# Patient Record
Sex: MALE | Race: WHITE | NOT HISPANIC OR LATINO | Employment: OTHER | URBAN - NONMETROPOLITAN AREA
[De-identification: names, ages, dates, MRNs, and addresses within clinical notes are randomized per-mention and may not be internally consistent; named-entity substitution may affect disease eponyms.]

---

## 2017-04-10 ENCOUNTER — TRANSCRIBE ORDERS (OUTPATIENT)
Dept: ADMINISTRATIVE | Facility: HOSPITAL | Age: 72
End: 2017-04-10

## 2017-04-10 DIAGNOSIS — I25.10 UNSPECIFIED CARDIOVASCULAR DISEASE: Primary | ICD-10-CM

## 2017-04-21 ENCOUNTER — HOSPITAL ENCOUNTER (OUTPATIENT)
Dept: CARDIOLOGY | Facility: HOSPITAL | Age: 72
Discharge: HOME | End: 2017-04-21
Attending: INTERNAL MEDICINE

## 2017-04-21 ENCOUNTER — APPOINTMENT (OUTPATIENT)
Dept: CARDIOLOGY | Facility: HOSPITAL | Age: 72
End: 2017-04-21
Attending: INTERNAL MEDICINE

## 2017-04-26 ENCOUNTER — TELEPHONE (OUTPATIENT)
Dept: CARDIOLOGY | Age: 72
End: 2017-04-26

## 2017-04-26 ENCOUNTER — OFFICE VISIT (OUTPATIENT)
Dept: CARDIOLOGY | Age: 72
End: 2017-04-26
Payer: MEDICARE

## 2017-04-26 VITALS
HEART RATE: 72 BPM | HEIGHT: 70 IN | BODY MASS INDEX: 37.94 KG/M2 | WEIGHT: 265 LBS | SYSTOLIC BLOOD PRESSURE: 116 MMHG | DIASTOLIC BLOOD PRESSURE: 78 MMHG

## 2017-04-26 DIAGNOSIS — I25.10 CORONARY ARTERY DISEASE INVOLVING NATIVE CORONARY ARTERY OF NATIVE HEART WITHOUT ANGINA PECTORIS: Primary | ICD-10-CM

## 2017-04-26 DIAGNOSIS — R06.09 DOE (DYSPNEA ON EXERTION): ICD-10-CM

## 2017-04-26 DIAGNOSIS — I10 ESSENTIAL HYPERTENSION: ICD-10-CM

## 2017-04-26 PROCEDURE — 1123F ACP DISCUSS/DSCN MKR DOCD: CPT | Performed by: CLINICAL NURSE SPECIALIST

## 2017-04-26 PROCEDURE — 3017F COLORECTAL CA SCREEN DOC REV: CPT | Performed by: CLINICAL NURSE SPECIALIST

## 2017-04-26 PROCEDURE — 1036F TOBACCO NON-USER: CPT | Performed by: CLINICAL NURSE SPECIALIST

## 2017-04-26 PROCEDURE — G8417 CALC BMI ABV UP PARAM F/U: HCPCS | Performed by: CLINICAL NURSE SPECIALIST

## 2017-04-26 PROCEDURE — G8427 DOCREV CUR MEDS BY ELIG CLIN: HCPCS | Performed by: CLINICAL NURSE SPECIALIST

## 2017-04-26 PROCEDURE — G8598 ASA/ANTIPLAT THER USED: HCPCS | Performed by: CLINICAL NURSE SPECIALIST

## 2017-04-26 PROCEDURE — 99214 OFFICE O/P EST MOD 30 MIN: CPT | Performed by: CLINICAL NURSE SPECIALIST

## 2017-04-26 PROCEDURE — 4040F PNEUMOC VAC/ADMIN/RCVD: CPT | Performed by: CLINICAL NURSE SPECIALIST

## 2017-04-26 RX ORDER — RANOLAZINE 500 MG/1
1000 TABLET, EXTENDED RELEASE ORAL 2 TIMES DAILY
COMMUNITY
End: 2022-04-28

## 2017-05-04 ENCOUNTER — HOSPITAL ENCOUNTER (OUTPATIENT)
Dept: NUCLEAR MEDICINE | Age: 72
Discharge: HOME | End: 2017-05-04
Payer: MEDICARE

## 2017-05-04 ENCOUNTER — HOSPITAL ENCOUNTER (OUTPATIENT)
Dept: NON INVASIVE DIAGNOSTICS | Age: 72
Discharge: HOME | End: 2017-05-04
Payer: MEDICARE

## 2017-05-04 ENCOUNTER — HOSPITAL ENCOUNTER (OUTPATIENT)
Dept: NUCLEAR MEDICINE | Age: 72
End: 2017-05-04
Payer: MEDICARE

## 2017-05-04 DIAGNOSIS — I10 ESSENTIAL HYPERTENSION: ICD-10-CM

## 2017-05-04 DIAGNOSIS — I25.10 CORONARY ARTERY DISEASE INVOLVING NATIVE CORONARY ARTERY OF NATIVE HEART WITHOUT ANGINA PECTORIS: ICD-10-CM

## 2017-05-04 DIAGNOSIS — R06.09 DOE (DYSPNEA ON EXERTION): ICD-10-CM

## 2017-05-04 PROCEDURE — 78452 HT MUSCLE IMAGE SPECT MULT: CPT

## 2017-05-04 PROCEDURE — 6360000002 HC RX W HCPCS: Performed by: INTERNAL MEDICINE

## 2017-05-04 PROCEDURE — A9500 TC99M SESTAMIBI: HCPCS | Performed by: INTERNAL MEDICINE

## 2017-05-04 PROCEDURE — 93017 CV STRESS TEST TRACING ONLY: CPT

## 2017-05-04 PROCEDURE — 3430000000 HC RX DIAGNOSTIC RADIOPHARMACEUTICAL: Performed by: INTERNAL MEDICINE

## 2017-05-04 RX ADMIN — TETRAKIS(2-METHOXYISOBUTYLISOCYANIDE)COPPER(I) TETRAFLUOROBORATE 30 MILLICURIE: 1 INJECTION, POWDER, LYOPHILIZED, FOR SOLUTION INTRAVENOUS at 10:48

## 2017-05-04 RX ADMIN — TETRAKIS(2-METHOXYISOBUTYLISOCYANIDE)COPPER(I) TETRAFLUOROBORATE 10 MILLICURIE: 1 INJECTION, POWDER, LYOPHILIZED, FOR SOLUTION INTRAVENOUS at 10:48

## 2017-05-04 RX ADMIN — REGADENOSON 0.4 MG: 0.08 INJECTION, SOLUTION INTRAVENOUS at 10:48

## 2017-09-05 ENCOUNTER — HOSPITAL ENCOUNTER (OUTPATIENT)
Dept: LAB | Age: 72
Discharge: HOME | End: 2017-09-05
Payer: MEDICARE

## 2017-09-05 ENCOUNTER — HOSPITAL ENCOUNTER (OUTPATIENT)
Dept: NON INVASIVE DIAGNOSTICS | Age: 72
Discharge: HOME | End: 2017-09-05
Payer: MEDICARE

## 2017-09-05 ENCOUNTER — HOSPITAL ENCOUNTER (OUTPATIENT)
Dept: PREADMISSION TESTING | Age: 72
Setting detail: OUTPATIENT SURGERY
Discharge: HOME | End: 2017-09-05

## 2017-09-05 LAB
ANION GAP SERPL CALCULATED.3IONS-SCNC: 19 MMOL/L (ref 7–19)
BUN BLDV-MCNC: 23 MG/DL (ref 8–23)
CALCIUM SERPL-MCNC: 9.2 MG/DL (ref 8.8–10.2)
CHLORIDE BLD-SCNC: 103 MMOL/L (ref 98–111)
CO2: 22 MMOL/L (ref 22–29)
CREAT SERPL-MCNC: 1.5 MG/DL (ref 0.5–1.2)
GFR NON-AFRICAN AMERICAN: 46
GLUCOSE BLD-MCNC: 97 MG/DL (ref 74–109)
POTASSIUM SERPL-SCNC: 4.7 MMOL/L (ref 3.5–5)
SODIUM BLD-SCNC: 144 MMOL/L (ref 136–145)

## 2017-09-05 PROCEDURE — 93005 ELECTROCARDIOGRAM TRACING: CPT

## 2017-09-06 ENCOUNTER — ANESTHESIA EVENT (OUTPATIENT)
Dept: OPERATING ROOM | Age: 72
End: 2017-09-06

## 2017-09-14 ENCOUNTER — ANESTHESIA (OUTPATIENT)
Dept: OPERATING ROOM | Age: 72
End: 2017-09-14
Payer: MEDICARE

## 2017-09-14 ENCOUNTER — HOSPITAL ENCOUNTER (OUTPATIENT)
Age: 72
Setting detail: OUTPATIENT SURGERY
Discharge: HOME | End: 2017-09-14
Attending: ORTHOPAEDIC SURGERY | Admitting: ORTHOPAEDIC SURGERY

## 2017-09-14 ENCOUNTER — HOSPITAL ENCOUNTER (OUTPATIENT)
Dept: GENERAL RADIOLOGY | Age: 72
Discharge: HOME | End: 2017-09-14
Payer: MEDICARE

## 2017-09-14 VITALS
DIASTOLIC BLOOD PRESSURE: 64 MMHG | OXYGEN SATURATION: 92 % | RESPIRATION RATE: 18 BRPM | TEMPERATURE: 97 F | BODY MASS INDEX: 35.65 KG/M2 | HEART RATE: 76 BPM | SYSTOLIC BLOOD PRESSURE: 99 MMHG | WEIGHT: 249 LBS | HEIGHT: 70 IN

## 2017-09-14 VITALS
DIASTOLIC BLOOD PRESSURE: 54 MMHG | OXYGEN SATURATION: 95 % | RESPIRATION RATE: 14 BRPM | SYSTOLIC BLOOD PRESSURE: 78 MMHG

## 2017-09-14 DIAGNOSIS — M79.671 PAIN IN RIGHT FOOT: ICD-10-CM

## 2017-09-14 PROBLEM — S92.353B: Status: ACTIVE | Noted: 2017-09-14

## 2017-09-14 PROCEDURE — C1713 ANCHOR/SCREW BN/BN,TIS/BN: HCPCS | Performed by: ORTHOPAEDIC SURGERY

## 2017-09-14 PROCEDURE — G8916 PT W IV AB GIVEN ON TIME: HCPCS

## 2017-09-14 PROCEDURE — G8907 PT DOC NO EVENTS ON DISCHARG: HCPCS

## 2017-09-14 PROCEDURE — 20680 REMOVAL OF IMPLANT DEEP: CPT

## 2017-09-14 PROCEDURE — 01480 ANES OPEN PX LOWER L/A/F NOS: CPT | Performed by: NURSE ANESTHETIST, CERTIFIED REGISTERED

## 2017-09-14 PROCEDURE — 3209999900 FLUORO FOR SURGICAL PROCEDURES

## 2017-09-14 PROCEDURE — 28322 REPAIR OF METATARSALS: CPT

## 2017-09-14 DEVICE — SCREW BNE L10MM DIA2.4MM CORT S STL ST T8 STARDRV RECESS: Type: IMPLANTABLE DEVICE | Site: FOOT | Status: FUNCTIONAL

## 2017-09-14 DEVICE — SCREW BNE L18MM DIA2.4MM DST RAD VOLAR S STL ST VAR ANG LOK: Type: IMPLANTABLE DEVICE | Site: FOOT | Status: FUNCTIONAL

## 2017-09-14 DEVICE — SCREW BNE L12MM DIA2.4MM DST RAD VOLAR S STL ST VAR ANG LOK: Type: IMPLANTABLE DEVICE | Site: FOOT | Status: FUNCTIONAL

## 2017-09-14 DEVICE — SCREW BNE L14MM DIA2.4MM DST RAD VOLAR S STL ST VAR ANG LOK: Type: IMPLANTABLE DEVICE | Site: FOOT | Status: FUNCTIONAL

## 2017-09-14 DEVICE — IMPLANTABLE DEVICE: Type: IMPLANTABLE DEVICE | Site: FOOT | Status: FUNCTIONAL

## 2017-09-14 RX ORDER — LABETALOL HYDROCHLORIDE 5 MG/ML
5 INJECTION, SOLUTION INTRAVENOUS EVERY 10 MIN PRN
Status: DISCONTINUED | OUTPATIENT
Start: 2017-09-14 | End: 2017-09-14 | Stop reason: HOSPADM

## 2017-09-14 RX ORDER — ONDANSETRON 2 MG/ML
4 INJECTION INTRAMUSCULAR; INTRAVENOUS
Status: DISCONTINUED | OUTPATIENT
Start: 2017-09-14 | End: 2017-09-14 | Stop reason: HOSPADM

## 2017-09-14 RX ORDER — EPHEDRINE SULFATE 50 MG/ML
INJECTION, SOLUTION INTRAVENOUS PRN
Status: DISCONTINUED | OUTPATIENT
Start: 2017-09-14 | End: 2017-09-14 | Stop reason: SDUPTHER

## 2017-09-14 RX ORDER — HYDRALAZINE HYDROCHLORIDE 20 MG/ML
5 INJECTION INTRAMUSCULAR; INTRAVENOUS EVERY 10 MIN PRN
Status: DISCONTINUED | OUTPATIENT
Start: 2017-09-14 | End: 2017-09-14 | Stop reason: HOSPADM

## 2017-09-14 RX ORDER — HYDROMORPHONE HCL 110MG/55ML
0.5 PATIENT CONTROLLED ANALGESIA SYRINGE INTRAVENOUS EVERY 5 MIN PRN
Status: DISCONTINUED | OUTPATIENT
Start: 2017-09-14 | End: 2017-09-14 | Stop reason: HOSPADM

## 2017-09-14 RX ORDER — HYDROMORPHONE HCL 110MG/55ML
0.25 PATIENT CONTROLLED ANALGESIA SYRINGE INTRAVENOUS EVERY 5 MIN PRN
Status: DISCONTINUED | OUTPATIENT
Start: 2017-09-14 | End: 2017-09-14 | Stop reason: HOSPADM

## 2017-09-14 RX ORDER — FENTANYL CITRATE 50 UG/ML
50 INJECTION, SOLUTION INTRAMUSCULAR; INTRAVENOUS EVERY 5 MIN PRN
Status: DISCONTINUED | OUTPATIENT
Start: 2017-09-14 | End: 2017-09-14 | Stop reason: HOSPADM

## 2017-09-14 RX ORDER — CEFAZOLIN SODIUM 1 G/3ML
INJECTION, POWDER, FOR SOLUTION INTRAMUSCULAR; INTRAVENOUS PRN
Status: DISCONTINUED | OUTPATIENT
Start: 2017-09-14 | End: 2017-09-14 | Stop reason: SDUPTHER

## 2017-09-14 RX ORDER — OXYCODONE HYDROCHLORIDE AND ACETAMINOPHEN 5; 325 MG/1; MG/1
2 TABLET ORAL PRN
Status: DISCONTINUED | OUTPATIENT
Start: 2017-09-14 | End: 2017-09-14 | Stop reason: HOSPADM

## 2017-09-14 RX ORDER — HYDROCODONE BITARTRATE AND ACETAMINOPHEN 5; 325 MG/1; MG/1
1 TABLET ORAL PRN
Status: DISCONTINUED | OUTPATIENT
Start: 2017-09-14 | End: 2017-09-14 | Stop reason: HOSPADM

## 2017-09-14 RX ORDER — MEPERIDINE HYDROCHLORIDE 25 MG/ML
12.5 INJECTION INTRAMUSCULAR; INTRAVENOUS; SUBCUTANEOUS EVERY 5 MIN PRN
Status: DISCONTINUED | OUTPATIENT
Start: 2017-09-14 | End: 2017-09-14 | Stop reason: HOSPADM

## 2017-09-14 RX ORDER — DIPHENHYDRAMINE HYDROCHLORIDE 50 MG/ML
12.5 INJECTION INTRAMUSCULAR; INTRAVENOUS
Status: DISCONTINUED | OUTPATIENT
Start: 2017-09-14 | End: 2017-09-14 | Stop reason: HOSPADM

## 2017-09-14 RX ORDER — PROMETHAZINE HYDROCHLORIDE 25 MG/ML
12.5 INJECTION, SOLUTION INTRAMUSCULAR; INTRAVENOUS
Status: DISCONTINUED | OUTPATIENT
Start: 2017-09-14 | End: 2017-09-14 | Stop reason: HOSPADM

## 2017-09-14 RX ORDER — MORPHINE SULFATE 15 MG/1
15 TABLET, FILM COATED, EXTENDED RELEASE ORAL 2 TIMES DAILY
Qty: 60 TABLET | Refills: 0 | Status: SHIPPED | OUTPATIENT
Start: 2017-09-14 | End: 2017-10-14

## 2017-09-14 RX ORDER — HYDROCODONE BITARTRATE AND ACETAMINOPHEN 5; 325 MG/1; MG/1
2 TABLET ORAL PRN
Status: DISCONTINUED | OUTPATIENT
Start: 2017-09-14 | End: 2017-09-14 | Stop reason: HOSPADM

## 2017-09-14 RX ORDER — ONDANSETRON 4 MG/1
8 TABLET, ORALLY DISINTEGRATING ORAL ONCE
Status: DISCONTINUED | OUTPATIENT
Start: 2017-09-14 | End: 2017-09-14 | Stop reason: HOSPADM

## 2017-09-14 RX ORDER — OXYCODONE HYDROCHLORIDE AND ACETAMINOPHEN 5; 325 MG/1; MG/1
1 TABLET ORAL PRN
Status: DISCONTINUED | OUTPATIENT
Start: 2017-09-14 | End: 2017-09-14 | Stop reason: HOSPADM

## 2017-09-14 RX ORDER — HYDROCODONE BITARTRATE AND ACETAMINOPHEN 10; 325 MG/1; MG/1
1 TABLET ORAL EVERY 4 HOURS PRN
Qty: 90 TABLET | Refills: 0 | Status: SHIPPED | OUTPATIENT
Start: 2017-09-14 | End: 2017-09-21

## 2017-09-14 RX ORDER — SODIUM CHLORIDE, SODIUM LACTATE, POTASSIUM CHLORIDE, CALCIUM CHLORIDE 600; 310; 30; 20 MG/100ML; MG/100ML; MG/100ML; MG/100ML
INJECTION, SOLUTION INTRAVENOUS CONTINUOUS
Status: DISCONTINUED | OUTPATIENT
Start: 2017-09-14 | End: 2017-09-14 | Stop reason: HOSPADM

## 2017-09-14 RX ORDER — ONDANSETRON 2 MG/ML
INJECTION INTRAMUSCULAR; INTRAVENOUS PRN
Status: DISCONTINUED | OUTPATIENT
Start: 2017-09-14 | End: 2017-09-14 | Stop reason: SDUPTHER

## 2017-09-14 RX ORDER — FENTANYL CITRATE 50 UG/ML
INJECTION, SOLUTION INTRAMUSCULAR; INTRAVENOUS PRN
Status: DISCONTINUED | OUTPATIENT
Start: 2017-09-14 | End: 2017-09-14 | Stop reason: SDUPTHER

## 2017-09-14 RX ORDER — PROPOFOL 10 MG/ML
INJECTION, EMULSION INTRAVENOUS PRN
Status: DISCONTINUED | OUTPATIENT
Start: 2017-09-14 | End: 2017-09-14 | Stop reason: SDUPTHER

## 2017-09-14 RX ORDER — MIDAZOLAM HYDROCHLORIDE 1 MG/ML
INJECTION INTRAMUSCULAR; INTRAVENOUS PRN
Status: DISCONTINUED | OUTPATIENT
Start: 2017-09-14 | End: 2017-09-14 | Stop reason: SDUPTHER

## 2017-09-14 RX ORDER — BUPIVACAINE HYDROCHLORIDE 2.5 MG/ML
INJECTION, SOLUTION EPIDURAL; INFILTRATION; INTRACAUDAL PRN
Status: DISCONTINUED | OUTPATIENT
Start: 2017-09-14 | End: 2017-09-14 | Stop reason: HOSPADM

## 2017-09-14 RX ADMIN — SODIUM CHLORIDE, SODIUM LACTATE, POTASSIUM CHLORIDE, CALCIUM CHLORIDE: 600; 310; 30; 20 INJECTION, SOLUTION INTRAVENOUS at 09:48

## 2017-09-14 RX ADMIN — EPHEDRINE SULFATE 10 MG: 50 INJECTION, SOLUTION INTRAVENOUS at 12:47

## 2017-09-14 RX ADMIN — CEFAZOLIN SODIUM 1000 MG: 1 INJECTION, POWDER, FOR SOLUTION INTRAMUSCULAR; INTRAVENOUS at 11:54

## 2017-09-14 RX ADMIN — MIDAZOLAM HYDROCHLORIDE 2 MG: 1 INJECTION INTRAMUSCULAR; INTRAVENOUS at 11:56

## 2017-09-14 RX ADMIN — ONDANSETRON 2 MG: 2 INJECTION INTRAMUSCULAR; INTRAVENOUS at 12:21

## 2017-09-14 RX ADMIN — PROPOFOL 80 MG: 10 INJECTION, EMULSION INTRAVENOUS at 11:58

## 2017-09-14 RX ADMIN — FENTANYL CITRATE 50 MCG: 50 INJECTION, SOLUTION INTRAMUSCULAR; INTRAVENOUS at 11:57

## 2017-09-14 RX ADMIN — PROPOFOL 100 MG: 10 INJECTION, EMULSION INTRAVENOUS at 11:59

## 2017-10-17 ENCOUNTER — HOSPITAL ENCOUNTER (OUTPATIENT)
Dept: WOUND CARE | Age: 72
Discharge: HOME | End: 2017-10-17
Payer: MEDICARE

## 2017-10-17 VITALS
HEIGHT: 70 IN | TEMPERATURE: 97.9 F | HEART RATE: 73 BPM | WEIGHT: 255 LBS | DIASTOLIC BLOOD PRESSURE: 84 MMHG | BODY MASS INDEX: 36.51 KG/M2 | SYSTOLIC BLOOD PRESSURE: 145 MMHG | RESPIRATION RATE: 16 BRPM

## 2017-10-17 DIAGNOSIS — T81.31XA DEHISCENCE OF OPERATIVE WOUND, INITIAL ENCOUNTER: Chronic | ICD-10-CM

## 2017-10-17 PROBLEM — T81.49XA WOUND INFECTION AFTER SURGERY: Chronic | Status: ACTIVE | Noted: 2017-10-17

## 2017-10-17 PROBLEM — E66.9 CLASS 2 OBESITY IN ADULT: Chronic | Status: ACTIVE | Noted: 2017-10-17

## 2017-10-17 PROBLEM — E66.812 CLASS 2 OBESITY IN ADULT: Chronic | Status: ACTIVE | Noted: 2017-10-17

## 2017-10-17 PROCEDURE — 87205 SMEAR GRAM STAIN: CPT

## 2017-10-17 PROCEDURE — 11042 DBRDMT SUBQ TIS 1ST 20SQCM/<: CPT | Performed by: SURGERY

## 2017-10-17 PROCEDURE — 99213 OFFICE O/P EST LOW 20 MIN: CPT

## 2017-10-17 PROCEDURE — 11042 DBRDMT SUBQ TIS 1ST 20SQCM/<: CPT

## 2017-10-17 PROCEDURE — 99203 OFFICE O/P NEW LOW 30 MIN: CPT | Performed by: SURGERY

## 2017-10-17 PROCEDURE — 87185 SC STD ENZYME DETCJ PER NZM: CPT

## 2017-10-17 PROCEDURE — 86403 PARTICLE AGGLUT ANTBDY SCRN: CPT

## 2017-10-17 PROCEDURE — 87070 CULTURE OTHR SPECIMN AEROBIC: CPT

## 2017-10-17 RX ORDER — CEPHALEXIN 500 MG/1
500 CAPSULE ORAL 4 TIMES DAILY
COMMUNITY
End: 2017-10-31 | Stop reason: ALTCHOICE

## 2017-10-17 ASSESSMENT — PAIN DESCRIPTION - ORIENTATION: ORIENTATION: RIGHT

## 2017-10-17 ASSESSMENT — PAIN DESCRIPTION - FREQUENCY: FREQUENCY: INTERMITTENT

## 2017-10-17 ASSESSMENT — PAIN SCALES - GENERAL: PAINLEVEL_OUTOF10: 3

## 2017-10-17 ASSESSMENT — PAIN DESCRIPTION - DESCRIPTORS: DESCRIPTORS: SHARP;SHOOTING

## 2017-10-17 ASSESSMENT — PAIN DESCRIPTION - PAIN TYPE: TYPE: ACUTE PAIN

## 2017-10-17 ASSESSMENT — PAIN DESCRIPTION - LOCATION: LOCATION: FOOT

## 2017-10-17 NOTE — PROGRESS NOTES
ointment Apply topically daily. 1 Tube 1    ranolazine (RANEXA) 500 MG extended release tablet Take 1,000 mg by mouth 2 times daily      amLODIPine (NORVASC) 5 MG tablet Take 2.5 mg by mouth daily       omeprazole (PRILOSEC) 20 MG capsule Take 20 mg by mouth daily.  metoprolol (LOPRESSOR) 50 MG tablet Take 25 mg by mouth 2 times daily One half tablet twice daily.  lisinopril (PRINIVIL;ZESTRIL) 10 MG tablet Take 10 mg by mouth daily.  dipyridamole (PERSANTINE) 75 MG tablet Take 75 mg by mouth 3 times daily.  Omega-3 Fatty Acids (FISH OIL) 1000 MG CAPS Take 1,000 mg by mouth 2 times daily.  aspirin 325 MG tablet Take 325 mg by mouth daily.  Coenzyme Q10 (CO Q 10) 10 MG CAPS Take  by mouth.  atorvastatin (LIPITOR) 40 MG tablet Take 20 mg by mouth daily        Current Facility-Administered Medications   Medication Dose Route Frequency Provider Last Rate Last Dose    lidocaine (XYLOCAINE) 2 % jelly   Topical Once Nadir Orantes MD         Allergies: Crestor [rosuvastatin]  Past Medical History:   Diagnosis Date    CAD (coronary artery disease) 2003    CABG    Cervical spinal stenosis     Claustrophobia     Depression     Diverticulitis     HTN (hypertension)     Hyperlipidemia     Dr. Ben Hernandez manages    MI (myocardial infarction)     MI, old 2003    Sleep apnea     wear CPAP       Past Surgical History:   Procedure Laterality Date    CARDIAC CATHETERIZATION  01/2011    Chronic occusions med rx for now   [address]   2003    In  [address]     [address] / ANKLE Right 9/14/2017    HARDWARE REMOVAL AND REVISION ORIF 5TH METATARSAL performed by Luisa Staley DO at [address] [facility] [address]       History reviewed. No pertinent family history.   Social History   Substance Use Topics    Smoking status: Former Smoker     Quit date: 12/1/2002    Smokeless initial encounter    2. Class 2 obesity due to excess calories with serious comorbidity and body mass index (BMI) of 36.0 to 36.9 in adult    3. Postoperative wound infection, initial encounter          Plan    I discussed withthe patient that with the appearance of his wound, I am concerned about infection of the wound and likely the hardware. If the hardware is infected, I do not feel this wound will heal. The patient is taking oral antibiotics now, but likely needs IV abx. I took a culture during the clinic today. We will follow this up and see what if any other antibiotics he needs. I would recommend that he go along with any surgery suggested by Dr. Nadeen Barbosa, especially if he is recommending hardware removal. The patient expressed understanding of this. An isi was performed in the office, which showed good results. I do not feel he needs an TAYLOR at this time. Recommend no smoking  Offloading instructions given                     [facility]   Progress Note and Procedure Note      Danvilledonell Tierney  AGE: 74 y.o. GENDER: male  : 1945  TODAY'S DATE:  10/17/2017    Subjective:        HISTORY of PRESENT ILLNESS HPI   Owasso Right is a 74 y.o. male who presents today for wound evaluation. Wound Type:non-healing surgical  Wound Location:right foot  Modifying factors:chronic pressure, shear force and obesity    Patient Active Problem List   Diagnosis Code    CAD (coronary artery disease) I25.10    Sleep apnea G47.30    Hyperlipidemia E78.5    HTN (hypertension) I10    Open displaced fracture of fifth metatarsal bone S92.353B    Surgical wound dehiscence T81.31XA    Class 2 obesity in adult E66.9    Wound infection after surgery T81. 4XXA       ALLERGIES    Allergies   Allergen Reactions    Crestor [Rosuvastatin]      Muscle spasms           Objective:      BP (!) 145/84   Pulse 73   Temp 97.9 °F (36.6 °C) (Oral)   Resp 16   Ht 5' 10\" (1.778 m)   Wt 255 lb (115.7 kg)   BMI 36.59 kg/m² Post Debridement Measurements and Assessment:  Wound 10/17/17 Other (Comment) Foot Right; Outer #1 right lateral foot (Active)   Wound Type Wound 10/17/2017 11:01 AM   Wound Other 10/17/2017 11:01 AM   Dressing Status Old drainage 10/17/2017 11:01 AM   Dressing/Treatment Wound gel;ABD 10/17/2017 12:19 PM   Wound Cleansed Rinsed/Irrigated with saline 10/17/2017 11:01 AM   Wound Length (cm) 10 cm 10/17/2017 11:41 AM   Wound Width (cm) 1.5 cm 10/17/2017 11:41 AM   Wound Depth (cm)  0.2 10/17/2017 11:41 AM   Calculated Wound Size (cm^2) (l*w) 15 cm^2 10/17/2017 11:41 AM   Change in Wound Size % (l*w) 0 10/17/2017 11:41 AM   Distance Tunneling (cm) 0 cm 10/17/2017 11:01 AM   Tunneling Position ___ O'Clock 0 10/17/2017 11:01 AM   Undermining Starts ___ O'Clock 0 10/17/2017 11:01 AM   Undermining Ends___ O'Clock 0 10/17/2017 11:01 AM   Undermining Maxium Distance (cm) 0 10/17/2017 11:01 AM   Wound Assessment Granulation tissue;Pink;Slough; Yellow 10/17/2017 11:01 AM   Margins Defined edges 10/17/2017 11:01 AM   Beatriz-wound Assessment Pink;Red 10/17/2017 11:01 AM   Non-staged Wound Description Full thickness 10/17/2017 11:01 AM   [place]%Wound Bed 10 10/17/2017 11:01 AM   Yellow%Wound Bed 90 10/17/2017 11:01 AM   Drainage Amount Moderate 10/17/2017 11:01 AM   Drainage Description Serosanguinous 10/17/2017 11:01 AM   Odor None 10/17/2017 11:01 AM   Culture Taken Yes 10/17/2017 11:41 AM   Debridement per physician Partial thickness 10/17/2017 11:41 AM   Time out Yes 10/17/2017 11:41 AM   Procedural Pain 0 10/17/2017 11:41 AM   Post procedural Pain 0 10/17/2017 11:41 AM   Number of days: 0      The patients pain isPain Level: 3 Pain Type: Acute pain. Please refer to nursing measurements and assessment regarding wound pre and post debridement.     Procedure Note:    Wound #: 1     Debridement: Non-excisional Debridement    Anesthetic: Anesthetic: 2% Xylocaine Gel  Using curette, scissors and forceps the wound was sharply debrided    down through and including the removal of epidermis, dermis and subcutaneous tissue. Devitalized Tissue Debrided:  fibrin, biofilm, slough, necrotic/eschar and exudate    Percent of Wound Debrided: 100%      Bleeding: Minimal    Hemostasis:   not needed      Response to treatment:  Well tolerated by patient., With complaints of pain. Assessment:      Patient Active Problem List   Diagnosis    CAD (coronary artery disease)    Sleep apnea    Hyperlipidemia    HTN (hypertension)    Open displaced fracture of fifth metatarsal bone    Surgical wound dehiscence    Class 2 obesity in adult    Wound infection after surgery          Plan:          Plan for wound - Dress per physician order  Treatment:     Compression : No   Offloading : Yes-- discussed elevating his foot above the level of his heart. Dressing : see AVS   Additional Therapy : none     1. Discussed appropriate home care of this wound. Wound redressed. 2. Patient instructions were given. 3. Follow up: 2 week(s). Discharge Instructions       Visit Discharge/Physician Orders    Discharge condition: Stable    Discharge to: Home    Left via:Private automobile    Accompanied by:  spouse    ECF/HHA: none    Dressing Orders: Right Foot:   Wash with soap and water. Apply santyl (nickel-thickness) to wound bed. Cover with normal saline moistened gauze. Cover with dry gauze or ABD. Secure with rolled gauze and medipore tape. Change twice daily . Treatment Orders: Protein rich diet (unless restricted by your physician); Multivitamin daily; Elevate legs when sitting, avoid standing for long periods of time. Elevate legs above the level of your heart. Minimal walking. [address] [place] [address],[address] followup visit _____2 weeks________________________  (Please note your next appointment above and if you are unable to keep, kindly give a 24 hour notice.  Thank you.)          If you experience any of the following, please call the Wound Care

## 2017-10-20 LAB
GRAM STAIN RESULT: ABNORMAL
ORGANISM: ABNORMAL
WOUND/ABSCESS: ABNORMAL

## 2017-10-31 ENCOUNTER — HOSPITAL ENCOUNTER (OUTPATIENT)
Dept: WOUND CARE | Age: 72
Discharge: HOME | End: 2017-10-31
Payer: MEDICARE

## 2017-10-31 ENCOUNTER — PREP FOR PROCEDURE (OUTPATIENT)
Dept: ORTHOPEDIC SURGERY | Age: 72
End: 2017-10-31

## 2017-10-31 ENCOUNTER — HOSPITAL ENCOUNTER (OUTPATIENT)
Dept: PREADMISSION TESTING | Age: 72
Discharge: HOME | End: 2017-10-31
Payer: MEDICARE

## 2017-10-31 VITALS
DIASTOLIC BLOOD PRESSURE: 84 MMHG | RESPIRATION RATE: 18 BRPM | HEART RATE: 79 BPM | TEMPERATURE: 97.9 F | WEIGHT: 256 LBS | BODY MASS INDEX: 36.65 KG/M2 | HEIGHT: 70 IN | SYSTOLIC BLOOD PRESSURE: 128 MMHG

## 2017-10-31 VITALS — HEIGHT: 70 IN | BODY MASS INDEX: 36.65 KG/M2 | WEIGHT: 256 LBS

## 2017-10-31 DIAGNOSIS — Z01.818 PRE-OP EVALUATION: Primary | ICD-10-CM

## 2017-10-31 LAB
BASOPHILS ABSOLUTE: 0 K/UL (ref 0–0.2)
BASOPHILS RELATIVE PERCENT: 0.3 % (ref 0–1)
EKG P AXIS: 26 DEGREES
EKG P-R INTERVAL: 188 MS
EKG Q-T INTERVAL: 386 MS
EKG QRS DURATION: 106 MS
EKG QTC CALCULATION (BAZETT): 403 MS
EKG T AXIS: -124 DEGREES
EOSINOPHILS ABSOLUTE: 0.1 K/UL (ref 0–0.6)
EOSINOPHILS RELATIVE PERCENT: 2.3 % (ref 0–5)
HCT VFR BLD CALC: 47.6 % (ref 42–52)
HEMOGLOBIN: 15.5 G/DL (ref 14–18)
LYMPHOCYTES ABSOLUTE: 1.5 K/UL (ref 1.1–4.5)
LYMPHOCYTES RELATIVE PERCENT: 24.3 % (ref 20–40)
MCH RBC QN AUTO: 29.9 PG (ref 27–31)
MCHC RBC AUTO-ENTMCNC: 32.6 G/DL (ref 33–37)
MCV RBC AUTO: 91.9 FL (ref 80–94)
MONOCYTES ABSOLUTE: 0.4 K/UL (ref 0–0.9)
MONOCYTES RELATIVE PERCENT: 6.7 % (ref 0–10)
NEUTROPHILS ABSOLUTE: 3.9 K/UL (ref 1.5–7.5)
NEUTROPHILS RELATIVE PERCENT: 66.1 % (ref 50–65)
PDW BLD-RTO: 13.9 % (ref 11.5–14.5)
PLATELET # BLD: 131 K/UL (ref 130–400)
PMV BLD AUTO: 9.6 FL (ref 9.4–12.4)
RBC # BLD: 5.18 M/UL (ref 4.7–6.1)
WBC # BLD: 6 K/UL (ref 4.8–10.8)

## 2017-10-31 PROCEDURE — 97597 DBRDMT OPN WND 1ST 20 CM/<: CPT

## 2017-10-31 PROCEDURE — 93005 ELECTROCARDIOGRAM TRACING: CPT

## 2017-10-31 PROCEDURE — 85025 COMPLETE CBC W/AUTO DIFF WBC: CPT

## 2017-10-31 PROCEDURE — 97597 DBRDMT OPN WND 1ST 20 CM/<: CPT | Performed by: SURGERY

## 2017-10-31 RX ORDER — SODIUM CHLORIDE 0.9 % (FLUSH) 0.9 %
10 SYRINGE (ML) INJECTION PRN
Status: CANCELLED | OUTPATIENT
Start: 2017-10-31

## 2017-10-31 RX ORDER — LEVOFLOXACIN 500 MG/1
500 TABLET, FILM COATED ORAL DAILY
Qty: 10 TABLET | Refills: 0 | Status: ON HOLD | OUTPATIENT
Start: 2017-10-31 | End: 2017-11-03

## 2017-10-31 RX ORDER — CLINDAMYCIN HYDROCHLORIDE 150 MG/1
150 CAPSULE ORAL 4 TIMES DAILY
Qty: 40 CAPSULE | Refills: 0 | Status: ON HOLD | OUTPATIENT
Start: 2017-10-31 | End: 2017-11-03

## 2017-10-31 RX ORDER — SODIUM CHLORIDE, SODIUM LACTATE, POTASSIUM CHLORIDE, CALCIUM CHLORIDE 600; 310; 30; 20 MG/100ML; MG/100ML; MG/100ML; MG/100ML
INJECTION, SOLUTION INTRAVENOUS CONTINUOUS
Status: CANCELLED | OUTPATIENT
Start: 2017-10-31

## 2017-10-31 RX ORDER — SODIUM CHLORIDE 0.9 % (FLUSH) 0.9 %
10 SYRINGE (ML) INJECTION EVERY 12 HOURS SCHEDULED
Status: CANCELLED | OUTPATIENT
Start: 2017-10-31

## 2017-10-31 ASSESSMENT — PAIN DESCRIPTION - ORIENTATION: ORIENTATION: RIGHT

## 2017-10-31 ASSESSMENT — PAIN DESCRIPTION - PAIN TYPE: TYPE: ACUTE PAIN

## 2017-10-31 ASSESSMENT — PAIN DESCRIPTION - FREQUENCY: FREQUENCY: INTERMITTENT

## 2017-10-31 ASSESSMENT — PAIN SCALES - GENERAL: PAINLEVEL_OUTOF10: 4

## 2017-10-31 ASSESSMENT — PAIN DESCRIPTION - DESCRIPTORS: DESCRIPTORS: SHARP

## 2017-10-31 ASSESSMENT — PAIN DESCRIPTION - LOCATION: LOCATION: FOOT

## 2017-11-03 ENCOUNTER — HOSPITAL ENCOUNTER (OUTPATIENT)
Age: 72
Setting detail: OUTPATIENT SURGERY
Discharge: HOME | End: 2017-11-03
Attending: ORTHOPAEDIC SURGERY | Admitting: ORTHOPAEDIC SURGERY
Payer: MEDICARE

## 2017-11-03 ENCOUNTER — ANESTHESIA EVENT (OUTPATIENT)
Dept: OPERATING ROOM | Age: 72
End: 2017-11-03
Payer: MEDICARE

## 2017-11-03 ENCOUNTER — ANESTHESIA (OUTPATIENT)
Dept: OPERATING ROOM | Age: 72
End: 2017-11-03
Payer: MEDICARE

## 2017-11-03 VITALS
DIASTOLIC BLOOD PRESSURE: 71 MMHG | OXYGEN SATURATION: 99 % | RESPIRATION RATE: 7 BRPM | SYSTOLIC BLOOD PRESSURE: 131 MMHG

## 2017-11-03 VITALS
HEART RATE: 95 BPM | TEMPERATURE: 97.3 F | DIASTOLIC BLOOD PRESSURE: 92 MMHG | OXYGEN SATURATION: 96 % | RESPIRATION RATE: 16 BRPM | BODY MASS INDEX: 36.65 KG/M2 | SYSTOLIC BLOOD PRESSURE: 167 MMHG | WEIGHT: 256 LBS | HEIGHT: 70 IN

## 2017-11-03 PROCEDURE — 87070 CULTURE OTHR SPECIMN AEROBIC: CPT

## 2017-11-03 PROCEDURE — 6360000002 HC RX W HCPCS: Performed by: NURSE ANESTHETIST, CERTIFIED REGISTERED

## 2017-11-03 PROCEDURE — 2580000003 HC RX 258: Performed by: ORTHOPAEDIC SURGERY

## 2017-11-03 PROCEDURE — 6370000000 HC RX 637 (ALT 250 FOR IP): Performed by: ORTHOPAEDIC SURGERY

## 2017-11-03 PROCEDURE — 7100000011 HC PHASE II RECOVERY - ADDTL 15 MIN: Performed by: ORTHOPAEDIC SURGERY

## 2017-11-03 PROCEDURE — 3700000001 HC ADD 15 MINUTES (ANESTHESIA): Performed by: ORTHOPAEDIC SURGERY

## 2017-11-03 PROCEDURE — 3600000014 HC SURGERY LEVEL 4 ADDTL 15MIN: Performed by: ORTHOPAEDIC SURGERY

## 2017-11-03 PROCEDURE — 6360000002 HC RX W HCPCS: Performed by: ORTHOPAEDIC SURGERY

## 2017-11-03 PROCEDURE — 2720000001 HC MISC SURG SUPPLY STERILE $51-500: Performed by: ORTHOPAEDIC SURGERY

## 2017-11-03 PROCEDURE — 7100000010 HC PHASE II RECOVERY - FIRST 15 MIN: Performed by: ORTHOPAEDIC SURGERY

## 2017-11-03 PROCEDURE — 87186 SC STD MICRODIL/AGAR DIL: CPT

## 2017-11-03 PROCEDURE — 86403 PARTICLE AGGLUT ANTBDY SCRN: CPT

## 2017-11-03 PROCEDURE — 3700000000 HC ANESTHESIA ATTENDED CARE: Performed by: ORTHOPAEDIC SURGERY

## 2017-11-03 PROCEDURE — 87102 FUNGUS ISOLATION CULTURE: CPT

## 2017-11-03 PROCEDURE — 7100000000 HC PACU RECOVERY - FIRST 15 MIN: Performed by: ORTHOPAEDIC SURGERY

## 2017-11-03 PROCEDURE — 87205 SMEAR GRAM STAIN: CPT

## 2017-11-03 PROCEDURE — 2500000003 HC RX 250 WO HCPCS: Performed by: NURSE ANESTHETIST, CERTIFIED REGISTERED

## 2017-11-03 PROCEDURE — A6445 CONFORM BAND S W <3"/YD: HCPCS | Performed by: ORTHOPAEDIC SURGERY

## 2017-11-03 PROCEDURE — 3600000004 HC SURGERY LEVEL 4 BASE: Performed by: ORTHOPAEDIC SURGERY

## 2017-11-03 PROCEDURE — 7100000001 HC PACU RECOVERY - ADDTL 15 MIN: Performed by: ORTHOPAEDIC SURGERY

## 2017-11-03 RX ORDER — DIPHENHYDRAMINE HYDROCHLORIDE 50 MG/ML
12.5 INJECTION INTRAMUSCULAR; INTRAVENOUS
Status: DISCONTINUED | OUTPATIENT
Start: 2017-11-03 | End: 2017-11-03 | Stop reason: HOSPADM

## 2017-11-03 RX ORDER — FENTANYL CITRATE 50 UG/ML
25 INJECTION, SOLUTION INTRAMUSCULAR; INTRAVENOUS
Status: DISCONTINUED | OUTPATIENT
Start: 2017-11-03 | End: 2017-11-03 | Stop reason: HOSPADM

## 2017-11-03 RX ORDER — LIDOCAINE HYDROCHLORIDE 10 MG/ML
INJECTION, SOLUTION EPIDURAL; INFILTRATION; INTRACAUDAL; PERINEURAL PRN
Status: DISCONTINUED | OUTPATIENT
Start: 2017-11-03 | End: 2017-11-03 | Stop reason: SDUPTHER

## 2017-11-03 RX ORDER — DEXAMETHASONE SODIUM PHOSPHATE 10 MG/ML
INJECTION INTRAMUSCULAR; INTRAVENOUS PRN
Status: DISCONTINUED | OUTPATIENT
Start: 2017-11-03 | End: 2017-11-03 | Stop reason: SDUPTHER

## 2017-11-03 RX ORDER — SODIUM CHLORIDE 0.9 % (FLUSH) 0.9 %
10 SYRINGE (ML) INJECTION EVERY 12 HOURS SCHEDULED
Status: DISCONTINUED | OUTPATIENT
Start: 2017-11-03 | End: 2017-11-03 | Stop reason: HOSPADM

## 2017-11-03 RX ORDER — ENALAPRILAT 2.5 MG/2ML
1.25 INJECTION INTRAVENOUS
Status: DISCONTINUED | OUTPATIENT
Start: 2017-11-03 | End: 2017-11-03 | Stop reason: HOSPADM

## 2017-11-03 RX ORDER — SODIUM CHLORIDE, SODIUM LACTATE, POTASSIUM CHLORIDE, CALCIUM CHLORIDE 600; 310; 30; 20 MG/100ML; MG/100ML; MG/100ML; MG/100ML
INJECTION, SOLUTION INTRAVENOUS CONTINUOUS
Status: DISCONTINUED | OUTPATIENT
Start: 2017-11-03 | End: 2017-11-03 | Stop reason: HOSPADM

## 2017-11-03 RX ORDER — ONDANSETRON 2 MG/ML
INJECTION INTRAMUSCULAR; INTRAVENOUS PRN
Status: DISCONTINUED | OUTPATIENT
Start: 2017-11-03 | End: 2017-11-03 | Stop reason: SDUPTHER

## 2017-11-03 RX ORDER — LABETALOL HYDROCHLORIDE 5 MG/ML
5 INJECTION, SOLUTION INTRAVENOUS EVERY 10 MIN PRN
Status: DISCONTINUED | OUTPATIENT
Start: 2017-11-03 | End: 2017-11-03 | Stop reason: HOSPADM

## 2017-11-03 RX ORDER — SODIUM CHLORIDE 9 MG/ML
INJECTION, SOLUTION INTRAVENOUS CONTINUOUS
Status: DISCONTINUED | OUTPATIENT
Start: 2017-11-03 | End: 2017-11-03 | Stop reason: HOSPADM

## 2017-11-03 RX ORDER — MORPHINE SULFATE 10 MG/ML
2 INJECTION, SOLUTION INTRAMUSCULAR; INTRAVENOUS EVERY 5 MIN PRN
Status: DISCONTINUED | OUTPATIENT
Start: 2017-11-03 | End: 2017-11-03 | Stop reason: HOSPADM

## 2017-11-03 RX ORDER — LIDOCAINE HYDROCHLORIDE 10 MG/ML
1 INJECTION, SOLUTION EPIDURAL; INFILTRATION; INTRACAUDAL; PERINEURAL
Status: DISCONTINUED | OUTPATIENT
Start: 2017-11-03 | End: 2017-11-03 | Stop reason: HOSPADM

## 2017-11-03 RX ORDER — PROMETHAZINE HYDROCHLORIDE 25 MG/ML
6.25 INJECTION, SOLUTION INTRAMUSCULAR; INTRAVENOUS
Status: DISCONTINUED | OUTPATIENT
Start: 2017-11-03 | End: 2017-11-03 | Stop reason: HOSPADM

## 2017-11-03 RX ORDER — FENTANYL CITRATE 50 UG/ML
INJECTION, SOLUTION INTRAMUSCULAR; INTRAVENOUS PRN
Status: DISCONTINUED | OUTPATIENT
Start: 2017-11-03 | End: 2017-11-03 | Stop reason: SDUPTHER

## 2017-11-03 RX ORDER — MIDAZOLAM HYDROCHLORIDE 1 MG/ML
INJECTION INTRAMUSCULAR; INTRAVENOUS PRN
Status: DISCONTINUED | OUTPATIENT
Start: 2017-11-03 | End: 2017-11-03 | Stop reason: SDUPTHER

## 2017-11-03 RX ORDER — HYDROCODONE BITARTRATE AND ACETAMINOPHEN 10; 325 MG/1; MG/1
1 TABLET ORAL EVERY 4 HOURS PRN
Qty: 90 TABLET | Refills: 0 | Status: SHIPPED | OUTPATIENT
Start: 2017-11-03 | End: 2017-11-10

## 2017-11-03 RX ORDER — PROPOFOL 10 MG/ML
INJECTION, EMULSION INTRAVENOUS PRN
Status: DISCONTINUED | OUTPATIENT
Start: 2017-11-03 | End: 2017-11-03 | Stop reason: SDUPTHER

## 2017-11-03 RX ORDER — EPHEDRINE SULFATE 50 MG/ML
INJECTION, SOLUTION INTRAVENOUS PRN
Status: DISCONTINUED | OUTPATIENT
Start: 2017-11-03 | End: 2017-11-03 | Stop reason: SDUPTHER

## 2017-11-03 RX ORDER — SODIUM CHLORIDE 0.9 % (FLUSH) 0.9 %
10 SYRINGE (ML) INJECTION PRN
Status: DISCONTINUED | OUTPATIENT
Start: 2017-11-03 | End: 2017-11-03 | Stop reason: HOSPADM

## 2017-11-03 RX ORDER — HYDROCODONE BITARTRATE AND ACETAMINOPHEN 5; 325 MG/1; MG/1
2 TABLET ORAL PRN
Status: COMPLETED | OUTPATIENT
Start: 2017-11-03 | End: 2017-11-03

## 2017-11-03 RX ORDER — MORPHINE SULFATE 10 MG/ML
4 INJECTION, SOLUTION INTRAMUSCULAR; INTRAVENOUS EVERY 5 MIN PRN
Status: DISCONTINUED | OUTPATIENT
Start: 2017-11-03 | End: 2017-11-03 | Stop reason: HOSPADM

## 2017-11-03 RX ORDER — MEPERIDINE HYDROCHLORIDE 50 MG/ML
12.5 INJECTION INTRAMUSCULAR; INTRAVENOUS; SUBCUTANEOUS EVERY 5 MIN PRN
Status: DISCONTINUED | OUTPATIENT
Start: 2017-11-03 | End: 2017-11-03 | Stop reason: HOSPADM

## 2017-11-03 RX ORDER — MIDAZOLAM HYDROCHLORIDE 1 MG/ML
2 INJECTION INTRAMUSCULAR; INTRAVENOUS
Status: DISCONTINUED | OUTPATIENT
Start: 2017-11-03 | End: 2017-11-03 | Stop reason: HOSPADM

## 2017-11-03 RX ORDER — FENTANYL CITRATE 50 UG/ML
50 INJECTION, SOLUTION INTRAMUSCULAR; INTRAVENOUS
Status: DISCONTINUED | OUTPATIENT
Start: 2017-11-03 | End: 2017-11-03 | Stop reason: HOSPADM

## 2017-11-03 RX ORDER — CEPHALEXIN 500 MG/1
500 CAPSULE ORAL 4 TIMES DAILY
Qty: 40 CAPSULE | Refills: 0 | Status: SHIPPED | OUTPATIENT
Start: 2017-11-03 | End: 2017-11-21 | Stop reason: ALTCHOICE

## 2017-11-03 RX ORDER — HYDROCODONE BITARTRATE AND ACETAMINOPHEN 5; 325 MG/1; MG/1
1 TABLET ORAL PRN
Status: COMPLETED | OUTPATIENT
Start: 2017-11-03 | End: 2017-11-03

## 2017-11-03 RX ORDER — METOCLOPRAMIDE HYDROCHLORIDE 5 MG/ML
10 INJECTION INTRAMUSCULAR; INTRAVENOUS
Status: DISCONTINUED | OUTPATIENT
Start: 2017-11-03 | End: 2017-11-03 | Stop reason: HOSPADM

## 2017-11-03 RX ORDER — HYDRALAZINE HYDROCHLORIDE 20 MG/ML
5 INJECTION INTRAMUSCULAR; INTRAVENOUS EVERY 10 MIN PRN
Status: DISCONTINUED | OUTPATIENT
Start: 2017-11-03 | End: 2017-11-03 | Stop reason: HOSPADM

## 2017-11-03 RX ADMIN — MIDAZOLAM HYDROCHLORIDE 2 MG: 1 INJECTION, SOLUTION INTRAMUSCULAR; INTRAVENOUS at 10:20

## 2017-11-03 RX ADMIN — MORPHINE SULFATE 2 MG: 10 INJECTION INTRAVENOUS at 12:32

## 2017-11-03 RX ADMIN — SODIUM CHLORIDE, SODIUM LACTATE, POTASSIUM CHLORIDE, AND CALCIUM CHLORIDE: 600; 310; 30; 20 INJECTION, SOLUTION INTRAVENOUS at 11:00

## 2017-11-03 RX ADMIN — MORPHINE SULFATE 4 MG: 10 INJECTION INTRAVENOUS at 12:29

## 2017-11-03 RX ADMIN — SODIUM CHLORIDE, SODIUM LACTATE, POTASSIUM CHLORIDE, AND CALCIUM CHLORIDE: 600; 310; 30; 20 INJECTION, SOLUTION INTRAVENOUS at 08:52

## 2017-11-03 RX ADMIN — FENTANYL CITRATE 50 MCG: 50 INJECTION, SOLUTION INTRAMUSCULAR; INTRAVENOUS at 10:31

## 2017-11-03 RX ADMIN — FENTANYL CITRATE 100 MCG: 50 INJECTION, SOLUTION INTRAMUSCULAR; INTRAVENOUS at 10:25

## 2017-11-03 RX ADMIN — CEFAZOLIN SODIUM 2 G: 2 SOLUTION INTRAVENOUS at 10:32

## 2017-11-03 RX ADMIN — MORPHINE SULFATE 4 MG: 10 INJECTION INTRAVENOUS at 12:14

## 2017-11-03 RX ADMIN — FENTANYL CITRATE 50 MCG: 50 INJECTION, SOLUTION INTRAMUSCULAR; INTRAVENOUS at 11:19

## 2017-11-03 RX ADMIN — ONDANSETRON HYDROCHLORIDE 4 MG: 2 SOLUTION INTRAMUSCULAR; INTRAVENOUS at 11:15

## 2017-11-03 RX ADMIN — DEXAMETHASONE SODIUM PHOSPHATE 10 MG: 10 INJECTION INTRAMUSCULAR; INTRAVENOUS at 10:40

## 2017-11-03 RX ADMIN — PROPOFOL 200 MG: 10 INJECTION, EMULSION INTRAVENOUS at 10:28

## 2017-11-03 RX ADMIN — EPHEDRINE SULFATE 15 MG: 50 INJECTION, SOLUTION INTRAMUSCULAR; INTRAVENOUS; SUBCUTANEOUS at 10:48

## 2017-11-03 RX ADMIN — FENTANYL CITRATE 50 MCG: 50 INJECTION, SOLUTION INTRAMUSCULAR; INTRAVENOUS at 11:27

## 2017-11-03 RX ADMIN — FENTANYL CITRATE 100 MCG: 50 INJECTION, SOLUTION INTRAMUSCULAR; INTRAVENOUS at 10:57

## 2017-11-03 RX ADMIN — HYDROCODONE BITARTRATE AND ACETAMINOPHEN 2 TABLET: 5; 325 TABLET ORAL at 13:12

## 2017-11-03 RX ADMIN — LIDOCAINE HYDROCHLORIDE 50 MG: 10 INJECTION, SOLUTION EPIDURAL; INFILTRATION; INTRACAUDAL; PERINEURAL at 10:28

## 2017-11-03 ASSESSMENT — PAIN SCALES - GENERAL
PAINLEVEL_OUTOF10: 8
PAINLEVEL_OUTOF10: 9
PAINLEVEL_OUTOF10: 6
PAINLEVEL_OUTOF10: 8
PAINLEVEL_OUTOF10: 9
PAINLEVEL_OUTOF10: 9

## 2017-11-03 ASSESSMENT — PAIN DESCRIPTION - PAIN TYPE
TYPE: SURGICAL PAIN

## 2017-11-03 ASSESSMENT — PAIN DESCRIPTION - DESCRIPTORS
DESCRIPTORS: SHARP
DESCRIPTORS: ACHING
DESCRIPTORS: ACHING

## 2017-11-03 ASSESSMENT — PAIN DESCRIPTION - LOCATION
LOCATION: FOOT;ANKLE
LOCATION: ANKLE
LOCATION: FOOT

## 2017-11-03 ASSESSMENT — ENCOUNTER SYMPTOMS: SHORTNESS OF BREATH: 0

## 2017-11-03 ASSESSMENT — PAIN DESCRIPTION - ORIENTATION: ORIENTATION: RIGHT

## 2017-11-03 NOTE — OP NOTE
OPERATIVE NOTE  [facility] [address] [place], [facility]      NAME OF SURGEON: Vick Brothers D.O.    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 Patient consents to the procedure above understanding the risks of bleeding, infection, anesthesia, nerve injury, stiffness, and blood clots. Procedure in Detail: After informed consent was obtained patient was taken to the operating suite and placed on the operating room table in the supine position. After successful induction of general endotracheal anesthetic tourniquet was placed around the proximal right thigh. Right lower extremity was draped prepped in normal sterile fashion. A 6 inch Esmarch bandage used to exsanguinate right lower extremity tourniquet was inflated to 300 mmHg. Incision over the lateral aspect of her right forefoot was sharply debrided with a 15 blade scalpel. Significant fibrotic tissue is identified over the lateral aspect of the right forefoot. completely covering the 2.7 mm/2.4 mm cloverleaf fusion plate and to forward 2.7 mm locking screws. Debridement was carried down to the level of the hardware. Deep aerobic and anaerobic culture swabs were obtained at the level of the hardware. Multiple 2.4 mm locking screws were removed from the 2.4 mm/2.7 mm cloverleaf fusion plate.  After all the 2.4 mm screws had been

## 2017-11-03 NOTE — H&P
[address] , [place]  History and Physical Update    Pt Name: James Daly  MRN: [id number]  YOB: 1945  Date of evaluation: 11/3/2017    H&P Dictation #     [x] I have examined the patient and reviewed the H&P/Consult and there are no changes to the patient or plans.     [] I have examined the patient and reviewed the H&P/Consult and have noted the following changes:       Jareth Salamanca  Electronically signed 11/3/2017 at 9:43 AM

## 2017-11-03 NOTE — ANESTHESIA PRE PROCEDURE
Department of Anesthesiology  Preprocedure Note       Name:  Neeta Layton   Age:  74 y.o.  :  1945                                          MRN:  [id number]         Date:  11/3/2017      Surgeon: Werner Padgett):  Santino Gavin DO    Procedure: Procedure(s):  INCISION AND DRAINAGE REMOVAL INFECTED HARDWARE RIGHT FOOT    Medications prior to admission:   Prior to Admission medications    Medication Sig Start Date End Date Taking? Authorizing Provider   clindamycin (CLEOCIN) 150 MG capsule Take 1 capsule by mouth 4 times daily for 10 days 10/31/17 11/10/17  Brittney Mckinley MD   levofloxacin [facility]) 500 MG tablet Take 1 tablet by mouth daily for 10 days 10/31/17 11/10/17  Brittney Mckinley MD   ranolazine (RANEXA) 500 MG extended release tablet Take 1,000 mg by mouth 2 times daily    Historical Provider, MD   amLODIPine (NORVASC) 5 MG tablet Take 2.5 mg by mouth daily     Historical Provider, MD   omeprazole (PRILOSEC) 20 MG capsule Take 20 mg by mouth daily. Historical Provider, MD   metoprolol (LOPRESSOR) 50 MG tablet Take 25 mg by mouth 2 times daily One half tablet twice daily. Historical Provider, MD   lisinopril (PRINIVIL;ZESTRIL) 10 MG tablet Take 10 mg by mouth daily. Historical Provider, MD   dipyridamole (PERSANTINE) 75 MG tablet Take 75 mg by mouth 3 times daily. Historical Provider, MD   Omega-3 Fatty Acids (FISH OIL) 1000 MG CAPS Take 1,000 mg by mouth 2 times daily. Historical Provider, MD   aspirin 325 MG tablet Take 325 mg by mouth daily. Historical Provider, MD   Coenzyme Q10 (CO Q 10) 10 MG CAPS Take  by mouth.     Historical Provider, MD   atorvastatin (LIPITOR) 40 MG tablet Take 20 mg by mouth daily     Historical Provider, MD       Current medications:    Current Facility-Administered Medications   Medication Dose Route Frequency Provider Last Rate Last Dose    ceFAZolin (ANCEF) 2 g in dextrose 3 % 50 mL IVPB (duplex)  2 g Intravenous On Call to Fredy 35, DO  lactated ringers infusion   Intravenous Continuous Marilia Mohamud DO        sodium chloride flush 0.9 % injection 10 mL  10 mL Intravenous 2 times per day Marilia Mohamud DO        sodium chloride flush 0.9 % injection 10 mL  10 mL Intravenous PRN Marilia Mohamud DO           Allergies: Allergies   Allergen Reactions    Crestor [Rosuvastatin]      Muscle spasms       Problem List:    Patient Active Problem List   Diagnosis Code    CAD (coronary artery disease) I25.10    Sleep apnea G47.30    Hyperlipidemia E78.5    HTN (hypertension) I10    Open displaced fracture of fifth metatarsal bone S92.353B    Surgical wound dehiscence T81.31XA    Class 2 obesity in adult E66.9    Wound infection after surgery T81. 4XXA       Past Medical History:        Diagnosis Date    CAD (coronary artery disease) 2003    CABG    Cervical spinal stenosis     Claustrophobia     Depression     Diverticulitis     HTN (hypertension)     Hyperlipidemia     Dr. Deni Mendes manages    MI (myocardial infarction)     MI, old 2003    Sleep apnea     wear CPAP       Past Surgical History:        Procedure Laterality Date    CARDIAC CATHETERIZATION  01/2011    Chronic occusions med rx for now   [address]   2003    [place]  [address]     [address] / ANKLE Right 9/14/2017    HARDWARE REMOVAL AND REVISION ORIF 5TH METATARSAL performed by Marilia Mohamud DO at [address] [facility] [address]         Social History:    Social History   Substance Use Topics    Smoking status: Former Smoker     Quit date: 12/1/2002    Smokeless tobacco: Never Used    Alcohol use No                                Counseling given: Not Answered      Vital Signs (Current):   Vitals:    11/03/17 0827   Pulse: 95   Resp: 18   Temp: 98.1 °F (36.7 °C)   TempSrc: Temporal   SpO2: 98%   Weight: 256 lb (116.1 kg)   Height: 5' 10\" (1.778 m) BP Readings from Last 3 Encounters:   10/31/17 128/84   10/17/17 (!) 145/84   09/14/17 (!) 78/54       NPO Status:                                                                                 BMI:   Wt Readings from Last 3 Encounters:   11/03/17 256 lb (116.1 kg)   10/31/17 256 lb (116.1 kg)   10/31/17 256 lb (116.1 kg)     Body mass index is 36.73 kg/m². CBC:   Lab Results   Component Value Date    WBC 6.0 10/31/2017    RBC 5.18 10/31/2017    HGB 15.5 10/31/2017    HCT 47.6 10/31/2017    MCV 91.9 10/31/2017    RDW 13.9 10/31/2017     10/31/2017       CMP:   Lab Results   Component Value Date     09/05/2017    K 4.7 09/05/2017     09/05/2017    CO2 22 09/05/2017    BUN 23 09/05/2017    CREATININE 1.5 09/05/2017    LABGLOM 46 09/05/2017    GLUCOSE 97 09/05/2017    CALCIUM 9.2 09/05/2017       POC Tests: No results for input(s): POCGLU, POCNA, POCK, POCCL, POCBUN, POCHEMO, POCHCT in the last 72 hours.     Coags: No results found for: PROTIME, INR, APTT    HCG (If Applicable): No results found for: PREGTESTUR, PREGSERUM, HCG, HCGQUANT     ABGs: No results found for: PHART, PO2ART, TVT3KMJ, EOR4CMP, BEART, I1SJTHJG     Type & Screen (If Applicable):  No results found for: [facility]    Anesthesia Evaluation  Patient summary reviewed and Nursing notes reviewed no history of anesthetic complications:   Airway: Mallampati: I  TM distance: >3 FB   Neck ROM: full  Mouth opening: > = 3 FB Dental:    (+) upper dentures and lower dentures      Pulmonary: breath sounds clear to auscultation  (+) sleep apnea: on noncompliant,      (-) shortness of breath                           Cardiovascular:  Exercise tolerance: good (>4 METS),   (+) hypertension:, past MI: no interval change, CAD: no interval change, CABG/stent (CABG x 1 in 2003): no interval change,     (-) dysrhythmias and  ALMAGUER    ECG reviewed  Rhythm: regular    Echocardiogram reviewed  Stress test

## 2017-11-07 ENCOUNTER — HOSPITAL ENCOUNTER (OUTPATIENT)
Dept: WOUND CARE | Age: 72
Discharge: HOME | End: 2017-11-07
Payer: MEDICARE

## 2017-11-07 VITALS
RESPIRATION RATE: 16 BRPM | SYSTOLIC BLOOD PRESSURE: 142 MMHG | TEMPERATURE: 98 F | HEIGHT: 70 IN | DIASTOLIC BLOOD PRESSURE: 85 MMHG | BODY MASS INDEX: 36.65 KG/M2 | HEART RATE: 82 BPM | WEIGHT: 256 LBS

## 2017-11-07 LAB
ANAEROBIC CULTURE: ABNORMAL
CULTURE SURGICAL: ABNORMAL
GRAM STAIN RESULT: ABNORMAL
ORGANISM: ABNORMAL
ORGANISM: ABNORMAL

## 2017-11-07 PROCEDURE — 99212 OFFICE O/P EST SF 10 MIN: CPT | Performed by: SURGERY

## 2017-11-07 PROCEDURE — 99213 OFFICE O/P EST LOW 20 MIN: CPT

## 2017-11-07 ASSESSMENT — PAIN DESCRIPTION - ORIENTATION: ORIENTATION: RIGHT

## 2017-11-07 ASSESSMENT — PAIN DESCRIPTION - LOCATION: LOCATION: FOOT

## 2017-11-07 ASSESSMENT — PAIN DESCRIPTION - PROGRESSION: CLINICAL_PROGRESSION: GRADUALLY IMPROVING

## 2017-11-07 ASSESSMENT — PAIN DESCRIPTION - DESCRIPTORS: DESCRIPTORS: SHOOTING

## 2017-11-07 ASSESSMENT — PAIN DESCRIPTION - FREQUENCY: FREQUENCY: INTERMITTENT

## 2017-11-07 ASSESSMENT — PAIN DESCRIPTION - ONSET: ONSET: GRADUAL

## 2017-11-07 ASSESSMENT — PAIN SCALES - GENERAL: PAINLEVEL_OUTOF10: 6

## 2017-11-07 ASSESSMENT — PAIN DESCRIPTION - PAIN TYPE: TYPE: SURGICAL PAIN

## 2017-11-07 NOTE — PROGRESS NOTES
Wound Care Center   Progress Note and Procedure Note      Gerri Gaming  AGE: 74 y.o. GENDER: male  : 1945  TODAY'S DATE:  2017    Subjective:        HISTORY of PRESENT ILLNESS HPI   Gerri Gaming is a 74 y.o. male who presents today for wound evaluation. Wound Type:non-healing surgical  Wound Location:right lateral foot  Modifying factors:edema, chronic pressure and obesity    Patient Active Problem List   Diagnosis Code    CAD (coronary artery disease) I25.10    Sleep apnea G47.30    Hyperlipidemia E78.5    HTN (hypertension) I10    Open displaced fracture of fifth metatarsal bone S92.353B    Surgical wound dehiscence T81.31XA    Class 2 obesity in adult E66.9    Wound infection after surgery T81. 4XXA       ALLERGIES    Allergies   Allergen Reactions    Crestor [Rosuvastatin]      Muscle spasms           Objective:      BP (!) 142/85   Pulse 82   Temp 98 °F (36.7 °C) (Temporal)   Resp 16   Ht 5' 10\" (1.778 m)   Wt 256 lb (116.1 kg)   BMI 36.73 kg/m²     Post Debridement Measurements and Assessment:  Wound 10/17/17 Other (Comment) Foot Right; Outer #1 right lateral foot (Dr Gerry Riggs removed hardware on 11/3/17) (Active)   Wound Type Wound 2017  1:42 PM   Wound Other 2017  1:42 PM   Dressing Status Old drainage; Intact 2017  1:42 PM   Dressing Changed Changed/New 10/31/2017 12:12 PM   Dressing/Treatment Wound gel;ABD; Medipore 10/31/2017 12:12 PM   Wound Cleansed Rinsed/Irrigated with saline 2017  1:42 PM   Wound Length (cm) 7.5 cm 2017  2:37 PM   Wound Width (cm) 2.4 cm 2017  2:37 PM   Wound Depth (cm)  0.2 2017  2:37 PM   Calculated Wound Size (cm^2) (l*w) 18 cm^2 2017  2:37 PM   Change in Wound Size % (l*w) -20 2017  2:37 PM   Distance Tunneling (cm) 0 cm 10/31/2017 10:45 AM   Tunneling Position ___ O'Clock 0 10/31/2017 10:45 AM   Undermining Starts ___ O'Clock 0 10/31/2017 10:45 AM   Undermining Ends___ O'Clock 0 10/31/2017 10:45 AM Undermining Maxium Distance (cm) 0 10/31/2017 10:45 AM   Wound Assessment Pink 11/7/2017  1:42 PM   Margins Undefined edges 11/7/2017  1:42 PM   Beatriz-wound Assessment Pink;Red 11/7/2017  1:42 PM   Non-staged Wound Description Full thickness 11/7/2017  1:42 PM   [place]%Wound Bed 90 11/7/2017  1:42 PM   Red%Wound Bed 0 10/31/2017 10:45 AM   Yellow%Wound Bed 10 11/7/2017  1:42 PM   Black%Wound Bed 0 10/31/2017 10:45 AM   Purple%Wound Bed 0 10/31/2017 10:45 AM   Other%Wound Bed 0 10/31/2017 10:45 AM   Drainage Amount Moderate 11/7/2017  1:42 PM   Drainage Description Serosanguinous 11/7/2017  1:42 PM   Odor None 11/7/2017  1:42 PM   Culture Taken Yes 10/17/2017 11:41 AM   Debridement per physician Partial thickness 11/7/2017  2:37 PM   Time out Yes 11/7/2017  2:37 PM   Procedural Pain 0 11/7/2017  2:37 PM   Post procedural Pain 0 11/7/2017  2:37 PM   Number of days: 21       Incision 11/03/17 Foot Right (Active)   Drainage Amount None 11/3/2017  1:38 PM   Dressing/Treatment Ace Wrap 11/3/2017  1:38 PM   Dressing Status Dry; Intact 11/3/2017  1:38 PM   Number of days: 4      The patients pain isPain Level: 6 Pain Type: Surgical pain. Wound is has slightly improved. Assessment:      Patient Active Problem List   Diagnosis    CAD (coronary artery disease)    Sleep apnea    Hyperlipidemia    HTN (hypertension)    Open displaced fracture of fifth metatarsal bone    Surgical wound dehiscence    Class 2 obesity in adult    Wound infection after surgery          Plan:          Plan for wound - Dress per physician order  Treatment:     Compression : No   Offloading : Yes   Dressing : see AVS   Additional Therapy : none     1. Discussed appropriate home care of this wound. Wound redressed. 2. Patient instructions were given. 3. Follow up: 2 week(s). Mr. Lasalle Phalen is following up today after removal of his hardware on this past Friday. He reports that he has been doing well.  He has had his surgical dressing on until

## 2017-11-21 ENCOUNTER — HOSPITAL ENCOUNTER (OUTPATIENT)
Dept: WOUND CARE | Age: 72
Discharge: HOME | End: 2017-11-21
Payer: MEDICARE

## 2017-11-21 VITALS
HEART RATE: 76 BPM | RESPIRATION RATE: 18 BRPM | DIASTOLIC BLOOD PRESSURE: 71 MMHG | WEIGHT: 256 LBS | SYSTOLIC BLOOD PRESSURE: 118 MMHG | BODY MASS INDEX: 36.65 KG/M2 | TEMPERATURE: 97.8 F | HEIGHT: 70 IN

## 2017-11-21 PROCEDURE — 97597 DBRDMT OPN WND 1ST 20 CM/<: CPT

## 2017-11-21 PROCEDURE — 97597 DBRDMT OPN WND 1ST 20 CM/<: CPT | Performed by: SURGERY

## 2017-11-21 RX ORDER — CLINDAMYCIN HYDROCHLORIDE 300 MG/1
300 CAPSULE ORAL 3 TIMES DAILY
COMMUNITY
End: 2017-12-19 | Stop reason: ALTCHOICE

## 2017-11-21 RX ORDER — LEVOFLOXACIN 750 MG/1
750 TABLET ORAL DAILY
COMMUNITY
End: 2017-12-05 | Stop reason: ALTCHOICE

## 2017-11-21 ASSESSMENT — PAIN DESCRIPTION - DESCRIPTORS: DESCRIPTORS: OTHER (COMMENT)

## 2017-11-21 ASSESSMENT — PAIN SCALES - GENERAL: PAINLEVEL_OUTOF10: 3

## 2017-11-21 ASSESSMENT — PAIN DESCRIPTION - ONSET: ONSET: ON-GOING

## 2017-11-21 ASSESSMENT — PAIN DESCRIPTION - PAIN TYPE: TYPE: ACUTE PAIN

## 2017-11-21 ASSESSMENT — PAIN DESCRIPTION - FREQUENCY: FREQUENCY: INTERMITTENT

## 2017-11-21 ASSESSMENT — PAIN DESCRIPTION - ORIENTATION: ORIENTATION: RIGHT

## 2017-11-21 ASSESSMENT — PAIN DESCRIPTION - LOCATION: LOCATION: FOOT

## 2017-11-21 ASSESSMENT — PAIN DESCRIPTION - PROGRESSION: CLINICAL_PROGRESSION: NOT CHANGED

## 2017-11-21 NOTE — PROGRESS NOTES
Wound Care Center   Progress Note and Procedure Note      Elizabeth Harris  AGE: 74 y.o. GENDER: male  : 1945  TODAY'S DATE:  2017    Subjective:        HISTORY of PRESENT ILLNESS HPI   Elizabeth Harris is a 74 y.o. male who presents today for wound evaluation. Wound Type:non-healing surgical  Wound Location:right lateral foot  Modifying factors:edema and obesity    Patient Active Problem List   Diagnosis Code    CAD (coronary artery disease) I25.10    Sleep apnea G47.30    Hyperlipidemia E78.5    HTN (hypertension) I10    Open displaced fracture of fifth metatarsal bone S92.353B    Surgical wound dehiscence T81.31XA    Class 2 obesity in adult E66.9    Wound infection after surgery T81. 4XXA       ALLERGIES    Allergies   Allergen Reactions    Crestor [Rosuvastatin]      Muscle spasms           Objective:      /71   Pulse 76   Temp 97.8 °F (36.6 °C) (Temporal)   Resp 18   Ht 5' 10\" (1.778 m)   Wt 256 lb (116.1 kg)   BMI 36.73 kg/m²     Post Debridement Measurements and Assessment:  Wound 10/17/17 Other (Comment) Foot Right; Outer #1 right lateral foot (Dr Patricia Frederick removed hardware on 11/3/17) (Active)   Wound Image    2017  2:41 PM   Wound Type Wound 2017  2:41 PM   Wound Other 2017  2:41 PM   Dressing Status Old drainage 2017  2:41 PM   Dressing Changed Changed/New 2017  3:16 PM   Dressing/Treatment Wound gel;ABD; Medipore 10/31/2017 12:12 PM   Wound Cleansed Other (Comment) 2017  2:41 PM   Wound Length (cm) 7 cm 2017  2:59 PM   Wound Width (cm) 0.6 cm 2017  2:59 PM   Wound Depth (cm)  0.4 2017  2:59 PM   Calculated Wound Size (cm^2) (l*w) 4.2 cm^2 2017  2:59 PM   Change in Wound Size % (l*w) 72 2017  2:59 PM   Distance Tunneling (cm) 0 cm 2017  2:41 PM   Tunneling Position ___ O'Clock 0 2017  2:41 PM   Undermining Starts ___ O'Clock 0 2017  2:41 PM   Undermining Ends___ O'Clock 0 2017  2:41 PM Open displaced fracture of fifth metatarsal bone    Surgical wound dehiscence    Class 2 obesity in adult    Wound infection after surgery          Plan:          Plan for wound - Dress per physician order  Treatment:     Compression : No   Offloading : Yes   Dressing : see AVS   Additional Therapy : none     1. Discussed appropriate home care of this wound. Wound redressed. 2. Patient instructions were given. 3. Follow up: 2 week(s). Mr. Sarah Keys had his stitches removed yesterday. He has about three more days of antibiotics. He denies any new issues or concerns. No fevers. His wound is looking better, and is really just a slightly open incision. Continue with current dressings and offloading as much as possible. Discharge Instructions       Visit Discharge/Physician Orders    Discharge condition: Stable     Discharge to: Home     Left via:Private automobile     Accompanied by:  spouse     ECF/HHA: [facility]     Dressing Orders: Right Foot:   Wash with soap and water. Apply Xeroform to wound bed. Cover with ABD or dry gauze. Secure with rolled gauze and medipore tape. Change twice daily .        Treatment Orders: Protein rich diet (unless restricted by your physician); Multivitamin daily; Elevate legs when sitting above the level of the heart. No weight bearing.      Dr Pressley Rater appt 12/4/17     [facility] followup visit _______2 weeks______________________  (Please note your next appointment above and if you are unable to keep, kindly give a 24 hour notice. Thank you.)        If you experience any of the following, please call the [facility] during business hours:    * Increase in Pain  * Temperature over 101  * Increase in drainage from your wound  * Drainage with a foul odor  * Bleeding  * Increase in swelling  * Need for compression bandage changes due to slippage, breakthrough drainage.     If you need medical attention outside of the business hours of the [facility] please contact your PCP or go to

## 2017-12-05 ENCOUNTER — HOSPITAL ENCOUNTER (OUTPATIENT)
Dept: WOUND CARE | Age: 72
Discharge: HOME | End: 2017-12-05
Payer: MEDICARE

## 2017-12-05 VITALS
RESPIRATION RATE: 16 BRPM | DIASTOLIC BLOOD PRESSURE: 84 MMHG | HEIGHT: 70 IN | WEIGHT: 256 LBS | BODY MASS INDEX: 36.65 KG/M2 | TEMPERATURE: 97.4 F | HEART RATE: 71 BPM | SYSTOLIC BLOOD PRESSURE: 133 MMHG

## 2017-12-05 DIAGNOSIS — S91.301D OPEN WOUND OF RIGHT FOOT, SUBSEQUENT ENCOUNTER: Chronic | ICD-10-CM

## 2017-12-05 PROBLEM — S91.301A OPEN WOUND OF RIGHT FOOT: Chronic | Status: ACTIVE | Noted: 2017-12-05

## 2017-12-05 LAB
FUNGUS (MYCOLOGY) CULTURE: NORMAL
KOH PREP: NORMAL

## 2017-12-05 PROCEDURE — 97597 DBRDMT OPN WND 1ST 20 CM/<: CPT | Performed by: SURGERY

## 2017-12-05 PROCEDURE — 97597 DBRDMT OPN WND 1ST 20 CM/<: CPT

## 2017-12-05 ASSESSMENT — PAIN SCALES - GENERAL: PAINLEVEL_OUTOF10: 0

## 2017-12-05 NOTE — PROGRESS NOTES
[facility]   Progress Note and Procedure Note      Virginie Encinas  AGE: 74 y.o. GENDER: male  : 1945  TODAY'S DATE:  2017    Subjective:        HISTORY of PRESENT ILLNESS HPI   Virginie Encinas is a 74 y.o. male who presents today for wound evaluation. Wound Type:non-healing surgical  Wound Location:right lateral foot  Modifying factors:edema and obesity    Patient Active Problem List   Diagnosis Code    CAD (coronary artery disease) I25.10    Sleep apnea G47.30    Hyperlipidemia E78.5    HTN (hypertension) I10    Open displaced fracture of fifth metatarsal bone S92.353B    Surgical wound dehiscence T81.31XA    Class 2 obesity in adult E66.9    Wound infection after surgery T81. 4XXA       ALLERGIES    Allergies   Allergen Reactions    Crestor [Rosuvastatin]      Muscle spasms           Objective:      /84   Pulse 71   Temp 97.4 °F (36.3 °C) (Temporal)   Resp 16   Ht 5' 10\" (1.778 m)   Wt 256 lb (116.1 kg)   BMI 36.73 kg/m²     Post Debridement Measurements and Assessment:  Wound 10/17/17 Other (Comment) Foot Right; Outer #1 right lateral foot (Dr Nadeen Barbosa removed hardware on 11/3/17) (Active)   Wound Image   2017  8:19 AM   Wound Type Wound 2017  8:19 AM   Wound Other 2017  8:19 AM   Dressing Status Old drainage 2017  8:19 AM   Dressing Changed Changed/New 2017  2:59 PM   Dressing/Treatment Xeroform;ABD; Medipore 2017  2:59 PM   Wound Cleansed Rinsed/Irrigated with saline 2017  8:19 AM   Wound Length (cm) 3 cm 2017  8:44 AM   Wound Width (cm) 0.5 cm 2017  8:44 AM   Wound Depth (cm)  0.2 2017  8:44 AM   Calculated Wound Size (cm^2) (l*w) 1.5 cm^2 2017  8:44 AM   Change in Wound Size % (l*w) 90 2017  8:44 AM   Distance Tunneling (cm) 0 cm 2017  2:41 PM   Tunneling Position ___ O'Clock 0 2017  2:41 PM   Undermining Starts ___ O'Clock 0 2017  2:41 PM   Undermining Ends___ O'Clock 0 2017  2:41 PM

## 2017-12-19 ENCOUNTER — HOSPITAL ENCOUNTER (OUTPATIENT)
Dept: WOUND CARE | Age: 72
Discharge: HOME | End: 2017-12-19
Payer: MEDICARE

## 2017-12-19 VITALS
HEART RATE: 69 BPM | TEMPERATURE: 97.2 F | SYSTOLIC BLOOD PRESSURE: 128 MMHG | DIASTOLIC BLOOD PRESSURE: 82 MMHG | WEIGHT: 256 LBS | RESPIRATION RATE: 24 BRPM | BODY MASS INDEX: 36.65 KG/M2 | HEIGHT: 70 IN

## 2017-12-19 PROCEDURE — 97597 DBRDMT OPN WND 1ST 20 CM/<: CPT | Performed by: SURGERY

## 2017-12-19 PROCEDURE — 97597 DBRDMT OPN WND 1ST 20 CM/<: CPT

## 2017-12-19 ASSESSMENT — PAIN SCALES - GENERAL: PAINLEVEL_OUTOF10: 0

## 2017-12-19 NOTE — PROGRESS NOTES
O'Clock 0 12/19/2017  8:19 AM   Undermining Maxium Distance (cm) 0 12/19/2017  8:19 AM   Wound Assessment Pink;Slough;Granulation tissue;Drainage 12/19/2017  8:19 AM   Drainage Amount Moderate 12/5/2017  8:19 AM   Drainage Description Serosanguinous 12/19/2017  8:19 AM   Odor None 12/19/2017  8:19 AM   Margins Attached edges 12/19/2017  8:19 AM   Beatriz-wound Assessment Swelling;Pink 12/19/2017  8:19 AM   Non-staged Wound Description Full thickness 12/19/2017  8:19 AM   [place]%Wound Bed 50 12/19/2017  8:19 AM   Red%Wound Bed 0 12/19/2017  8:19 AM   Yellow%Wound Bed 50 12/19/2017  8:19 AM   Black%Wound Bed 0 12/19/2017  8:19 AM   Purple%Wound Bed 0 12/19/2017  8:19 AM   Other%Wound Bed 0 12/19/2017  8:19 AM   Culture Taken Yes 10/17/2017 11:41 AM   Debridement per physician Partial thickness 12/19/2017  9:02 AM   Time out Yes 12/19/2017  9:02 AM   Procedural Pain 0 12/19/2017  9:02 AM   Post procedural Pain 0 12/19/2017  9:02 AM   Number of days: 62      The patients pain isPain Level: 0  . Wound is has moderately improved. Please refer to nursing measurements and assessment regarding wound pre and post debridement. Procedure Note:    Wound #: 1     Debridement: Non-excisional Debridement    Anesthetic: Anesthetic: 2% Lidocaine Gel Topical  Using curette, scissors and forceps the wound was sharply debrided    down through and including the removal of epidermis and dermis. Total Surface Area Debrided:  0.27 sq cm   Devitalized Tissue Debrided:  fibrin, biofilm and slough    Percent of Wound Debrided: 100%      Bleeding: Minimal    Hemostasis:   not needed      Response to treatment:  Well tolerated by patient.       Assessment:      Patient Active Problem List   Diagnosis    CAD (coronary artery disease)    Sleep apnea    Hyperlipidemia    HTN (hypertension)    Open displaced fracture of fifth metatarsal bone    Surgical wound dehiscence    Class 2 obesity in adult    Wound infection after surgery    Open wound of right foot          Plan:          Plan for wound - Dress per physician order  Treatment:     Compression : Yes   Offloading : Yes   Dressing : see AVS   Additional Therapy : none     1. Discussed appropriate home care of this wound. Wound redressed. 2. Patient instructions were given. 3. Follow up: 2 week(s). Mr. Sherly Crawford wound continues to improve. Will dc promogran, and continue xeroform. Continue with compression sock and walking boot. Discharge Instructions       Visit Discharge/Physician Orders    Discharge condition: Stable     Discharge to: Home     Left via:Private automobile     Accompanied by:  spouse     ECF/HHA: [facility]-      Dressing Orders: Right Foot:   Wash with soap and water. Apply Xeroform and dry gauze. Secure with rolled gauze and medipore tape. Change twice daily .        Treatment Orders: Protein rich diet (unless restricted by your physician); Multivitamin daily; Elevate legs when sitting above the level of the heart. No weight bearing.      Dr Alex Homes appt 1/2/17     [facility] followup visit _______2 weeks______________________  (Please note your next appointment above and if you are unable to keep, kindly give a 24 hour notice. Thank you.)        If you experience any of the following, please call the [address] during business hours:    * Increase in Pain  * Temperature over 101  * Increase in drainage from your wound  * Drainage with a foul odor  * Bleeding  * Increase in swelling  * Need for compression bandage changes due to slippage, breakthrough drainage. If you need medical attention outside of the business hours of the [address] please contact your PCP or go to the nearest emergency room.              Electronically signed by Adam Kaiser MD on 12/19/2017 at 9:11 AM

## 2018-01-02 ENCOUNTER — HOSPITAL ENCOUNTER (OUTPATIENT)
Dept: WOUND CARE | Age: 73
Discharge: HOME | End: 2018-01-02
Payer: MEDICARE

## 2018-01-02 VITALS
TEMPERATURE: 97.5 F | WEIGHT: 248 LBS | DIASTOLIC BLOOD PRESSURE: 93 MMHG | HEART RATE: 77 BPM | RESPIRATION RATE: 22 BRPM | BODY MASS INDEX: 35.5 KG/M2 | SYSTOLIC BLOOD PRESSURE: 136 MMHG | HEIGHT: 70 IN

## 2018-01-02 PROCEDURE — 97597 DBRDMT OPN WND 1ST 20 CM/<: CPT

## 2018-01-02 PROCEDURE — 97597 DBRDMT OPN WND 1ST 20 CM/<: CPT | Performed by: SURGERY

## 2018-01-02 ASSESSMENT — PAIN DESCRIPTION - ONSET: ONSET: ON-GOING

## 2018-01-02 ASSESSMENT — PAIN DESCRIPTION - DESCRIPTORS: DESCRIPTORS: SHOOTING

## 2018-01-02 ASSESSMENT — PAIN DESCRIPTION - FREQUENCY: FREQUENCY: INTERMITTENT

## 2018-01-02 ASSESSMENT — PAIN DESCRIPTION - PAIN TYPE: TYPE: ACUTE PAIN

## 2018-01-02 ASSESSMENT — PAIN DESCRIPTION - ORIENTATION: ORIENTATION: RIGHT

## 2018-01-02 ASSESSMENT — PAIN DESCRIPTION - LOCATION: LOCATION: FOOT

## 2018-01-02 ASSESSMENT — PAIN DESCRIPTION - PROGRESSION: CLINICAL_PROGRESSION: NOT CHANGED

## 2018-01-02 ASSESSMENT — PAIN SCALES - GENERAL: PAINLEVEL_OUTOF10: 8

## 2018-01-02 NOTE — PROGRESS NOTES
Wound Care Center   Progress Note and Procedure Note      Alexandra Meigs  AGE: 74 y.o. GENDER: male  : 1945  TODAY'S DATE:  2018    Subjective:        HISTORY of PRESENT ILLNESS HPI   Alexandra Meigs is a 74 y.o. male who presents today for wound evaluation. Wound Type:non-healing surgical  Wound Location:right lateral foot  Modifying factors:edema, venous stasis, chronic pressure, shear force and obesity    Patient Active Problem List   Diagnosis Code    CAD (coronary artery disease) I25.10    Sleep apnea G47.30    Hyperlipidemia E78.5    HTN (hypertension) I10    Open displaced fracture of fifth metatarsal bone S92.353B    Surgical wound dehiscence T81.31XA    Class 2 obesity in adult E66.9    Wound infection after surgery T81. 4XXA    Open wound of right foot S91.301A       ALLERGIES    Allergies   Allergen Reactions    Crestor [Rosuvastatin]      Muscle spasms           Objective:      BP (!) 136/93   Pulse 77   Temp 97.5 °F (36.4 °C)   Resp 22   Ht 5' 10\" (1.778 m)   Wt 248 lb (112.5 kg)   BMI 35.58 kg/m²     Post Debridement Measurements and Assessment:  Wound 10/17/17 Other (Comment) Foot Right; Outer #1 right lateral foot (Dr Gail Quinones removed hardware on 11/3/17) (Active)   Wound Image    2018 10:38 AM   Wound Type Wound 2018 10:38 AM   Wound Other 2017  8:19 AM   Dressing Status Dry; Intact 2018 10:38 AM   Dressing Changed Changed/New 2017  9:25 AM   Dressing/Treatment Xeroform;4x4;Medipore 2017  9:25 AM   Wound Cleansed Other (Comment) 2018 10:38 AM   Wound Length (cm) 0.1 cm 2018 11:07 AM   Wound Width (cm) 0.1 cm 2018 11:07 AM   Wound Depth (cm)  0.1 2018 11:07 AM   Calculated Wound Size (cm^2) (l*w) 0.01 cm^2 2018 11:07 AM   Change in Wound Size % (l*w) 99.93 2018 11:07 AM   Distance Tunneling (cm) 0 cm 2018 10:38 AM   Tunneling Position ___ O'Clock 0 2018 10:38 AM   Undermining Starts ___ O'Clock 0 2018 10:38 for wound - Dress per physician order  Treatment:     Compression : Yes   Offloading : Yes   Dressing : see AVS   Additional Therapy : none     1. Discussed appropriate home care of this wound. Wound redressed. 2. Patient instructions were given. 3. Follow up: 2 week(s). Mr. Radha Cristina wound is much better. He had a stitch that was poking out. This was a prolene stitch, which was removed easily in clinic. Discharge Instructions       Visit Discharge/Physician Orders    Discharge condition: Stable     Discharge to: Home     Left via:Private automobile     Accompanied by:  spouse     ECF/HHA: none     Dressing Orders: Right Foot:   Wash with soap and water. Apply vaseline gauze to wound. Secure gauze and medipore tape. Change twice daily .        Treatment Orders: Protein rich diet (unless restricted by your physician); Multivitamin daily; Elevate legs when sitting above the level of the heart. No weight bearing.         [facility] followup visit _______2 weeks______________________  (Please note your next appointment above and if you are unable to keep, kindly give a 24 hour notice. Thank you.)      If you experience any of the following, please call the [facility] during business hours:    * Increase in Pain  * Temperature over 101  * Increase in drainage from your wound  * Drainage with a foul odor  * Bleeding  * Increase in swelling  * Need for compression bandage changes due to slippage, breakthrough drainage. If you need medical attention outside of the business hours of the [address] please contact your PCP or go to the nearest emergency room.              Electronically signed by Nancy Persaud MD on 1/2/2018 at 11:15 AM

## 2018-01-30 ENCOUNTER — HOSPITAL ENCOUNTER (OUTPATIENT)
Dept: WOUND CARE | Age: 73
Discharge: HOME | End: 2018-01-30
Payer: MEDICARE

## 2018-01-30 VITALS
RESPIRATION RATE: 18 BRPM | BODY MASS INDEX: 35.5 KG/M2 | HEIGHT: 70 IN | DIASTOLIC BLOOD PRESSURE: 96 MMHG | HEART RATE: 73 BPM | WEIGHT: 248 LBS | SYSTOLIC BLOOD PRESSURE: 140 MMHG | TEMPERATURE: 97.2 F

## 2018-01-30 PROCEDURE — 99211 OFF/OP EST MAY X REQ PHY/QHP: CPT | Performed by: SURGERY

## 2018-01-30 PROCEDURE — 99212 OFFICE O/P EST SF 10 MIN: CPT

## 2018-01-30 ASSESSMENT — PAIN SCALES - GENERAL: PAINLEVEL_OUTOF10: 0

## 2018-01-30 NOTE — PROGRESS NOTES
AM   Undermining Maxium Distance (cm) 0 1/2/2018 10:38 AM   Wound Assessment Epithelialization 1/30/2018  8:30 AM   Drainage Amount Scant 1/2/2018 10:38 AM   Drainage Description Serosanguinous 12/19/2017  8:19 AM   Odor None 1/2/2018 10:38 AM   Margins Attached edges 1/2/2018 10:38 AM   Beatriz-wound Assessment Pink 1/2/2018 10:38 AM   Non-staged Wound Description Full thickness 1/2/2018 10:38 AM   [place]%Wound Bed 95 1/2/2018 10:38 AM   Red%Wound Bed 0 1/2/2018 10:38 AM   Yellow%Wound Bed 5 1/2/2018 10:38 AM   Black%Wound Bed 0 1/2/2018 10:38 AM   Purple%Wound Bed 0 1/2/2018 10:38 AM   Other%Wound Bed 0 12/19/2017  8:19 AM   Culture Taken Yes 10/17/2017 11:41 AM   Debridement per physician Partial thickness 1/2/2018 11:07 AM   Time out Yes 1/2/2018 11:07 AM   Procedural Pain 0 1/2/2018 11:07 AM   Post procedural Pain 0 1/2/2018 11:07 AM   Number of days: 104      The patients pain isPain Level: 0  . Wound is healed. Assessment:      Patient Active Problem List   Diagnosis    CAD (coronary artery disease)    Sleep apnea    Hyperlipidemia    HTN (hypertension)    Open displaced fracture of fifth metatarsal bone    Surgical wound dehiscence    Class 2 obesity in adult    Wound infection after surgery    Open wound of right foot          Plan:          Plan for wound - Dress per physician order  Treatment:     Compression : Yes   Offloading : Yes   Dressing : see AVS   Additional Therapy : none     1. Discussed appropriate home care of this wound. Wound redressed. 2. Patient instructions were given. 3. Follow up: as needed    Mr. Gan wound is healed! I discussed with him that he needs to keep being mindful of the area, wearing his compression socks and checking his feet at night. It is okay for him to have activity as tolerated including PT. We will have him follow up as needed, and call for any questions or concerns.     Electronically signed by Waleska Grimm MD on 1/30/2018 at 9:17 AM

## 2018-06-08 ENCOUNTER — HOSPITAL ENCOUNTER (EMERGENCY)
Age: 73
Discharge: HOME | End: 2018-06-08
Attending: EMERGENCY MEDICINE
Payer: MEDICARE

## 2018-06-08 VITALS
BODY MASS INDEX: 35.7 KG/M2 | WEIGHT: 255 LBS | SYSTOLIC BLOOD PRESSURE: 126 MMHG | RESPIRATION RATE: 20 BRPM | HEIGHT: 71 IN | OXYGEN SATURATION: 95 % | TEMPERATURE: 98.7 F | DIASTOLIC BLOOD PRESSURE: 78 MMHG | HEART RATE: 78 BPM

## 2018-06-08 DIAGNOSIS — T78.40XA ALLERGIC REACTION, INITIAL ENCOUNTER: Primary | ICD-10-CM

## 2018-06-08 PROCEDURE — 96372 THER/PROPH/DIAG INJ SC/IM: CPT

## 2018-06-08 PROCEDURE — 6370000000 HC RX 637 (ALT 250 FOR IP): Performed by: EMERGENCY MEDICINE

## 2018-06-08 PROCEDURE — 6360000002 HC RX W HCPCS: Performed by: EMERGENCY MEDICINE

## 2018-06-08 PROCEDURE — 99282 EMERGENCY DEPT VISIT SF MDM: CPT

## 2018-06-08 PROCEDURE — 99283 EMERGENCY DEPT VISIT LOW MDM: CPT | Performed by: EMERGENCY MEDICINE

## 2018-06-08 RX ORDER — FAMOTIDINE 20 MG/1
20 TABLET, FILM COATED ORAL ONCE
Status: COMPLETED | OUTPATIENT
Start: 2018-06-08 | End: 2018-06-08

## 2018-06-08 RX ORDER — DIPHENHYDRAMINE HCL 25 MG
25 CAPSULE ORAL EVERY 6 HOURS
Qty: 20 CAPSULE | Refills: 0 | Status: SHIPPED | OUTPATIENT
Start: 2018-06-08 | End: 2018-06-13

## 2018-06-08 RX ORDER — METHYLPREDNISOLONE SODIUM SUCCINATE 125 MG/2ML
125 INJECTION, POWDER, LYOPHILIZED, FOR SOLUTION INTRAMUSCULAR; INTRAVENOUS ONCE
Status: COMPLETED | OUTPATIENT
Start: 2018-06-08 | End: 2018-06-08

## 2018-06-08 RX ORDER — PREDNISONE 20 MG/1
60 TABLET ORAL DAILY
Qty: 15 TABLET | Refills: 0 | Status: SHIPPED | OUTPATIENT
Start: 2018-06-08 | End: 2018-06-13

## 2018-06-08 RX ORDER — DIPHENHYDRAMINE HYDROCHLORIDE 50 MG/ML
25 INJECTION INTRAMUSCULAR; INTRAVENOUS ONCE
Status: COMPLETED | OUTPATIENT
Start: 2018-06-08 | End: 2018-06-08

## 2018-06-08 RX ORDER — FAMOTIDINE 20 MG/1
20 TABLET, FILM COATED ORAL 2 TIMES DAILY
Qty: 10 TABLET | Refills: 0 | Status: SHIPPED | OUTPATIENT
Start: 2018-06-08 | End: 2020-07-15

## 2018-06-08 RX ADMIN — FAMOTIDINE 20 MG: 20 TABLET ORAL at 07:57

## 2018-06-08 RX ADMIN — DIPHENHYDRAMINE HYDROCHLORIDE 25 MG: 50 INJECTION, SOLUTION INTRAMUSCULAR; INTRAVENOUS at 07:46

## 2018-06-08 RX ADMIN — METHYLPREDNISOLONE SODIUM SUCCINATE 125 MG: 125 INJECTION, POWDER, FOR SOLUTION INTRAMUSCULAR; INTRAVENOUS at 07:46

## 2018-06-08 ASSESSMENT — ENCOUNTER SYMPTOMS
DIARRHEA: 0
ABDOMINAL PAIN: 0
SORE THROAT: 0
NAUSEA: 0
SHORTNESS OF BREATH: 0
RHINORRHEA: 0
VOMITING: 0
BACK PAIN: 0

## 2018-06-09 LAB
EKG P AXIS: 54 DEGREES
EKG P-R INTERVAL: 194 MS
EKG Q-T INTERVAL: 366 MS
EKG QRS DURATION: 126 MS
EKG QTC CALCULATION (BAZETT): 381 MS
EKG T AXIS: 146 DEGREES

## 2018-07-10 ENCOUNTER — OFFICE VISIT (OUTPATIENT)
Dept: CARDIOLOGY | Age: 73
End: 2018-07-10
Payer: MEDICARE

## 2018-07-10 VITALS
BODY MASS INDEX: 36.12 KG/M2 | SYSTOLIC BLOOD PRESSURE: 136 MMHG | HEIGHT: 71 IN | HEART RATE: 67 BPM | DIASTOLIC BLOOD PRESSURE: 78 MMHG | WEIGHT: 258 LBS

## 2018-07-10 DIAGNOSIS — E78.2 MIXED HYPERLIPIDEMIA: ICD-10-CM

## 2018-07-10 DIAGNOSIS — Z95.1 HX OF CABG: ICD-10-CM

## 2018-07-10 DIAGNOSIS — I10 ESSENTIAL HYPERTENSION: ICD-10-CM

## 2018-07-10 DIAGNOSIS — R07.9 CHEST PAIN, UNSPECIFIED TYPE: Primary | ICD-10-CM

## 2018-07-10 DIAGNOSIS — I25.10 CORONARY ARTERY DISEASE INVOLVING NATIVE CORONARY ARTERY OF NATIVE HEART, ANGINA PRESENCE UNSPECIFIED: ICD-10-CM

## 2018-07-10 PROCEDURE — 4040F PNEUMOC VAC/ADMIN/RCVD: CPT | Performed by: NURSE PRACTITIONER

## 2018-07-10 PROCEDURE — 99212 OFFICE O/P EST SF 10 MIN: CPT | Performed by: NURSE PRACTITIONER

## 2018-07-10 PROCEDURE — G8417 CALC BMI ABV UP PARAM F/U: HCPCS | Performed by: NURSE PRACTITIONER

## 2018-07-10 PROCEDURE — 3017F COLORECTAL CA SCREEN DOC REV: CPT | Performed by: NURSE PRACTITIONER

## 2018-07-10 PROCEDURE — 93000 ELECTROCARDIOGRAM COMPLETE: CPT | Performed by: NURSE PRACTITIONER

## 2018-07-10 PROCEDURE — G8427 DOCREV CUR MEDS BY ELIG CLIN: HCPCS | Performed by: NURSE PRACTITIONER

## 2018-07-10 PROCEDURE — 1123F ACP DISCUSS/DSCN MKR DOCD: CPT | Performed by: NURSE PRACTITIONER

## 2018-07-10 PROCEDURE — 1036F TOBACCO NON-USER: CPT | Performed by: NURSE PRACTITIONER

## 2018-07-10 PROCEDURE — G8598 ASA/ANTIPLAT THER USED: HCPCS | Performed by: NURSE PRACTITIONER

## 2018-07-10 NOTE — PATIENT INSTRUCTIONS
Continue current medications as prescribed. Continue to follow up with primary care provider for non cardiac medical problems. Call the office with any problems, questions or concerns at [phone]. Follow up as scheduled with your cardiologist - Dr. Juan C Ventura as scheduled. The following educational material has been included in this after visit summary for your review: heart health.     Additional instructions:  Coronary artery disease risk factors you can control: Smoking, high blood pressure, high cholesterol, diabetes, being overweight, lack of exercise and stress. Continue heart healthy diet. Take medications as directed. Exercise as tolerated. Strive for 15 minutes of exercise most days of the week. If asked to keep a blood pressure log, do so for 2 weeks. Call the office to report readings at [phone]. Blood pressure goal is 140/90 or less. If you are a diabetic, the goal is 130/80 or less. If you are taking cholesterol lowering medications, it is recommended that lab work be checked annually. Always keep a current medication list. Bring your medications to every office visit.      How to take:  NITROGLYCERIN (Nitrostat) 0.4 mg tablets, sublingual.  Nitroglycerin is in a group of drugs called nitrates. Nitroglycerin dilates (widens) blood vessels, making it easier for blood to flow through them and easier for the heart to pump. Dosing Guidelines for Nitroglycerin Tablets  · At the start of an angina (chest pain) attack, place one tablet under the tongue or between the cheek and gum. Do not swallow or chew the tablet; let it dissolve on its own. If necessary, a second and third tablet may be used, with five minutes between using each tablet. If you use a third tablet and your chest pain continues, it is time to seek immediate medical attention. Call 911 immediately and have someone drive you to the emergency room. You may be having a heart attack or other serious heart problem.   · To risk for heart disease. Eat at least 2 servings of fish a week. Aristes, mackerel, herring, sardines, and chunk light tuna are very good choices. These fish contain omega-3 fatty acids.     · Eat a variety of fruit and vegetables every day. Dark green, deep orange, red, or yellow fruits and vegetables are especially good for you. Examples include spinach, carrots, peaches, and berries.     · Foods high in fiber can reduce your cholesterol and provide important vitamins and minerals. High-fiber foods include whole-grain cereals and breads, oatmeal, beans, brown rice, citrus fruits, and apples.     · Limit drinks and foods with added sugar. These include candy, desserts, and soda pop.    Lifestyle changes    · If your doctor recommends it, get more exercise. Walking is a good choice. Bit by bit, increase the amount you walk every day. Try for at least 30 minutes on most days of the week. You also may want to swim, bike, or do other activities.     · Do not smoke. If you need help quitting, talk to your doctor about stop-smoking programs and medicines. These can increase your chances of quitting for good. Quitting smoking may be the most important step you can take to protect your heart. It is never too late to quit. You will get health benefits right away.     · Limit alcohol to 2 drinks a day for men and 1 drink a day for women. Too much alcohol can cause health problems. Medicines    · Take your medicines exactly as prescribed. Call your doctor if you think you are having a problem with your medicine.     · If your doctor recommends aspirin, take the amount directed each day. Make sure you take aspirin and not another kind of pain reliever, such as acetaminophen (Tylenol). If you take ibuprofen (such as Advil or Motrin) for other problems, take aspirin at least 2 hours before taking ibuprofen. When should you call for help? Call 911 if you have symptoms of a heart attack.  These may include:    · Chest pain or

## 2018-07-10 NOTE — PROGRESS NOTES
stenosis     Claustrophobia     Depression     Diverticulitis     HTN (hypertension)     Hyperlipidemia     Dr. Sejal Pompa manages    MI (myocardial infarction)     MI, old 2003    Sleep apnea     wear CPAP     Past Surgical History:   Procedure Laterality Date    CARDIAC CATHETERIZATION  01/2011    Chronic occusions med rx for now   [address] GRAFT  2003    In  [address] [address] Right     [address] / ANKLE Right 9/14/2017    HARDWARE REMOVAL AND REVISION ORIF 5TH METATARSAL performed by Jud Parsons DO at [facility] HARDWARE FROM TOE Right 11/3/2017    INCISION AND DRAINAGE REMOVAL INFECTED HARDWARE RIGHT FOOT performed by Jud Parsons DO at [address] [place] [address]     History reviewed. No pertinent family history. Social History   Substance Use Topics    Smoking status: Former Smoker     Quit date: 12/1/2002    Smokeless tobacco: Never Used    Alcohol use No      Current Outpatient Prescriptions   Medication Sig Dispense Refill    famotidine (PEPCID) 20 MG tablet Take 1 tablet by mouth 2 times daily for 5 days 10 tablet 0    ranolazine (RANEXA) 500 MG extended release tablet Take 1,000 mg by mouth 2 times daily      amLODIPine (NORVASC) 5 MG tablet Take 2.5 mg by mouth daily       omeprazole (PRILOSEC) 20 MG capsule Take 20 mg by mouth daily.  metoprolol (LOPRESSOR) 50 MG tablet Take 25 mg by mouth 2 times daily One half tablet twice daily.  lisinopril (PRINIVIL;ZESTRIL) 10 MG tablet Take 10 mg by mouth daily.  dipyridamole (PERSANTINE) 75 MG tablet Take 75 mg by mouth 3 times daily.  Omega-3 Fatty Acids (FISH OIL) 1000 MG CAPS Take 1,000 mg by mouth 2 times daily.  aspirin 325 MG tablet Take 325 mg by mouth daily.       Coenzyme Q10 (CO Q 10) 10 MG CAPS Take 1 capsule by mouth daily       atorvastatin (LIPITOR) 40 MG tablet Take 20 mg by mouth every No discharge, edema or subconjunctival hemorrhage. Neck - Symmetrical without apparent mass or lymphadenopathy. Respiratory - Normal respiratory effort without use of accessory muscles. Ausculatation reveals vesicular breath sounds without crackles, wheezes, rub or rhonchi. Cardiovascular  No jugular venous distention. Auscultation reveals regular rate and rhythm. No audible clicks, gallop or rub. No murmur. No lower extremity varicosities. No carotid bruits. Abdominal -  No visible distention, mass or pulsations. Extremities - No clubbing or cyanosis. No statis dermatitis or ulcers. No edema. Musculoskeletal -   No Osler's nodes. No kyphosis or scoliosis. Gait is even and regular without limp or shuffle. Ambulates without assistance. Skin -  Warm and dry; no rash or pallor. No new surgical wound. Neurological - No focal neurological deficits. Thought processes coherent. No apparent tremor. Oriented to person, place and time. Psychiatric -  Appropriate affect and mood. Assessment:     Diagnosis Orders   1. Chest pain, unspecified type  NM MYOCARDIAL SPECT REST EXERCISE OR RX   2. Essential hypertension  EKG 12 lead   3. Coronary artery disease involving native coronary artery of native heart, angina presence unspecified  NM MYOCARDIAL SPECT REST EXERCISE OR RX   4. Mixed hyperlipidemia     5. Hx of CABG       EKG reviewed:  NSR 67 BPM; T wave abnormality; no acute ischemic changes; no ectopy. Chest pain atypical for myocardial ischemia. Schedule Lexiscan to further evaluate symptoms. Tolerating statin - [facility]. CAD medical management includes BB, ACE, statin and ASA. BP well controlled on current regimen. Stable CV status without symptoms of overt heart failure or arrhythmia. Patient is compliant with medication regimen.     BP Readings from Last 3 Encounters:   07/10/18 136/78   06/08/18 126/78   01/30/18 (!) 140/96    Pulse Readings from Last 3 Encounters:   07/10/18 67   06/08/18 78   01/30/18 73        Wt Readings from Last 3 Encounters:   07/10/18 258 lb (117 kg)   06/08/18 255 lb (115.7 kg)   01/30/18 248 lb (112.5 kg)       Recent Results (from the past 1008 hour(s))   EKG 12 Lead    Collection Time: 06/08/18  7:43 AM   Result Value Ref Range    P-R Interval 194 ms    QRS Duration 126 ms    Q-T Interval 366 ms    QTc Calculation (Bazett) 381 ms    P Axis 54 degrees    T Axis 146 degrees     Plan  Previous cardiac history and records reviewed. Continue current medications as prescribed. Lexiscan to be scheduled. Continue to follow up with primary care provider for non cardiac medical problems. Call the office with any problems, questions or concerns at [phone]. Follow up as scheduled with your cardiologist - Dr. Roshan Frye as scheduled. The following educational material has been included in this after visit summary for your review: heart health.     Additional instructions:  Coronary artery disease risk factors you can control: Smoking, high blood pressure, high cholesterol, diabetes, being overweight, lack of exercise and stress. Continue heart healthy diet. Take medications as directed. Exercise as tolerated. Strive for 15 minutes of exercise most days of the week. If asked to keep a blood pressure log, do so for 2 weeks. Call the office to report readings at [phone]. Blood pressure goal is 140/90 or less. If you are a diabetic, the goal is 130/80 or less. If you are taking cholesterol lowering medications, it is recommended that lab work be checked annually. Always keep a current medication list. Bring your medications to every office visit.      How to take:  NITROGLYCERIN (Nitrostat) 0.4 mg tablets, sublingual.  Nitroglycerin is in a group of drugs called nitrates. Nitroglycerin dilates (widens) blood vessels, making it easier for blood to flow through them and easier for the heart to pump.   Dosing Guidelines for Nitroglycerin

## 2018-07-31 ENCOUNTER — HOSPITAL ENCOUNTER (OUTPATIENT)
Dept: NON INVASIVE DIAGNOSTICS | Age: 73
Discharge: HOME | End: 2018-07-31
Payer: MEDICARE

## 2018-07-31 ENCOUNTER — HOSPITAL ENCOUNTER (OUTPATIENT)
Dept: NUCLEAR MEDICINE | Age: 73
Discharge: HOME | End: 2018-08-02
Payer: MEDICARE

## 2018-07-31 ENCOUNTER — HOSPITAL ENCOUNTER (OUTPATIENT)
Dept: NUCLEAR MEDICINE | Age: 73
End: 2018-07-31
Payer: MEDICARE

## 2018-07-31 DIAGNOSIS — R07.9 CHEST PAIN, UNSPECIFIED TYPE: ICD-10-CM

## 2018-07-31 DIAGNOSIS — I25.10 CORONARY ARTERY DISEASE INVOLVING NATIVE CORONARY ARTERY OF NATIVE HEART, ANGINA PRESENCE UNSPECIFIED: ICD-10-CM

## 2018-07-31 PROCEDURE — A9500 TC99M SESTAMIBI: HCPCS | Performed by: NURSE PRACTITIONER

## 2018-07-31 PROCEDURE — 6360000002 HC RX W HCPCS: Performed by: NURSE PRACTITIONER

## 2018-07-31 PROCEDURE — 3430000000 HC RX DIAGNOSTIC RADIOPHARMACEUTICAL: Performed by: NURSE PRACTITIONER

## 2018-07-31 PROCEDURE — 93017 CV STRESS TEST TRACING ONLY: CPT

## 2018-07-31 PROCEDURE — 78452 HT MUSCLE IMAGE SPECT MULT: CPT

## 2018-07-31 RX ADMIN — REGADENOSON 0.4 MG: 0.08 INJECTION, SOLUTION INTRAVENOUS at 10:13

## 2018-07-31 RX ADMIN — TETRAKIS(2-METHOXYISOBUTYLISOCYANIDE)COPPER(I) TETRAFLUOROBORATE 10 MILLICURIE: 1 INJECTION, POWDER, LYOPHILIZED, FOR SOLUTION INTRAVENOUS at 10:13

## 2018-07-31 RX ADMIN — TETRAKIS(2-METHOXYISOBUTYLISOCYANIDE)COPPER(I) TETRAFLUOROBORATE 30 MILLICURIE: 1 INJECTION, POWDER, LYOPHILIZED, FOR SOLUTION INTRAVENOUS at 10:13

## 2018-08-01 ENCOUNTER — TELEPHONE (OUTPATIENT)
Dept: CARDIOLOGY | Age: 73
End: 2018-08-01

## 2018-08-01 LAB
LV EF: 46 %
LVEF MODALITY: NORMAL

## 2018-08-01 NOTE — TELEPHONE ENCOUNTER
Called and spoke with patient, per Dr. Bess Pino recommendation to schedule heart cath. Have patient scheduled for Monday 8/6/18 @ 930. Gave instructions. Patient verbally understood.

## 2018-08-03 NOTE — TELEPHONE ENCOUNTER
Dr. Kacey Olguin states pt DOESN'T NEED HEART CATH. This is old blockage that Dr. Merry Lynn is seeing and pt is aware of this. Pt notified and heart cath cancelled.

## 2018-09-05 ENCOUNTER — OFFICE VISIT (OUTPATIENT)
Dept: CARDIOLOGY | Age: 73
End: 2018-09-05
Payer: MEDICARE

## 2018-09-05 VITALS
DIASTOLIC BLOOD PRESSURE: 78 MMHG | WEIGHT: 257 LBS | HEART RATE: 75 BPM | SYSTOLIC BLOOD PRESSURE: 130 MMHG | HEIGHT: 70 IN | BODY MASS INDEX: 36.79 KG/M2

## 2018-09-05 DIAGNOSIS — R07.89 OTHER CHEST PAIN: Primary | ICD-10-CM

## 2018-09-05 DIAGNOSIS — E78.2 MIXED HYPERLIPIDEMIA: ICD-10-CM

## 2018-09-05 DIAGNOSIS — I25.10 CORONARY ARTERY DISEASE INVOLVING NATIVE CORONARY ARTERY OF NATIVE HEART WITHOUT ANGINA PECTORIS: ICD-10-CM

## 2018-09-05 PROCEDURE — 1123F ACP DISCUSS/DSCN MKR DOCD: CPT | Performed by: INTERNAL MEDICINE

## 2018-09-05 PROCEDURE — 1101F PT FALLS ASSESS-DOCD LE1/YR: CPT | Performed by: INTERNAL MEDICINE

## 2018-09-05 PROCEDURE — 1036F TOBACCO NON-USER: CPT | Performed by: INTERNAL MEDICINE

## 2018-09-05 PROCEDURE — G8427 DOCREV CUR MEDS BY ELIG CLIN: HCPCS | Performed by: INTERNAL MEDICINE

## 2018-09-05 PROCEDURE — 4040F PNEUMOC VAC/ADMIN/RCVD: CPT | Performed by: INTERNAL MEDICINE

## 2018-09-05 PROCEDURE — 99213 OFFICE O/P EST LOW 20 MIN: CPT | Performed by: INTERNAL MEDICINE

## 2018-09-05 PROCEDURE — 3017F COLORECTAL CA SCREEN DOC REV: CPT | Performed by: INTERNAL MEDICINE

## 2018-09-05 PROCEDURE — 93000 ELECTROCARDIOGRAM COMPLETE: CPT | Performed by: INTERNAL MEDICINE

## 2018-09-05 PROCEDURE — G8417 CALC BMI ABV UP PARAM F/U: HCPCS | Performed by: INTERNAL MEDICINE

## 2018-09-05 PROCEDURE — G8598 ASA/ANTIPLAT THER USED: HCPCS | Performed by: INTERNAL MEDICINE

## 2018-09-05 NOTE — LETTER
Dear Velia Okeefe,      Patient Active Problem List   Diagnosis    CAD (coronary artery disease)    Sleep apnea    Mixed hyperlipidemia    HTN (hypertension)    Open displaced fracture of fifth metatarsal bone    Surgical wound dehiscence    Class 2 obesity in adult    Wound infection after surgery    Open wound of right foot    Hx of CABG    Chest pain         Mr. Radha Andersen returns today for yearly follow up in his [facility]. 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 He has chronic dyspnea on exertion with no recent change. He has however sedentary as he has had some foot fractures which preclude him from walking. Denies chest discomfort. On exam his blood pressure is 130/78 with a pulse of 75. He carries 257 pounds on a 5 foot 10 inch frame. There is no JVD or carotid abnormality. Chest is clear to auscultation and sternotomy scar is well-healed. The second heart sounds are normal without murmurs or gallops. There is no significant lower extremity edema. EKG revealed sinus mechanism with an intraventricular conduction delay and diffuse ST-T wave abnormalities. [facility] dual-isotope revealed an ejection fraction of 46% with inferior scar and no ischemia. I discussed with Mr. Radha Andersen is a need for regular exercise and the benefits that provided. Not only does it alter several risk factors, it also can provide early warning not found with sedentary lifestyles. I have made no changes in his regimen and plan to see him in one year. If he has had no recent lipid profile would suggest he have one.        Sincerely yours,    Sabina Bustamante MD  [facility]

## 2019-07-08 NOTE — PROGRESS NOTES
Chief Complaint   Patient presents with   • Colon Cancer Screening     Pt presents today for colon recall-last colon was 8/4/2014; Personal history of colon polyps     Subjective   HPI  Francisco Sinclair is a 73 y.o. male who presents as a referral for preventative maintenance. He has no complaints of nausea or vomiting. No change in bowels. No wt loss. No BRBPR. No melena. There is no family hx for colon cancer. No abdominal pain. There was no Cologuard screening this year.  The patient's last colonoscopy performed on 8/4/2014 with findings of diverticulosis and adenomatous polyp  Past Medical History:   Diagnosis Date   • CAD (coronary artery disease)    • GERD (gastroesophageal reflux disease)    • History of colon polyps    • Hyperlipidemia    • Hypertension      Past Surgical History:   Procedure Laterality Date   • COLONOSCOPY  08/04/2014    Diverticulosis; Questionable polyp on the ileocecal valve-path showed fragments of benign small intestinal mucosa with submucosa, no adenomatous changes identified; One 3mm adenomatous polyp in the ascending colon; One 6mm adenomatous polyp in the hepatic flexure; Repeat 5 years   • COLONOSCOPY  07/14/2010    1cm hyperplastic polyp in the distal transverse colon; Diverticulosis; Repeat 4-5 years   • COLONOSCOPY  06/13/2007    Diverticulosis; Internal hemorrhoids; 7mm hyperplastic rectal polyp; Repeat 3 years   • CORONARY ARTERY BYPASS GRAFT     • FOOT SURGERY         Current Outpatient Medications:   •  amLODIPine (NORVASC) 5 MG tablet, Take 2.5 mg by mouth Daily., Disp: , Rfl:   •  aspirin 325 MG tablet, Take 325 mg by mouth Daily., Disp: , Rfl:   •  atorvastatin (LIPITOR) 40 MG tablet, Take 20 mg by mouth Daily., Disp: , Rfl:   •  Coenzyme Q10 (CO Q 10) 10 MG capsule, Take 1 capsule by mouth Daily., Disp: , Rfl:   •  dipyridamole (PERSANTINE) 75 MG tablet, Take 75 mg by mouth 3 (Three) Times a Day., Disp: , Rfl:   •  lisinopril (PRINIVIL,ZESTRIL) 10 MG tablet, Take 10 mg by  mouth Daily., Disp: , Rfl:   •  metoprolol tartrate (LOPRESSOR) 50 MG tablet, Take 25 mg by mouth 2 (Two) Times a Day., Disp: , Rfl:   •  Omega-3 Fatty Acids (FISH OIL) 1000 MG capsule capsule, Take 1,000 mg by mouth 2 (Two) Times a Day., Disp: , Rfl:   •  omeprazole (priLOSEC) 20 MG capsule, Take 20 mg by mouth Daily., Disp: , Rfl:   •  ranolazine (RANEXA) 500 MG 12 hr tablet, Take 500 mg by mouth 4 (Four) Times a Day., Disp: , Rfl:   •  sodium-potassium-magnesium sulfates (SUPREP BOWEL PREP KIT) 17.5-3.13-1.6 GM/177ML solution oral solution, Take 1 bottle by mouth Every 12 (Twelve) Hours. Split dose prep as directed by office instructions provided.  2 bottles = one kit., Disp: 2 bottle, Rfl: 0  No Known Allergies  Social History     Socioeconomic History   • Marital status: Unknown     Spouse name: Not on file   • Number of children: Not on file   • Years of education: Not on file   • Highest education level: Not on file   Tobacco Use   • Smoking status: Former Smoker     Last attempt to quit: 2003     Years since quittin.0   • Smokeless tobacco: Never Used   Substance and Sexual Activity   • Alcohol use: No     Frequency: Never     Family History   Problem Relation Age of Onset   • Colon cancer Neg Hx    • Colon polyps Neg Hx    • Esophageal cancer Neg Hx    • Liver cancer Neg Hx    • Liver disease Neg Hx    • Rectal cancer Neg Hx    • Stomach cancer Neg Hx        REVIEW OF SYSTEMS  General: well appearing, no fever chills or sweats, no unexplained wt loss  HEENT: no acute visual or hearing disturbances  Cardiovascular: No chest pain or palpitations  Pulmonary: No shortness of breath, coughing, wheezing or hemoptysis  : No burning, urgency, hematuria, or dysuria  Musculoskeletal: No joint pain or stiffness  Peripheral: no edema  Skin: No lesions or rashes  Neuro: No dizziness, headaches, stroke, syncope  Endocrine: No hot or cold intolerances  Hematological: No blood dyscrasias    Objective   Vitals:  "   07/09/19 0838   BP: 126/80   BP Location: Left arm   Patient Position: Sitting   Cuff Size: Adult   Pulse: 64   Temp: 97.8 °F (36.6 °C)   TempSrc: Tympanic   SpO2: 98%   Weight: 115 kg (254 lb)   Height: 182.9 cm (72\")     Body mass index is 34.45 kg/m².    PHYSICAL EXAM  General: age appropriate well nourished well appearing, no acute distress  Head: normocephalic and atraumatic  Global assessment-supple  Neck-No JVD noted, no lymphadenopathy  Pulmonary-clear to auscultation bilaterally, normal respiratory effort  Cardiovascular-normal rate and rhythm, normal heart sounds, S1 and S2 noted  Abdomen-soft, non tender, non distended, normal bowel sounds all 4 quadrants, no hepatosplenomegaly noted  Extremities-No clubbing cyanosis or edema  Neuro-Non focal, converses appropriately, awake, alert, oriented    Imaging Results (most recent)     None        Assessment/Plan   Francisco was seen today for colon cancer screening.    Diagnoses and all orders for this visit:    Hx of colonic polyps  -     Case Request; Standing  -     Case Request    Other orders  -     Follow Anesthesia Guidelines / Standing Orders; Future  -     Obtain Informed Consent; Future  -     Implement Anesthesia Orders Day of Procedure; Standing  -     Obtain Informed Consent; Standing  -     Verify bowel prep was successful; Standing  -     sodium-potassium-magnesium sulfates (SUPREP BOWEL PREP KIT) 17.5-3.13-1.6 GM/177ML solution oral solution; Take 1 bottle by mouth Every 12 (Twelve) Hours. Split dose prep as directed by office instructions provided.  2 bottles = one kit.      COLONOSCOPY WITH ANESTHESIA (N/A)       Body mass index is 34.45 kg/m². Patient's Body mass index is 34.45 kg/m². BMI is above normal parameters. Recommendations include: nutrition counseling.      All risks, benefits, alternatives, and indications of colonoscopy procedure have been discussed with the patient. Risks to include perforation of the colon requiring possible " surgery or colostomy, risk of bleeding from biopsies or removal of colon tissue, possibility of missing a colon polyp or cancer, or adverse drug reaction.  Benefits to include the diagnosis and management of disease of the colon and rectum. Alternatives to include barium enema, radiographic evaluation, lab testing or no intervention. Pt verbalizes understanding and agrees.

## 2019-07-09 ENCOUNTER — TELEPHONE (OUTPATIENT)
Dept: GASTROENTEROLOGY | Facility: CLINIC | Age: 74
End: 2019-07-09

## 2019-07-09 ENCOUNTER — OFFICE VISIT (OUTPATIENT)
Dept: GASTROENTEROLOGY | Facility: CLINIC | Age: 74
End: 2019-07-09

## 2019-07-09 VITALS
HEART RATE: 64 BPM | HEIGHT: 72 IN | SYSTOLIC BLOOD PRESSURE: 126 MMHG | BODY MASS INDEX: 34.4 KG/M2 | WEIGHT: 254 LBS | DIASTOLIC BLOOD PRESSURE: 80 MMHG | OXYGEN SATURATION: 98 % | TEMPERATURE: 97.8 F

## 2019-07-09 DIAGNOSIS — Z86.010 HX OF COLONIC POLYPS: Primary | ICD-10-CM

## 2019-07-09 PROBLEM — Z86.0100 HX OF COLONIC POLYPS: Status: ACTIVE | Noted: 2019-07-09

## 2019-07-09 PROCEDURE — S0260 H&P FOR SURGERY: HCPCS | Performed by: NURSE PRACTITIONER

## 2019-07-09 RX ORDER — METOPROLOL TARTRATE 50 MG/1
25 TABLET, FILM COATED ORAL 2 TIMES DAILY
COMMUNITY

## 2019-07-09 RX ORDER — AMLODIPINE BESYLATE 5 MG/1
2.5 TABLET ORAL DAILY
COMMUNITY

## 2019-07-09 RX ORDER — RANOLAZINE 500 MG/1
500 TABLET, EXTENDED RELEASE ORAL 4 TIMES DAILY
COMMUNITY

## 2019-07-09 RX ORDER — ATORVASTATIN CALCIUM 40 MG/1
20 TABLET, FILM COATED ORAL DAILY
COMMUNITY

## 2019-07-09 RX ORDER — OMEPRAZOLE 20 MG/1
20 CAPSULE, DELAYED RELEASE ORAL DAILY
COMMUNITY

## 2019-07-09 RX ORDER — CHLORAL HYDRATE 500 MG
1000 CAPSULE ORAL 2 TIMES DAILY
COMMUNITY

## 2019-07-09 RX ORDER — DIPYRIDAMOLE 75 MG
75 TABLET ORAL 3 TIMES DAILY
COMMUNITY

## 2019-07-09 RX ORDER — ASPIRIN 325 MG
325 TABLET ORAL DAILY
COMMUNITY

## 2019-07-09 RX ORDER — LISINOPRIL 10 MG/1
10 TABLET ORAL DAILY
COMMUNITY

## 2019-07-09 RX ORDER — ASCORBIC ACID 1000 MG
1 TABLET ORAL DAILY
COMMUNITY

## 2019-07-09 RX ORDER — SODIUM, POTASSIUM,MAG SULFATES 17.5-3.13G
1 SOLUTION, RECONSTITUTED, ORAL ORAL EVERY 12 HOURS
Qty: 2 BOTTLE | Refills: 0 | Status: SHIPPED | OUTPATIENT
Start: 2019-07-09 | End: 2019-07-31 | Stop reason: HOSPADM

## 2019-07-31 ENCOUNTER — HOSPITAL ENCOUNTER (OUTPATIENT)
Facility: HOSPITAL | Age: 74
Setting detail: HOSPITAL OUTPATIENT SURGERY
Discharge: HOME | End: 2019-07-31
Attending: INTERNAL MEDICINE | Admitting: INTERNAL MEDICINE

## 2019-07-31 ENCOUNTER — ANESTHESIA EVENT (OUTPATIENT)
Dept: GASTROENTEROLOGY | Facility: HOSPITAL | Age: 74
End: 2019-07-31

## 2019-07-31 ENCOUNTER — ANESTHESIA (OUTPATIENT)
Dept: GASTROENTEROLOGY | Facility: HOSPITAL | Age: 74
End: 2019-07-31

## 2019-07-31 ENCOUNTER — TELEPHONE (OUTPATIENT)
Dept: GASTROENTEROLOGY | Facility: CLINIC | Age: 74
End: 2019-07-31

## 2019-07-31 VITALS
SYSTOLIC BLOOD PRESSURE: 130 MMHG | TEMPERATURE: 97.2 F | HEART RATE: 82 BPM | DIASTOLIC BLOOD PRESSURE: 83 MMHG | HEIGHT: 71 IN | RESPIRATION RATE: 18 BRPM | OXYGEN SATURATION: 95 % | BODY MASS INDEX: 35.43 KG/M2

## 2019-07-31 DIAGNOSIS — Z86.010 HX OF COLONIC POLYPS: ICD-10-CM

## 2019-07-31 PROCEDURE — 45385 COLONOSCOPY W/LESION REMOVAL: CPT | Performed by: INTERNAL MEDICINE

## 2019-07-31 PROCEDURE — 88305 TISSUE EXAM BY PATHOLOGIST: CPT | Performed by: INTERNAL MEDICINE

## 2019-07-31 PROCEDURE — 25010000002 PROPOFOL 10 MG/ML EMULSION: Performed by: NURSE ANESTHETIST, CERTIFIED REGISTERED

## 2019-07-31 RX ORDER — PROPOFOL 10 MG/ML
VIAL (ML) INTRAVENOUS AS NEEDED
Status: DISCONTINUED | OUTPATIENT
Start: 2019-07-31 | End: 2019-07-31 | Stop reason: SURG

## 2019-07-31 RX ORDER — SODIUM CHLORIDE 0.9 % (FLUSH) 0.9 %
3 SYRINGE (ML) INJECTION AS NEEDED
Status: DISCONTINUED | OUTPATIENT
Start: 2019-07-31 | End: 2019-07-31 | Stop reason: HOSPADM

## 2019-07-31 RX ORDER — SODIUM CHLORIDE 9 MG/ML
500 INJECTION, SOLUTION INTRAVENOUS CONTINUOUS PRN
Status: DISCONTINUED | OUTPATIENT
Start: 2019-07-31 | End: 2019-07-31 | Stop reason: HOSPADM

## 2019-07-31 RX ORDER — LIDOCAINE HYDROCHLORIDE 20 MG/ML
INJECTION, SOLUTION INFILTRATION; PERINEURAL AS NEEDED
Status: DISCONTINUED | OUTPATIENT
Start: 2019-07-31 | End: 2019-07-31 | Stop reason: SURG

## 2019-07-31 RX ADMIN — PROPOFOL 50 MG: 10 INJECTION, EMULSION INTRAVENOUS at 08:04

## 2019-07-31 RX ADMIN — PROPOFOL 70 MG: 10 INJECTION, EMULSION INTRAVENOUS at 08:01

## 2019-07-31 RX ADMIN — SODIUM CHLORIDE 500 ML: 9 INJECTION, SOLUTION INTRAVENOUS at 07:54

## 2019-07-31 RX ADMIN — LIDOCAINE HYDROCHLORIDE 50 MG: 20 INJECTION, SOLUTION INFILTRATION; PERINEURAL at 08:01

## 2019-07-31 RX ADMIN — PROPOFOL 30 MG: 10 INJECTION, EMULSION INTRAVENOUS at 08:08

## 2019-07-31 RX ADMIN — PROPOFOL 30 MG: 10 INJECTION, EMULSION INTRAVENOUS at 08:06

## 2019-07-31 RX ADMIN — PROPOFOL 50 MG: 10 INJECTION, EMULSION INTRAVENOUS at 08:03

## 2019-07-31 RX ADMIN — PROPOFOL 50 MG: 10 INJECTION, EMULSION INTRAVENOUS at 08:02

## 2019-07-31 NOTE — ANESTHESIA PREPROCEDURE EVALUATION
Anesthesia Evaluation     Patient summary reviewed   no history of anesthetic complications:  NPO Solid Status: > 8 hours             Airway   Mallampati: II  TM distance: >3 FB  Neck ROM: full  Dental    (+) upper dentures and lower dentures    Pulmonary    (+) sleep apnea,   Cardiovascular   Exercise tolerance: excellent (>7 METS)    (+) hypertension, CAD, CABG (2002), cardiac stents hyperlipidemia,       Neuro/Psych- negative ROS  GI/Hepatic/Renal/Endo    (+) obesity,  GERD,      Musculoskeletal     Abdominal    Substance History      OB/GYN          Other                        Anesthesia Plan    ASA 3     MAC     Anesthetic plan, all risks, benefits, and alternatives have been provided, discussed and informed consent has been obtained with: patient.

## 2019-07-31 NOTE — ANESTHESIA POSTPROCEDURE EVALUATION
Patient: Francisco Sinclair    Procedure Summary     Date:  07/31/19 Room / Location:  [facility] ENDOSCOPY 5 / [facility]    Anesthesia Start:  0756 Anesthesia Stop:  0819    Procedure:  COLONOSCOPY WITH ANESTHESIA (N/A ) Diagnosis:       Hx of colonic polyps      (Hx of colonic polyps [Z86.010])    Surgeon:  Ana Oliva MD Provider:  Jose Carlson CRNA    Anesthesia Type:  MAC ASA Status:  3          Anesthesia Type: MAC  Last vitals  BP   164/94 (07/31/19 0739)   Temp   97.2 °F (36.2 °C) (07/31/19 0739)   Pulse   83 (07/31/19 0739)   Resp   20 (07/31/19 0739)     SpO2   97 % (07/31/19 0739)     Post Anesthesia Care and Evaluation    Patient location during evaluation: PHASE II  Patient participation: complete - patient participated  Level of consciousness: awake  Pain score: 0  Pain management: adequate  Airway patency: patent  Anesthetic complications: No anesthetic complications  PONV Status: none  Cardiovascular status: acceptable  Respiratory status: acceptable  Hydration status: acceptable

## 2019-07-31 NOTE — TELEPHONE ENCOUNTER
Pt's wife, Mariam, just called me-pt had colon this morning. She states that starting about an hour ago pt got very chilled and he just can't quit shaking. She states if he lays completely still and relaxes he doesn't have any shaking, but if he tried to do anything at all the shaking/chills come back. She was asking if this was normal? Pt has no c/o any abdominal pain, fever, or n/v. I told her I would call her back after I could talk to you. Thanks.

## 2019-07-31 NOTE — TELEPHONE ENCOUNTER
No, it's not normal, but this isn't usual either.  It probably isn't anything, but if it persists, would recommend ER eval.  The polyps that I removed weren't even removed with heat, so I am not anticipating any complication from polypectomy.  If develops fever, then needs ER.    Ana Oliva MD

## 2019-08-01 LAB
CYTO UR: NORMAL
LAB AP CASE REPORT: NORMAL
LAB AP CLINICAL INFORMATION: NORMAL
PATH REPORT.FINAL DX SPEC: NORMAL
PATH REPORT.GROSS SPEC: NORMAL

## 2019-11-18 ENCOUNTER — OFFICE VISIT (OUTPATIENT)
Dept: CARDIOLOGY | Age: 74
End: 2019-11-18
Payer: MEDICARE

## 2019-11-18 VITALS
HEART RATE: 65 BPM | SYSTOLIC BLOOD PRESSURE: 122 MMHG | HEIGHT: 72 IN | WEIGHT: 258 LBS | BODY MASS INDEX: 34.95 KG/M2 | DIASTOLIC BLOOD PRESSURE: 82 MMHG

## 2019-11-18 DIAGNOSIS — R94.31 ABNORMAL EKG: ICD-10-CM

## 2019-11-18 DIAGNOSIS — I25.10 CORONARY ARTERY DISEASE INVOLVING NATIVE CORONARY ARTERY OF NATIVE HEART WITHOUT ANGINA PECTORIS: Primary | ICD-10-CM

## 2019-11-18 DIAGNOSIS — Z95.1 HX OF CABG: ICD-10-CM

## 2019-11-18 DIAGNOSIS — Z79.899 ENCOUNTER FOR MONITORING STATIN THERAPY: ICD-10-CM

## 2019-11-18 DIAGNOSIS — E78.2 MIXED HYPERLIPIDEMIA: Primary | ICD-10-CM

## 2019-11-18 DIAGNOSIS — Z51.81 ENCOUNTER FOR MONITORING STATIN THERAPY: ICD-10-CM

## 2019-11-18 PROCEDURE — 99213 OFFICE O/P EST LOW 20 MIN: CPT | Performed by: INTERNAL MEDICINE

## 2019-11-18 PROCEDURE — G8417 CALC BMI ABV UP PARAM F/U: HCPCS | Performed by: INTERNAL MEDICINE

## 2019-11-18 PROCEDURE — G8598 ASA/ANTIPLAT THER USED: HCPCS | Performed by: INTERNAL MEDICINE

## 2019-11-18 PROCEDURE — 93000 ELECTROCARDIOGRAM COMPLETE: CPT | Performed by: INTERNAL MEDICINE

## 2019-11-18 PROCEDURE — 1036F TOBACCO NON-USER: CPT | Performed by: INTERNAL MEDICINE

## 2019-11-18 PROCEDURE — G8484 FLU IMMUNIZE NO ADMIN: HCPCS | Performed by: INTERNAL MEDICINE

## 2019-11-18 PROCEDURE — 4040F PNEUMOC VAC/ADMIN/RCVD: CPT | Performed by: INTERNAL MEDICINE

## 2019-11-18 PROCEDURE — G8427 DOCREV CUR MEDS BY ELIG CLIN: HCPCS | Performed by: INTERNAL MEDICINE

## 2019-11-18 PROCEDURE — 1123F ACP DISCUSS/DSCN MKR DOCD: CPT | Performed by: INTERNAL MEDICINE

## 2020-01-23 ENCOUNTER — TELEPHONE (OUTPATIENT)
Dept: CARDIOLOGY | Age: 75
End: 2020-01-23

## 2020-06-29 ENCOUNTER — HOSPITAL ENCOUNTER (OUTPATIENT)
Dept: NUCLEAR MEDICINE | Age: 75
Discharge: HOME | End: 2020-07-01
Payer: MEDICARE

## 2020-06-30 ENCOUNTER — HOSPITAL ENCOUNTER (OUTPATIENT)
Dept: NUCLEAR MEDICINE | Age: 75
Discharge: HOME | End: 2020-07-02
Payer: MEDICARE

## 2020-06-30 PROCEDURE — 6360000002 HC RX W HCPCS: Performed by: INTERNAL MEDICINE

## 2020-06-30 PROCEDURE — 78452 HT MUSCLE IMAGE SPECT MULT: CPT

## 2020-06-30 PROCEDURE — 3430000000 HC RX DIAGNOSTIC RADIOPHARMACEUTICAL: Performed by: INTERNAL MEDICINE

## 2020-06-30 PROCEDURE — A9500 TC99M SESTAMIBI: HCPCS | Performed by: INTERNAL MEDICINE

## 2020-06-30 RX ADMIN — TETRAKIS(2-METHOXYISOBUTYLISOCYANIDE)COPPER(I) TETRAFLUOROBORATE 30 MILLICURIE: 1 INJECTION, POWDER, LYOPHILIZED, FOR SOLUTION INTRAVENOUS at 11:29

## 2020-06-30 RX ADMIN — REGADENOSON 0.4 MG: 0.08 INJECTION, SOLUTION INTRAVENOUS at 09:41

## 2020-06-30 RX ADMIN — TETRAKIS(2-METHOXYISOBUTYLISOCYANIDE)COPPER(I) TETRAFLUOROBORATE 10 MILLICURIE: 1 INJECTION, POWDER, LYOPHILIZED, FOR SOLUTION INTRAVENOUS at 11:29

## 2020-07-01 LAB
LV EF: 56 %
LVEF MODALITY: NORMAL

## 2020-07-15 ENCOUNTER — OFFICE VISIT (OUTPATIENT)
Dept: CARDIOLOGY | Age: 75
End: 2020-07-15
Payer: MEDICARE

## 2020-07-15 VITALS
BODY MASS INDEX: 35.42 KG/M2 | HEIGHT: 71 IN | WEIGHT: 253 LBS | DIASTOLIC BLOOD PRESSURE: 78 MMHG | SYSTOLIC BLOOD PRESSURE: 124 MMHG | HEART RATE: 70 BPM

## 2020-07-15 PROCEDURE — 1036F TOBACCO NON-USER: CPT | Performed by: CLINICAL NURSE SPECIALIST

## 2020-07-15 PROCEDURE — G8427 DOCREV CUR MEDS BY ELIG CLIN: HCPCS | Performed by: CLINICAL NURSE SPECIALIST

## 2020-07-15 PROCEDURE — 4040F PNEUMOC VAC/ADMIN/RCVD: CPT | Performed by: CLINICAL NURSE SPECIALIST

## 2020-07-15 PROCEDURE — 99213 OFFICE O/P EST LOW 20 MIN: CPT | Performed by: CLINICAL NURSE SPECIALIST

## 2020-07-15 PROCEDURE — G8417 CALC BMI ABV UP PARAM F/U: HCPCS | Performed by: CLINICAL NURSE SPECIALIST

## 2020-07-15 PROCEDURE — 1123F ACP DISCUSS/DSCN MKR DOCD: CPT | Performed by: CLINICAL NURSE SPECIALIST

## 2020-07-15 RX ORDER — LISINOPRIL 20 MG/1
10 TABLET ORAL DAILY
COMMUNITY

## 2020-07-15 RX ORDER — AMLODIPINE BESYLATE 2.5 MG/1
2.5 TABLET ORAL DAILY
COMMUNITY

## 2020-07-15 NOTE — PROGRESS NOTES
[address] [place], [address]  [address]  Phone: [phone]  Fax: [phone]    OFFICE VISIT:  7/15/2020    Harriett Padilla - : 1945    Reason For Visit:  Devan Puckett is a 66 y.o. male who is here for 6 Month Follow-Up (no cardiac symptoms) and Coronary Artery Disease  History of coronary disease with stenting and bypass in . Underwent nuclear stress test 2020 that showed large inferior infarct with no new ischemia. EF 56%. This is unchanged from previous [facility] 2018    He returns to day in follow up   Active and doing well. Working on the farm       "[employer] [address] denies exertional chest pain, shortness of breath, orthopnea, paroxysmal nocturnal dyspnea, syncope, presyncope, arrhythmia, edema and fatigue. The patient denies numbness or weakness to suggest cerebrovascular accident or transient ischemic attack. Mendy Gamez DO is PCP and follows labs including lipids.   [facility]  Harriett Padilla has the following history as recorded in [facility]:    Patient Active Problem List    Diagnosis Date Noted    Hx of CABG 07/10/2018    Chest pain 07/10/2018    Open wound of right foot 2017    Surgical wound dehiscence 10/17/2017    Class 2 obesity in adult 10/17/2017    Wound infection after surgery 10/17/2017    Open displaced fracture of fifth metatarsal bone 2017    CAD (coronary artery disease)     Sleep apnea     Mixed hyperlipidemia     HTN (hypertension)      Past Medical History:   Diagnosis Date    CAD (coronary artery disease)     CABG    Cervical spinal stenosis     Claustrophobia     Depression     Diverticulitis     HTN (hypertension)     Hyperlipidemia     Dr. Ranjan Fagan manages    MI [facility]myocardial infarction) ([facility] [address]     MI, old     Sleep apnea     wear CPAP     Past Surgical History:   Procedure Laterality Date    CARDIAC CATHETERIZATION  2011    Chronic occusions med rx for now  CARDIAC CATHETERIZATION      CORONARY ARTERY BYPASS GRAFT  2003    In  [address] Right     HARDWARE REMOVAL FOOT / ANKLE Right 2017    HARDWARE REMOVAL AND REVISION ORIF 5TH METATARSAL performed by Jean-Claude Goode DO at [address] [facility] [address]      REMOVAL HARDWARE FROM TOE Right 11/3/2017    INCISION AND DRAINAGE REMOVAL INFECTED HARDWARE RIGHT FOOT performed by Jean-Claude Goode DO at [address]     History reviewed. No pertinent family history. Social History     Tobacco Use    Smoking status: Former Smoker     Last attempt to quit: 2002     Years since quittin.6    Smokeless tobacco: Never Used   Substance Use Topics    Alcohol use: No      Current Outpatient Medications   Medication Sig Dispense Refill    amLODIPine (NORVASC) 2.5 MG tablet Take 2.5 mg by mouth daily      lisinopril (PRINIVIL;ZESTRIL) 20 MG tablet Take 20 mg by mouth daily      ranolazine (RANEXA) 500 MG extended release tablet Take 1,000 mg by mouth 2 times daily      omeprazole (PRILOSEC) 20 MG capsule Take 20 mg by mouth daily.  metoprolol (LOPRESSOR) 50 MG tablet Take 25 mg by mouth 2 times daily One half tablet twice daily.  dipyridamole (PERSANTINE) 75 MG tablet Take 75 mg by mouth 3 times daily.  Omega-3 Fatty Acids (FISH OIL) 1000 MG CAPS Take 1,000 mg by mouth 2 times daily.  aspirin 325 MG tablet Take 325 mg by mouth daily.  Coenzyme Q10 (CO Q 10) 10 MG CAPS Take 1 capsule by mouth daily       atorvastatin (LIPITOR) 40 MG tablet Take 20 mg by mouth every evening        No current facility-administered medications for this visit. Allergies: Crestor [rosuvastatin]    Review of Systems  Constitutional - no significant activity change, appetite change, or unexpected weight change. No fever, chills or diaphoresis. No fatigue. HEENT - no significant rhinorrhea or epistaxis. No tinnitus or significant hearing loss.    Eyes - no sudden vision change or amaurosis. Respiratory - no significant wheezing, stridor, apnea or cough. No dyspnea on exertion or shortness of breath. Cardiovascular - no exertional chest pain, orthopnea or PND. No sensation of arrhythmia or slow heart rate. No claudication or leg edema. Gastrointestinal - no abdominal swelling or pain. No blood in stool. No severe constipation, diarrhea, nausea, or vomiting. Genitourinary - no difficulty urinating, dysuria, frequency, or urgency. No flank pain or hematuria. Musculoskeletal - no back pain, gait disturbance, or myalgia. Skin - no color change or rash. No pallor. No new surgical incision. Neurologic - no speech difficulty, facial asymmetry or lateralizing weakness. No seizures, presyncope, syncope, or significant dizziness. Hematologic - no easy bruising or excessive bleeding. Psychiatric - no severe anxiety or insomnia. No confusion. All other review of systems are negative. Objective  Vital Signs - /78   Pulse 70   Ht 5' 11\" (1.803 m)   Wt 253 lb (114.8 kg)   BMI 35.29 kg/m²   General - Gem Vargas is alert, cooperative, and pleasant. Well groomed. No acute distress. Body habitus is overweight. HEENT - The head is normocephalic. No circumoral cyanosis. Dentition is normal.   EYES -  No Xanthelasma, no arcus senilis, no conjunctival hemorrhages or discharge. Neck - Supple, without increased jugular venous pressures. No carotid bruits. No mass. Respiratory - Lungs are clear bilaterally. No wheezes or rales. Normal effort without use of accessory muscles. Cardiovascular - Heart has regular rhythm and rate. No murmurs, rubs or gallops. + pedal pulses and no varicosities. Abdominal -  Soft, nontender, nondistended. Bowel sounds are intact. Extremities - No clubbing, cyanosis, or  edema. Musculoskeletal -  No clubbing . No Osler's nodes. Gait normal .  No kyphosis or scoliosis.    Skin -  no statis ulcers or dermatitis. Neurological - No focal signs are identified. Oriented to person, place and time. Psychiatric -  Appropriate affect and mood. Assessment:     Diagnosis Orders   1. Coronary artery disease involving native coronary artery of native heart without angina pectoris     2. Essential hypertension     3. Mixed hyperlipidemia     4. Hx of CABG       Data:  BP Readings from Last 3 Encounters:   07/15/20 124/78   11/18/19 122/82   09/05/18 130/78    Pulse Readings from Last 3 Encounters:   07/15/20 70   11/18/19 65   09/05/18 75        Wt Readings from Last 3 Encounters:   07/15/20 253 lb (114.8 kg)   11/18/19 258 lb (117 kg)   09/05/18 257 lb (116.6 kg)     Blood pressure and heart rate well controlled. Medical management includes beta-blocker, ACE inhibitor and CCB. On Ranexa as well as statin and aspirin    Recent negative nuclear stress test for ischemia with unchanged previous inferior MI  Active and doing well. No change in activity tolerance. PCP managing labs along with the [place]  [place] taking medications as prescribed  Stable cardiovascular status. No evidence of overt heart failure, angina or dysrhythmia. Plan  Negative stress test for new blockage   Follow up in 6 months with Dr. Rosy Almanza  Call with any questions or concerns  Follow up with [facility], DO for non cardiac problems  Report any new problems  Cardiovascular Fitness-Exercise as tolerated. Strive for 30 minutes of exercise most days of the week. Cardiac / Healthy Diet  Continue current medications as directed  Continue plan of treatment  It is always recommended that you bring your medications bottles with you to each visit - this is for your safety! ALLAN Leon    EMR dragon/transcription disclaimer: Much of this encounter note is electronic transcription/translation of spoken language to printed tach.  Electronic translation of spoken language may be erroneous, or at times, nonsensical words or phrases may be inadvertently transcribed.  Although, I have reviewed the note for such errors, some may still exist.

## 2020-07-15 NOTE — PATIENT INSTRUCTIONS
Negative stress test for new blockage   Follow up in 6 months with Dr. Trinh Bourdon  Call with any questions or concerns  Follow up with [facility], DO for non cardiac problems  Report any new problems  Cardiovascular Fitness-Exercise as tolerated. Strive for 30 minutes of exercise most days of the week. Cardiac / Healthy Diet  Continue current medications as directed  Continue plan of treatment  It is always recommended that you bring your medications bottles with you to each visit - this is for your safety! Patient Education        Coronary Artery Disease: Care Instructions  Your Care Instructions     The heart is a muscle, and like any muscle, it needs blood to work well. Coronary artery disease occurs when the arteries that bring oxygen-rich blood to your heart have a buildup of plaque--deposits of fats and other substances. Plaque can reduce blood flow to the heart muscle. This can cause angina symptoms such as chest pain or pressure. A heart attack can happen if blood flow is completely blocked. You can do a lot to improve your health and prevent a heart attack. Eating healthy food, not smoking, getting regular exercise, and taking your medicine are the main things you can do every day to stay healthy. Follow-up care is a key part of your treatment and safety. Be sure to make and go to all appointments, and call your doctor if you are having problems. It's also a good idea to know your test results and keep a list of the medicines you take. How can you care for yourself at home? Medicines  · Be safe with medicines. Take your medicines exactly as prescribed. Call your doctor if you think you are having a problem with your medicine. You will get more details on the specific medicines your doctor prescribes. · You will take medicines that lower your risk of a heart attack and lower your risk of dying early from heart disease. These medicines include:  ?  Angiotensin-converting enzyme (ACE) inhibitors or and sign in to your [employer] account. Enter O834 in the [employer] box to learn more about \"Coronary Artery Disease: Care Instructions. \"     If you do not have an account, please click on the \"Sign Up Now\" link. Current as of: December 16, 2019               Content Version: 12.5  © 6736-8332 [employer], [employer]. Care instructions adapted under license by [facility] ([facility]). If you have questions about a medical condition or this instruction, always ask your healthcare professional. [address] any warranty or liability for your use of this information.

## 2021-01-19 ENCOUNTER — OFFICE VISIT (OUTPATIENT)
Dept: CARDIOLOGY CLINIC | Age: 76
End: 2021-01-19
Payer: MEDICARE

## 2021-01-19 VITALS
HEIGHT: 71 IN | DIASTOLIC BLOOD PRESSURE: 72 MMHG | SYSTOLIC BLOOD PRESSURE: 136 MMHG | BODY MASS INDEX: 35.42 KG/M2 | HEART RATE: 67 BPM | WEIGHT: 253 LBS | OXYGEN SATURATION: 98 %

## 2021-01-19 DIAGNOSIS — I25.9 ISCHEMIC HEART DISEASE DUE TO CORONARY ARTERY OBSTRUCTION (HCC): ICD-10-CM

## 2021-01-19 DIAGNOSIS — Z95.1 HX OF CABG: ICD-10-CM

## 2021-01-19 DIAGNOSIS — I24.0 ISCHEMIC HEART DISEASE DUE TO CORONARY ARTERY OBSTRUCTION (HCC): ICD-10-CM

## 2021-01-19 DIAGNOSIS — I25.10 CORONARY ARTERY DISEASE INVOLVING NATIVE CORONARY ARTERY OF NATIVE HEART WITHOUT ANGINA PECTORIS: ICD-10-CM

## 2021-01-19 DIAGNOSIS — I10 ESSENTIAL HYPERTENSION: Primary | ICD-10-CM

## 2021-01-19 PROCEDURE — 1036F TOBACCO NON-USER: CPT | Performed by: INTERNAL MEDICINE

## 2021-01-19 PROCEDURE — G8427 DOCREV CUR MEDS BY ELIG CLIN: HCPCS | Performed by: INTERNAL MEDICINE

## 2021-01-19 PROCEDURE — 4040F PNEUMOC VAC/ADMIN/RCVD: CPT | Performed by: INTERNAL MEDICINE

## 2021-01-19 PROCEDURE — 99213 OFFICE O/P EST LOW 20 MIN: CPT | Performed by: INTERNAL MEDICINE

## 2021-01-19 PROCEDURE — G8484 FLU IMMUNIZE NO ADMIN: HCPCS | Performed by: INTERNAL MEDICINE

## 2021-01-19 PROCEDURE — 1123F ACP DISCUSS/DSCN MKR DOCD: CPT | Performed by: INTERNAL MEDICINE

## 2021-01-19 PROCEDURE — 93000 ELECTROCARDIOGRAM COMPLETE: CPT | Performed by: INTERNAL MEDICINE

## 2021-01-19 PROCEDURE — G8417 CALC BMI ABV UP PARAM F/U: HCPCS | Performed by: INTERNAL MEDICINE

## 2021-01-19 RX ORDER — ATORVASTATIN CALCIUM 80 MG/1
80 TABLET, FILM COATED ORAL DAILY
COMMUNITY

## 2021-01-19 NOTE — PATIENT INSTRUCTIONS
[place] [facility] [address] [facility] located on the first floor of [place] [address] through hospital main entrance and turn immediately to your left. Date/Time:     Patient's contact number:  [phone] (home)     Echocardiogram -  No prep. Takes approximately 30 min. An echocardiogram uses sound waves to produce images of your heart. This commonly used test allows your doctor to see how your heart is beating and pumping blood. Your doctor can use the images from an echocardiogram to identify various abnormalities in the heart muscle and valves. This test has 2 parts:   Ø You will be asked to disrobe from the waist up and given a gown to wear. The technologist will then hook up an EKG monitor to you for the entire exam.   Ø You will then have an ultrasound of your heart (echocardiogram) to assess the heart muscle, heart valves and heart function. You may eat and take any medicines before the exam.     If you need to change your appointment, please call outpatient scheduling at [phone].

## 2021-01-19 NOTE — PROGRESS NOTES
HISTORY  70-year-old gentleman with a history of dyslipidemia, prior tobacco abuse, hypertension, obstructive sleep apnea, and coronary disease returns for routine follow-up visit. 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 Subsequently underwent bypass grafting the following months with a mammary placed to the LAD. He has subsequently done well with his last stress testing obtained in July 2019 with an ejection fraction estimated to be 46% without significant ischemia. On return today he relates chronic mild dyspnea on exertion without change while denying any chest discomfort suspicious for ischemia. Apparently his last lipid profile was suboptimal and his atorvastatin was increased from 40-80 mg daily. He has been careful and his response to the covert pandemic. He uses CPAP nightly in address of his obstructive sleep apnea.     PHYSICAL EXAM On exam he carries 253 pounds in a 5 foot 11 inch frame. Pressure is 136/72 pulse of 67. Endomorphic gentleman with a normal male balding pattern. EOMs full, sclerae and conjunctiva normal. PERRLA. Mask in place. Trachea midline with no neck masses. Assessment of internal jugular veins reveals no elevation of central venous pressure at 45 degrees. Carotid pulses normal without delay or bruit. Thyroid normal to palpation. Healed midline sternotomy scar. Chest exam reveals normal respiratory effort, no abnormal breath sounds and normal expiratory phase. No skin lesions seen. PMI normal. S1, S2 normal with a soft gallop rhythm. Abdominal obesity. Normal bowel sounds without palpable mass or bruit. No clubbing or acrocyanosis. No significant lower extremity edema or signs of venous insufficiency. General motor strength appears to be within normal limits. Normal range of motion with normal gait. Alert, oriented x 3, memory and cognition normal as reflected by history and conversation. EKG reveals a sinus rhythm with inferior Q waves consistent with known prior inferior infarct and a pre-transitional Q wave/poor R-wave progression suggestive of previous anterior infarct [unchanged from 2018]. ASSESSMENT/PLAN:   1. Ischemic heart disease - the presence of a gallop rhythm and the above described EKG prompts in order to repeat an echocardiogram, in particular to look at his anterior wall. 2.  Dyslipidemia - monitored and managed by primary care  3. Hypertension - marginal control with desired pressure less than 7:47 systolic. We'll recheck on return  4.   Pandemic response - appropriate by history Medical records reviewed prior to today's clinic visit including visually reviewing recent diagnostic studies such as ECHOs, labs, and angiograms as well as reading previous encounter notes. More than 15 minutes spent face-to-face with patient in evaluating, and carefully explaining problems and the planned approach and the reasons behind the decisions.

## 2021-01-22 ENCOUNTER — HOSPITAL ENCOUNTER (OUTPATIENT)
Dept: NON INVASIVE DIAGNOSTICS | Age: 76
Discharge: HOME | End: 2021-01-22
Payer: MEDICARE

## 2021-01-22 DIAGNOSIS — Z95.1 HX OF CABG: ICD-10-CM

## 2021-01-22 DIAGNOSIS — I25.9 ISCHEMIC HEART DISEASE DUE TO CORONARY ARTERY OBSTRUCTION (HCC): ICD-10-CM

## 2021-01-22 DIAGNOSIS — I25.10 CORONARY ARTERY DISEASE INVOLVING NATIVE CORONARY ARTERY OF NATIVE HEART WITHOUT ANGINA PECTORIS: ICD-10-CM

## 2021-01-22 DIAGNOSIS — I24.0 ISCHEMIC HEART DISEASE DUE TO CORONARY ARTERY OBSTRUCTION (HCC): ICD-10-CM

## 2021-01-22 LAB
LV EF: 43 %
LVEF MODALITY: NORMAL

## 2021-01-22 PROCEDURE — C8929 TTE W OR WO FOL WCON,DOPPLER: HCPCS

## 2021-01-22 PROCEDURE — 6360000004 HC RX CONTRAST MEDICATION: Performed by: INTERNAL MEDICINE

## 2021-01-22 RX ADMIN — PERFLUTREN 1.65 MG: 6.52 INJECTION, SUSPENSION INTRAVENOUS at 09:41

## 2021-02-05 ENCOUNTER — OFFICE VISIT (OUTPATIENT)
Dept: CARDIOLOGY CLINIC | Age: 76
End: 2021-02-05
Payer: MEDICARE

## 2021-02-05 VITALS
WEIGHT: 252 LBS | HEIGHT: 71 IN | DIASTOLIC BLOOD PRESSURE: 84 MMHG | SYSTOLIC BLOOD PRESSURE: 124 MMHG | BODY MASS INDEX: 35.28 KG/M2 | HEART RATE: 65 BPM

## 2021-02-05 DIAGNOSIS — I25.10 CORONARY ARTERY DISEASE INVOLVING NATIVE CORONARY ARTERY OF NATIVE HEART WITHOUT ANGINA PECTORIS: Primary | ICD-10-CM

## 2021-02-05 DIAGNOSIS — Z95.1 HX OF CABG: ICD-10-CM

## 2021-02-05 DIAGNOSIS — I51.9 LV DYSFUNCTION: ICD-10-CM

## 2021-02-05 DIAGNOSIS — I10 ESSENTIAL HYPERTENSION: ICD-10-CM

## 2021-02-05 PROCEDURE — G8484 FLU IMMUNIZE NO ADMIN: HCPCS | Performed by: CLINICAL NURSE SPECIALIST

## 2021-02-05 PROCEDURE — G8427 DOCREV CUR MEDS BY ELIG CLIN: HCPCS | Performed by: CLINICAL NURSE SPECIALIST

## 2021-02-05 PROCEDURE — 4040F PNEUMOC VAC/ADMIN/RCVD: CPT | Performed by: CLINICAL NURSE SPECIALIST

## 2021-02-05 PROCEDURE — 1036F TOBACCO NON-USER: CPT | Performed by: CLINICAL NURSE SPECIALIST

## 2021-02-05 PROCEDURE — 99214 OFFICE O/P EST MOD 30 MIN: CPT | Performed by: CLINICAL NURSE SPECIALIST

## 2021-02-05 PROCEDURE — G8417 CALC BMI ABV UP PARAM F/U: HCPCS | Performed by: CLINICAL NURSE SPECIALIST

## 2021-02-05 PROCEDURE — 1123F ACP DISCUSS/DSCN MKR DOCD: CPT | Performed by: CLINICAL NURSE SPECIALIST

## 2021-02-05 NOTE — PROGRESS NOTES
[address] [place], [address]    Phone: [phone]  Fax: [phone]    OFFICE VISIT:  2021    Jonny Owen - : 1945    Reason For Visit:  Emelia is a 66 y.o. male who is here for Follow-up (1 wk fu  no cardiac symptoms) and Coronary Artery Disease  History of coronary disease with stenting and bypass in . Underwent nuclear stress test 2020 that showed large inferior infarct with no new ischemia. EF 56%. This is unchanged from previous [place] 2018    Patient had routine follow-up with Dr. Lonnie Nick last month. Noticed a gallop rhythm. Recommended 2D echo    2D echo 2021   Moderate concentric left ventricular hypertrophy. Moderate left ventricular global hypokinesis. Left ventricular ejection fraction is visually estimated at 40-45%. Grade 2 LV diastolic dysfunction. The left atrium is mildly dilated. Returns today for follow-up for test results. States overall he is doing well. Has shortness of breath with activity but does not feel it is any worse. Subjective  [place] denies exertional chest pain, resting shortness of breath, orthopnea, paroxysmal nocturnal dyspnea, syncope, presyncope, arrhythmia, edema and fatigue. The patient denies numbness or weakness to suggest cerebrovascular accident or transient ischemic attack. Sobeida Hinds DO is PCP and follows labs including lipids.   Jonny Owen has the following history as recorded in [facility]:    Patient Active Problem List    Diagnosis Date Noted    Hx of CABG 07/10/2018    Chest pain 07/10/2018    Open wound of right foot 2017    Surgical wound dehiscence 10/17/2017    Class 2 obesity in adult 10/17/2017    Wound infection after surgery 10/17/2017    Open displaced fracture of fifth metatarsal bone 2017    CAD (coronary artery disease)     Sleep apnea     Mixed hyperlipidemia     HTN (hypertension)      Past Medical History:   Diagnosis Date  CAD (coronary artery disease)     CABG    Cervical spinal stenosis     Claustrophobia     Depression     Diverticulitis     HTN (hypertension)     Hyperlipidemia     Dr. Beni Baldwin manages    MI (myocardial infarction) ([facility] [address]     MI, old     Sleep apnea     wear CPAP     Past Surgical History:   Procedure Laterality Date    CARDIAC CATHETERIZATION  2011    Chronic occusions med rx for now   [address] GRAFT  2003    In  [address] Right     [address] / ANKLE Right 2017    HARDWARE REMOVAL AND REVISION ORIF 5TH METATARSAL performed by Mayra Benites DO at [facility] HARDWARE FROM TOE Right 11/3/2017    INCISION AND DRAINAGE REMOVAL INFECTED HARDWARE RIGHT FOOT performed by Mayra Benites DO at [address] [place] [address]     History reviewed. No pertinent family history. Social History     Tobacco Use    Smoking status: Former Smoker     Quit date: 2002     Years since quittin.1    Smokeless tobacco: Never Used   Substance Use Topics    Alcohol use: No      Current Outpatient Medications   Medication Sig Dispense Refill    atorvastatin (LIPITOR) 80 MG tablet Take 80 mg by mouth daily      amLODIPine (NORVASC) 2.5 MG tablet Take 2.5 mg by mouth daily      lisinopril (PRINIVIL;ZESTRIL) 20 MG tablet Take 10 mg by mouth daily       ranolazine (RANEXA) 500 MG extended release tablet Take 1,000 mg by mouth 2 times daily      omeprazole (PRILOSEC) 20 MG capsule Take 20 mg by mouth daily.  metoprolol (LOPRESSOR) 50 MG tablet Take 75 mg by mouth 2 times daily One half tablet twice daily.  dipyridamole (PERSANTINE) 75 MG tablet Take 75 mg by mouth 3 times daily.  Omega-3 Fatty Acids (FISH OIL) 1000 MG CAPS Take 1,000 mg by mouth 2 times daily.  aspirin 325 MG tablet Take 325 mg by mouth daily.  Coenzyme Q10 (CO Q 10) 10 MG CAPS Take 1 capsule by mouth daily        No current facility-administered medications for this visit. Allergies: Crestor [rosuvastatin]    Review of Systems  Constitutional  no significant activity change, appetite change, or unexpected weight change. No fever, chills or diaphoresis. No fatigue. HEENT  no significant rhinorrhea or epistaxis. No tinnitus or significant hearing loss. Eyes  no sudden vision change or amaurosis. Respiratory  no significant wheezing, stridor, apnea or cough. No dyspnea on exertion or shortness of breath. Cardiovascular  no exertional chest pain, orthopnea or PND. No sensation of arrhythmia or slow heart rate. No claudication or leg edema. Gastrointestinal  no abdominal swelling or pain. No blood in stool. No severe constipation, diarrhea, nausea, or vomiting. Genitourinary  no difficulty urinating, dysuria, frequency, or urgency. No flank pain or hematuria. Musculoskeletal  no back pain, gait disturbance, or myalgia. Skin  no color change or rash. No pallor. No new surgical incision. Neurologic  no speech difficulty, facial asymmetry or lateralizing weakness. No seizures, presyncope, syncope, or significant dizziness. Hematologic  no easy bruising or excessive bleeding. Psychiatric  no severe anxiety or insomnia. No confusion. All other review of systems are negative. Objective  Vital Signs - /84   Pulse 65   Ht 5' 11\" (1.803 m)   Wt 252 lb (114.3 kg)   BMI 35.15 kg/m²   General - Richrd Hoist is alert, cooperative, and pleasant. Well groomed. No acute distress. Body habitus is overweight. HEENT  The head is normocephalic. No circumoral cyanosis. Dentition is normal.   EYES -  No Xanthelasma, no arcus senilis, no conjunctival hemorrhages or discharge. Neck - Supple, without increased jugular venous pressures. No carotid bruits. No mass. Respiratory - Lungs are clear bilaterally. No wheezes or rales. Normal effort without use of accessory muscles. Cardiovascular  Heart has regular rhythm and rate. No murmurs, rubs or gallops. + pedal pulses and no varicosities. Abdominal -  Soft, nontender, nondistended. Bowel sounds are intact. Extremities - No clubbing, cyanosis, or  edema. Musculoskeletal -  No clubbing . No Osler's nodes. Gait normal .  No kyphosis or scoliosis. Skin -  no statis ulcers or dermatitis. Neurological - No focal signs are identified. Oriented to person, place and time. Psychiatric -  Appropriate affect and mood. Assessment:     Diagnosis Orders   1. Coronary artery disease involving native coronary artery of native heart without angina pectoris  NM MYOCARDIAL SPECT REST EXERCISE OR RX   2. Hx of CABG  NM MYOCARDIAL SPECT REST EXERCISE OR RX   3. Essential hypertension     4. LV dysfunction  NM MYOCARDIAL SPECT REST EXERCISE OR RX     Data:  BP Readings from Last 3 Encounters:   02/05/21 124/84   01/19/21 136/72   07/15/20 124/78    Pulse Readings from Last 3 Encounters:   02/05/21 65   01/19/21 67   07/15/20 70        Wt Readings from Last 3 Encounters:   02/05/21 252 lb (114.3 kg)   01/19/21 253 lb (114.8 kg)   07/15/20 253 lb (114.8 kg)     Reviewed 2D echo with Dr. Arturo Ugarte. Feels his Anterior wall worse and with worsening EF  Recommend repeating Michelle to evaluate for any ischemia. Has been 17+ years since bypass surgery    Blood pressure and heart rate well controlled. Medical management includes beta-blocker, CCB and ACE inhibitor. On long-acting antianginal Ranexa along with aspirin and statin      States taking medications as prescribed  Stable cardiovascular status. No evidence of overt heart failure, angina or dysrhythmia.      Plan  Lexiscan soon   Follow up in 6 mos if it is OK with Dr. Arturo Ugarte   Call with any questions or concerns  Follow up with [facility], DO for non cardiac problems Report any new problems  Cardiovascular Fitness-Exercise as tolerated. Strive for 30 minutes of exercise most days of the week. Cardiac / Healthy Diet  Continue current medications as directed  Continue plan of treatment  It is always recommended that you bring your medications bottles with you to each visit - this is for your safety! ALLAN Arnold dragon/transcription disclaimer: Much of this encounter note is electronic transcription/translation of spoken language to printed tach. Electronic translation of spoken language may be erroneous, or at times, nonsensical words or phrases may be inadvertently transcribed.  Although, I have reviewed the note for such errors, some may still exist.

## 2021-02-05 NOTE — PATIENT INSTRUCTIONS
Armand Riley soon   Follow up in 6 mos if it is OK with Dr. Mariano De Paz   Call with any questions or concerns  Follow up with Florence Bonner, DO for non cardiac problems  Report any new problems  Cardiovascular Fitness-Exercise as tolerated. Strive for 30 minutes of exercise most days of the week. Cardiac / Healthy Diet  Continue current medications as directed  Continue plan of treatment  It is always recommended that you bring your medications bottles with you to each visit - this is for your safety! [facility] at the [facility] and [address] [facility] located on the first floor of [place] [address] through hospital main entrance and turn immediately to your left. Date:    Lexiscan Stress Test      Lexiscan (regadenoson injection) is a prescription drug given through an IV line that increases blood flow through the arteries of the heart during a cardiac nuclear stress test.     There are two parts to a Lexiscan stress test: the rest portion and the exercise portion. For the rest portion, a radioactive tracer is injected into your arm through the IV. After 30 to 60 minutes, the process of imaging will begin. A nuclear camera will be placed on your chest area and images are taken for the next 15 to 20 minutes. For the exercise portion, a nurse will attach EKG electrodes to your chest to monitor your heart rate. The drug [place] is administered to simulate stress on the heart. Your heart rhythm will then be monitored for the next few minutes. Your blood pressure will also be monitored throughout the exercise portion. [place] through the exercise portion, a second round of radioactive tracer is injected into your body. Your heart rate and EKG will be monitored for another few minutes after administering the drug.     Test Preparation: ? Bring a list of your current medications. Do not take any of your medications the morning of the test, but bring all morning medications with you as you will take them after the stress portion of the test is completed. ? Do not eat Bananas 24 hours prior to test.    ? No caffeine 24 hours prior to the testing. This includes: coffee, pop/soda, chocolate, cold medications, etc.  Any product that might contain caffeine. ? No nicotine or alcohol 12 hours prior to your test.   ? Nothing to eat or drink 6-8 hours prior to appointment time. It is okay to drink small amounts of water during the four hours prior to the test.  ? Nitroglycerin patches must be taken off 1 hour before testing. ? Wear comfortable clothing. ? Please refrain from any strenuous exercise or activities the day before your test, or the day of your test.  ? The Nuclear Lexiscan Stress test takes about 2 ½ to 3 hours to complete. If for any reason you are unable to keep this appointment, please contact Outpatient Scheduling, [phone], as soon as possible to reschedule.

## 2021-02-22 ENCOUNTER — HOSPITAL ENCOUNTER (OUTPATIENT)
Dept: NUCLEAR MEDICINE | Age: 76
Discharge: HOME | End: 2021-02-24
Payer: MEDICARE

## 2021-02-22 DIAGNOSIS — I25.10 CORONARY ARTERY DISEASE INVOLVING NATIVE CORONARY ARTERY OF NATIVE HEART WITHOUT ANGINA PECTORIS: ICD-10-CM

## 2021-02-22 DIAGNOSIS — Z95.1 HX OF CABG: ICD-10-CM

## 2021-02-22 DIAGNOSIS — I51.9 LV DYSFUNCTION: ICD-10-CM

## 2021-02-22 PROCEDURE — 3430000000 HC RX DIAGNOSTIC RADIOPHARMACEUTICAL: Performed by: CLINICAL NURSE SPECIALIST

## 2021-02-22 PROCEDURE — 78452 HT MUSCLE IMAGE SPECT MULT: CPT

## 2021-02-22 PROCEDURE — 6360000002 HC RX W HCPCS: Performed by: INTERNAL MEDICINE

## 2021-02-22 PROCEDURE — A9500 TC99M SESTAMIBI: HCPCS | Performed by: CLINICAL NURSE SPECIALIST

## 2021-02-22 RX ADMIN — TETRAKIS(2-METHOXYISOBUTYLISOCYANIDE)COPPER(I) TETRAFLUOROBORATE 10 MILLICURIE: 1 INJECTION, POWDER, LYOPHILIZED, FOR SOLUTION INTRAVENOUS at 10:46

## 2021-02-22 RX ADMIN — TETRAKIS(2-METHOXYISOBUTYLISOCYANIDE)COPPER(I) TETRAFLUOROBORATE 30 MILLICURIE: 1 INJECTION, POWDER, LYOPHILIZED, FOR SOLUTION INTRAVENOUS at 10:46

## 2021-02-22 RX ADMIN — REGADENOSON 0.4 MG: 0.08 INJECTION, SOLUTION INTRAVENOUS at 10:04

## 2021-02-23 ENCOUNTER — TELEPHONE (OUTPATIENT)
Dept: CARDIOLOGY CLINIC | Age: 76
End: 2021-02-23

## 2021-02-23 LAB
LV EF: 35 %
LVEF MODALITY: NORMAL

## 2021-02-23 NOTE — TELEPHONE ENCOUNTER
Sarah Coburn called to schedule a HEART CATH. Please be advised that the best time to call him to accommodate their needs is Anytime. Thank you.

## 2021-03-04 ENCOUNTER — OFFICE VISIT (OUTPATIENT)
Age: 76
End: 2021-03-04

## 2021-03-04 VITALS — OXYGEN SATURATION: 92 % | HEART RATE: 86 BPM | TEMPERATURE: 96.8 F

## 2021-03-04 DIAGNOSIS — Z11.59 SCREENING FOR VIRAL DISEASE: Primary | ICD-10-CM

## 2021-03-04 LAB — SARS-COV-2, PCR: NOT DETECTED

## 2021-03-04 PROCEDURE — 99999 PR OFFICE/OUTPT VISIT,PROCEDURE ONLY: CPT | Performed by: NURSE PRACTITIONER

## 2021-03-07 ASSESSMENT — ENCOUNTER SYMPTOMS
BACK PAIN: 0
WHEEZING: 0
BLOOD IN STOOL: 0
VOMITING: 0
COUGH: 0
ABDOMINAL PAIN: 0
SHORTNESS OF BREATH: 1
DIARRHEA: 0
ABDOMINAL DISTENTION: 0

## 2021-03-07 NOTE — H&P
Patient:  Robert Clemons                  1945  MRN: [id number]    PROBLEM LIST:    Patient Active Problem List    Diagnosis Date Noted    Hx of CABG 07/10/2018     Priority: Low    Chest pain 07/10/2018     Priority: Low    Open wound of right foot 12/05/2017     Priority: Low    Surgical wound dehiscence 10/17/2017     Priority: Low    Class 2 obesity in adult 10/17/2017     Priority: Low    Wound infection after surgery 10/17/2017     Priority: Low    Open displaced fracture of fifth metatarsal bone 09/14/2017     Priority: Low    CAD (coronary artery disease)      Priority: Low     Overview Note:     4/03 CABG  7/31/18 Lexiscan EF 46%, Inferior Scar without Ischemia      Sleep apnea      Priority: Low    Mixed hyperlipidemia      Priority: Low    HTN (hypertension)      Priority: Low       PRESENTATION: Robert Clemons is a 66y.o. year old male who presents with recent onset of mild exertional dyspnea, echocardiogram with new mild worsening in LV systolic function from prior with possible new anterior wall reduced wall motion, abnormal Lexiscan with large inferior infarct being referred by Dr. Sj Malhotra for cardiac catheterization. Patient has a history of hypertension, hyperlipidemia, MAGNOLIA, coronary artery disease with prior inferior MI 2003 with subsequent CABG with prior ejection fraction of 46%, now shown on echo to be 40 to 45%. REVIEW OF SYSTEMS:  Review of Systems   Constitutional: Negative for activity change, diaphoresis and fatigue. HENT: Negative for hearing loss, nosebleeds and tinnitus. Eyes: Negative for visual disturbance. Respiratory: Positive for shortness of breath. Negative for cough and wheezing. Cardiovascular: Negative for chest pain, palpitations and leg swelling. Gastrointestinal: Negative for abdominal distention, abdominal pain, blood in stool, diarrhea and vomiting. Endocrine: Negative for cold intolerance, heat intolerance, polydipsia, polyphagia and polyuria. metoprolol (LOPRESSOR) 50 MG tablet Take 75 mg by mouth 2 times daily One half tablet twice daily. Historical Provider, MD   dipyridamole (PERSANTINE) 75 MG tablet Take 75 mg by mouth 3 times daily. Historical Provider, MD   Omega-3 Fatty Acids (FISH OIL) 1000 MG CAPS Take 1,000 mg by mouth 2 times daily. Historical Provider, MD   aspirin 325 MG tablet Take 325 mg by mouth daily.     Historical Provider, MD   Coenzyme Q10 (CO Q 10) 10 MG CAPS Take 1 capsule by mouth daily     Historical Provider, MD       Allergies:  Crestor [rosuvastatin]    Past Social History:  Social History     Socioeconomic History    Marital status:      Spouse name: Not on file    Number of children: Not on file    Years of education: Not on file    Highest education level: Not on file   Occupational History    Not on file   Social Needs    Financial resource strain: Not on file    Food insecurity     Worry: Not on file     Inability: Not on file    Transportation needs     Medical: Not on file     Non-medical: Not on file   Tobacco Use    Smoking status: Former Smoker     Quit date: 2002     Years since quittin.2    Smokeless tobacco: Never Used   Substance and Sexual Activity    Alcohol use: No    Drug use: No    Sexual activity: Not on file   Lifestyle    Physical activity     Days per week: Not on file     Minutes per session: Not on file    Stress: Not on file   Relationships    Social connections     Talks on phone: Not on file     Gets together: Not on file     Attends Baptism service: Not on file     Active member of club or organization: Not on file     Attends meetings of clubs or organizations: Not on file     Relationship status: Not on file    Intimate partner violence     Fear of current or ex partner: Not on file     Emotionally abused: Not on file     Physically abused: Not on file     Forced sexual activity: Not on file   Other Topics Concern    Not on file   Social History 02THERAPY in the last 72 hours. INR: No results for input(s): INR in the last 72 hours. A1c:Invalid input(s): HEMOGLOBIN A1C  URINALYSIS: No results found for: Hildy Bors, BACTERIA, RBCUA, BLOODU, SPECGRAV, GLUCOSEU  -----------------------------------------------------------------  IMAGING:  No orders to display     [employer] Nuclear Stress Test Report   Procedure date: 02/22/21   Indications: shortness of breath   Procedure: Stress was performed with injection of 0.4 mg Lexiscan. Vital signs and EKG were monitored. Technetium-99 sestamibi was   injected in divided doses, approximately 10 mCi and 30 mCi   respectively for rest and stress imaging. The patient was discharged   in stable condition. Results: Patient had symptoms of dyspnea during infusion that resolved   in recovery. Baseline EKG showed normal sinus rhythm with T-wave   inversion V4-V6 changes. During stress there were no significant EKG   changes or rhythm changes. Baseline and peak blood pressures were   182/101, and 182/101 respectively.  Baseline and peak heart rates were   76 and  96 respectively. Lexiscan/[facility] Nuclear Medicine Report   Date of Procedure: 2/22/2021   The patient was injected with 96.29 millicuries (mCi) of Technetium   (Tc99m).  After an appropriate level of stress the patient was   re-injected with 68.5 millicuries (mCi) of Technetium (Tc99m).  Repeat   gated images were then performed per standard protocol. Findings:   1.  Analysis of the the stress and rest images reveals large inferior   fixed defect. 2.  Analysis of the gated images reveals moderate to severely reduced   left ventricular function with a calculated ejection fraction of 35 %.         Impression   Impression:   There is large inferior infarct with no obvious ischemia, with a   calculated ejection fraction of 35 %.    Suggest: Clinical correlation with no change in defect in inferior   distribution but reduced EF noted since previous [employer] study.   Signed by Dr Nicole Mckeon on 2/22/2021 6:16 PM     Conclusions      Summary   Moderate concentric left ventricular hypertrophy. Moderate left ventricular global hypokinesis. Left ventricular ejection fraction is visually estimated at 40-45%. Grade 2 LV diastolic dysfunction. The left atrium is mildly dilated. Signature      ----------------------------------------------------------------   Electronically signed by Chyna Peters DO(Interpreting   physician) on 01/27/2021 10:45 AM   ----------------------------------------------------------------      Findings      Mitral Valve   Structurally normal mitral valve. There is no mitral regurgitation. Aortic Valve   Structurally normal aortic valve. No aortic regurgitation . Tricuspid Valve   Tricuspid valve was not well visualized. No tricuspid regurgitation. Pulmonic Valve   The pulmonic valve was not well visualized . Left Atrium   The left atrium is mildly dilated. Left Ventricle   Left ventricular chamber size is normal.   Moderate concentric left ventricular hypertrophy. Moderate left ventricular global hypokinesis. Left ventricular ejection fraction is visually estimated at 40-45%. Grade 2 LV diastolic dysfunction. Right Atrium   Normal right atrial size. Right Ventricle   The right ventricle was not well visualized . Pericardial Effusion   No pericardial effusion.       Miscellaneous   Increased IVC diameter consistent with increased right atrial pressure     M-Mode Measurements (cm)      LVIDd: 4.88 cm                       LVIDs: 3.8 cm   IVSd: 1.27 cm   LVPWd: 1.38 cm                       AO Root Dimension: 2.9 cm   % Ejection Fraction: 44 %            LA: 4.5 cm                                        LVOT: 2 cm     Doppler Measurements:       MV Peak E-Wave: 88.7 cm/s    MV Peak A-Wave: 70.7 cm/s    MV E/A Ratio: 1.25 %    MV Peak Gradient: 3.15 mmHg    Assessment and Recommendations: This is a 66y.o. year old male with past medical history of hypertension, hyperlipidemia, coronary artery disease with prior inferior MI 2003 with subsequent CABG, prior ejection fraction 46% with new mild worsening shortness of breath with reported mild decrease in LV systolic function to 40 to 45%, abnormal Lexiscan study, being referred for cardiac catheterization. Risks, benefits, alternatives of cardiac catheterization/PCI discussed with the patient and full informed consent obtained.   Acceptable Mallampati score  Consent for moderate conscious sedation  ASA 3            Electronically signed by Abhijit Watters MD on 3/7/2021 at 9:19 AM

## 2021-03-08 ENCOUNTER — HOSPITAL ENCOUNTER (OUTPATIENT)
Dept: CARDIAC CATH/INVASIVE PROCEDURES | Age: 76
Discharge: HOME | End: 2021-03-08
Attending: INTERNAL MEDICINE | Admitting: INTERNAL MEDICINE
Payer: MEDICARE

## 2021-03-08 VITALS
OXYGEN SATURATION: 96 % | TEMPERATURE: 97.1 F | RESPIRATION RATE: 18 BRPM | BODY MASS INDEX: 34.72 KG/M2 | WEIGHT: 248 LBS | HEIGHT: 71 IN | SYSTOLIC BLOOD PRESSURE: 126 MMHG | HEART RATE: 77 BPM | DIASTOLIC BLOOD PRESSURE: 82 MMHG

## 2021-03-08 LAB
ANION GAP SERPL CALCULATED.3IONS-SCNC: 9 MMOL/L (ref 7–19)
BUN BLDV-MCNC: 21 MG/DL (ref 8–23)
CALCIUM SERPL-MCNC: 8.8 MG/DL (ref 8.8–10.2)
CHLORIDE BLD-SCNC: 104 MMOL/L (ref 98–111)
CO2: 27 MMOL/L (ref 22–29)
CREAT SERPL-MCNC: 1.4 MG/DL (ref 0.5–1.2)
GFR AFRICAN AMERICAN: >59
GFR NON-AFRICAN AMERICAN: 49
GLUCOSE BLD-MCNC: 112 MG/DL (ref 74–109)
HCT VFR BLD CALC: 51 % (ref 42–52)
HEMOGLOBIN: 16.6 G/DL (ref 14–18)
MCH RBC QN AUTO: 30 PG (ref 27–31)
MCHC RBC AUTO-ENTMCNC: 32.5 G/DL (ref 33–37)
MCV RBC AUTO: 92.2 FL (ref 80–94)
PDW BLD-RTO: 13.5 % (ref 11.5–14.5)
PLATELET # BLD: 136 K/UL (ref 130–400)
PMV BLD AUTO: 9.9 FL (ref 9.4–12.4)
POTASSIUM SERPL-SCNC: 4.4 MMOL/L (ref 3.5–5)
RBC # BLD: 5.53 M/UL (ref 4.7–6.1)
SODIUM BLD-SCNC: 140 MMOL/L (ref 136–145)
WBC # BLD: 6.3 K/UL (ref 4.8–10.8)

## 2021-03-08 PROCEDURE — 99152 MOD SED SAME PHYS/QHP 5/>YRS: CPT

## 2021-03-08 PROCEDURE — 36415 COLL VENOUS BLD VENIPUNCTURE: CPT

## 2021-03-08 PROCEDURE — 6360000004 HC RX CONTRAST MEDICATION: Performed by: INTERNAL MEDICINE

## 2021-03-08 PROCEDURE — 93459 L HRT ART/GRFT ANGIO: CPT

## 2021-03-08 PROCEDURE — 6360000002 HC RX W HCPCS

## 2021-03-08 PROCEDURE — C1760 CLOSURE DEV, VASC: HCPCS

## 2021-03-08 PROCEDURE — 99152 MOD SED SAME PHYS/QHP 5/>YRS: CPT | Performed by: INTERNAL MEDICINE

## 2021-03-08 PROCEDURE — 93459 L HRT ART/GRFT ANGIO: CPT | Performed by: INTERNAL MEDICINE

## 2021-03-08 PROCEDURE — 2500000003 HC RX 250 WO HCPCS: Performed by: INTERNAL MEDICINE

## 2021-03-08 PROCEDURE — 2580000003 HC RX 258: Performed by: INTERNAL MEDICINE

## 2021-03-08 PROCEDURE — 2709999900 HC NON-CHARGEABLE SUPPLY

## 2021-03-08 PROCEDURE — 85027 COMPLETE CBC AUTOMATED: CPT

## 2021-03-08 PROCEDURE — 99153 MOD SED SAME PHYS/QHP EA: CPT

## 2021-03-08 PROCEDURE — C1894 INTRO/SHEATH, NON-LASER: HCPCS

## 2021-03-08 PROCEDURE — 80048 BASIC METABOLIC PNL TOTAL CA: CPT

## 2021-03-08 RX ORDER — IODIXANOL 320 MG/ML
80 INJECTION, SOLUTION INTRAVASCULAR
Status: COMPLETED | OUTPATIENT
Start: 2021-03-08 | End: 2021-03-08

## 2021-03-08 RX ORDER — SODIUM CHLORIDE 9 MG/ML
INJECTION, SOLUTION INTRAVENOUS CONTINUOUS
Status: DISCONTINUED | OUTPATIENT
Start: 2021-03-08 | End: 2021-03-08 | Stop reason: HOSPADM

## 2021-03-08 RX ORDER — SODIUM CHLORIDE 0.9 % (FLUSH) 0.9 %
10 SYRINGE (ML) INJECTION EVERY 12 HOURS SCHEDULED
Status: DISCONTINUED | OUTPATIENT
Start: 2021-03-08 | End: 2021-03-08

## 2021-03-08 RX ORDER — SODIUM CHLORIDE 0.9 % (FLUSH) 0.9 %
10 SYRINGE (ML) INJECTION EVERY 12 HOURS SCHEDULED
Status: DISCONTINUED | OUTPATIENT
Start: 2021-03-08 | End: 2021-03-08 | Stop reason: SDUPTHER

## 2021-03-08 RX ORDER — ACETAMINOPHEN 325 MG/1
650 TABLET ORAL EVERY 4 HOURS PRN
Status: DISCONTINUED | OUTPATIENT
Start: 2021-03-08 | End: 2021-03-08 | Stop reason: HOSPADM

## 2021-03-08 RX ORDER — ASPIRIN 325 MG
325 TABLET ORAL ONCE
Status: DISCONTINUED | OUTPATIENT
Start: 2021-03-08 | End: 2021-03-08 | Stop reason: HOSPADM

## 2021-03-08 RX ORDER — ONDANSETRON 2 MG/ML
4 INJECTION INTRAMUSCULAR; INTRAVENOUS EVERY 6 HOURS PRN
Status: DISCONTINUED | OUTPATIENT
Start: 2021-03-08 | End: 2021-03-08 | Stop reason: SDUPTHER

## 2021-03-08 RX ORDER — NITROGLYCERIN 0.4 MG/1
0.4 TABLET SUBLINGUAL EVERY 5 MIN PRN
Status: DISCONTINUED | OUTPATIENT
Start: 2021-03-08 | End: 2021-03-08 | Stop reason: HOSPADM

## 2021-03-08 RX ORDER — SODIUM CHLORIDE 0.9 % (FLUSH) 0.9 %
10 SYRINGE (ML) INJECTION PRN
Status: DISCONTINUED | OUTPATIENT
Start: 2021-03-08 | End: 2021-03-08 | Stop reason: HOSPADM

## 2021-03-08 RX ORDER — SODIUM CHLORIDE 0.9 % (FLUSH) 0.9 %
10 SYRINGE (ML) INJECTION PRN
Status: DISCONTINUED | OUTPATIENT
Start: 2021-03-08 | End: 2021-03-08 | Stop reason: SDUPTHER

## 2021-03-08 RX ORDER — ONDANSETRON 2 MG/ML
4 INJECTION INTRAMUSCULAR; INTRAVENOUS EVERY 6 HOURS PRN
Status: DISCONTINUED | OUTPATIENT
Start: 2021-03-08 | End: 2021-03-08 | Stop reason: HOSPADM

## 2021-03-08 RX ADMIN — SODIUM BICARBONATE: 84 INJECTION, SOLUTION INTRAVENOUS at 09:12

## 2021-03-08 RX ADMIN — IODIXANOL 80 ML: 320 INJECTION, SOLUTION INTRAVASCULAR at 12:30

## 2021-03-08 RX ADMIN — SODIUM CHLORIDE: 9 INJECTION, SOLUTION INTRAVENOUS at 13:33

## 2021-03-08 NOTE — PROGRESS NOTES
Patient ambulated to bathroom and in hallway without difficulty. Discharge instructions given. Patient and spouse voiced understanding.

## 2021-03-08 NOTE — PROGRESS NOTES
Cardiac catheterization pulmonary note:    RCA is a small vessel and is occluded in the midportion within the stent. LAD proximal 95% stenosis. Patent LIMA to LAD. Circumflex is a large vessel with proximal 55% stenosis. Medical management at this time. If any symptoms should occur patient advised to seek medical attention immediately.

## 2021-04-06 ENCOUNTER — OFFICE VISIT (OUTPATIENT)
Dept: CARDIOLOGY CLINIC | Age: 76
End: 2021-04-06
Payer: MEDICARE

## 2021-04-06 VITALS
DIASTOLIC BLOOD PRESSURE: 86 MMHG | BODY MASS INDEX: 35.42 KG/M2 | WEIGHT: 253 LBS | OXYGEN SATURATION: 97 % | HEIGHT: 71 IN | SYSTOLIC BLOOD PRESSURE: 140 MMHG | HEART RATE: 83 BPM

## 2021-04-06 DIAGNOSIS — I25.5 ISCHEMIC CARDIOMYOPATHY: ICD-10-CM

## 2021-04-06 DIAGNOSIS — I10 ESSENTIAL HYPERTENSION: ICD-10-CM

## 2021-04-06 DIAGNOSIS — E78.2 MIXED HYPERLIPIDEMIA: ICD-10-CM

## 2021-04-06 DIAGNOSIS — I25.10 CORONARY ARTERY DISEASE INVOLVING NATIVE CORONARY ARTERY OF NATIVE HEART WITHOUT ANGINA PECTORIS: Primary | ICD-10-CM

## 2021-04-06 DIAGNOSIS — I50.42 CHRONIC COMBINED SYSTOLIC AND DIASTOLIC HEART FAILURE (HCC): ICD-10-CM

## 2021-04-06 PROBLEM — R07.9 CHEST PAIN: Status: RESOLVED | Noted: 2018-07-10 | Resolved: 2021-04-06

## 2021-04-06 PROCEDURE — G8427 DOCREV CUR MEDS BY ELIG CLIN: HCPCS | Performed by: CLINICAL NURSE SPECIALIST

## 2021-04-06 PROCEDURE — 4040F PNEUMOC VAC/ADMIN/RCVD: CPT | Performed by: CLINICAL NURSE SPECIALIST

## 2021-04-06 PROCEDURE — 1123F ACP DISCUSS/DSCN MKR DOCD: CPT | Performed by: CLINICAL NURSE SPECIALIST

## 2021-04-06 PROCEDURE — 1036F TOBACCO NON-USER: CPT | Performed by: CLINICAL NURSE SPECIALIST

## 2021-04-06 PROCEDURE — 99214 OFFICE O/P EST MOD 30 MIN: CPT | Performed by: CLINICAL NURSE SPECIALIST

## 2021-04-06 PROCEDURE — G8417 CALC BMI ABV UP PARAM F/U: HCPCS | Performed by: CLINICAL NURSE SPECIALIST

## 2021-04-06 ASSESSMENT — ENCOUNTER SYMPTOMS
CHEST TIGHTNESS: 0
WHEEZING: 0
VOMITING: 0
NAUSEA: 0
FACIAL SWELLING: 0
COUGH: 0
ABDOMINAL PAIN: 0
EYE REDNESS: 0
SHORTNESS OF BREATH: 0

## 2021-04-06 NOTE — PROGRESS NOTES
[facility], [address] [address] [address] [facility]en [employer], [address] [address]  Phone: [phone]  Fax: [phone]    OFFICE VISIT:  2021    Corina Cowan - : 1945    Reason For Visit:  Daphney Valdes is a 66 y.o. male who is here for Follow-up (Patient is here for follow up from cath. States he is doing well. ) and Coronary Artery Disease       Diagnosis Orders   1. Coronary artery disease involving native coronary artery of native heart without angina pectoris     2. Chronic combined systolic and diastolic heart failure ([address]     3. Mixed hyperlipidemia  ALT    AST    Lipid Panel   4. Ischemic cardiomyopathy     5. Essential hypertension           HPI  Patient is here for follow-up with a history of CAD, chronic systolic and diastolic heart failure, hypertension, hyperlipidemia. He had a recent heart cath on 3/8/2021 and is here for hospital follow-up. Patient was found to have occluded small vessel RCA and intermediate proximal circumflex disease and an occluded stent in the mid RCA. Decrease in ejection fraction is what led to the heart cath. Patient reports he is having no chest discomfort, unusual dyspnea, orthopnea, PND, edema or sudden weight gain. Patient states he did not feel he was \" not put to sleep well enough\" for his heart cath and knew everything that was going on. Verito Sebastian DO is PCP.   Corina Cowan has the following history as recorded in [facility]:    Patient Active Problem List    Diagnosis Date Noted    Ischemic cardiomyopathy      Priority: High    Hx of CABG 07/10/2018    Open wound of right foot 2017    Surgical wound dehiscence 10/17/2017    Class 2 obesity in adult 10/17/2017    Wound infection after surgery 10/17/2017    Open displaced fracture of fifth metatarsal bone 2017    CAD (coronary artery disease)     Sleep apnea     Mixed hyperlipidemia     HTN (hypertension)      Past Medical History:   Diagnosis Date    CAD (coronary artery disease)     CABG    Cervical spinal stenosis     Claustrophobia     Depression     Diverticulitis     HTN (hypertension)     Hyperlipidemia     Dr. Gunderson Adjutant manages    [facility]     MI, old     Sleep apnea     wear CPAP     Past Surgical History:   Procedure Laterality Date    CARDIAC CATHETERIZATION  2011    Chronic occusions med rx for now   [address] GRAFT  2003    In  [address] Right     [address] / ANKLE Right 2017    HARDWARE REMOVAL AND REVISION ORIF 5TH METATARSAL performed by Alyssa Nicole DO at [facility] HARDWARE FROM TOE Right 11/3/2017    INCISION AND DRAINAGE REMOVAL INFECTED HARDWARE RIGHT FOOT performed by Alyssa Nicole DO at [facility]     History reviewed. No pertinent family history. Social History     Tobacco Use    Smoking status: Former Smoker     Quit date: 2002     Years since quittin.3    Smokeless tobacco: Never Used   Substance Use Topics    Alcohol use: No      Current Outpatient Medications   Medication Sig Dispense Refill    atorvastatin (LIPITOR) 80 MG tablet Take 80 mg by mouth daily      amLODIPine (NORVASC) 2.5 MG tablet Take 2.5 mg by mouth daily      lisinopril (PRINIVIL;ZESTRIL) 20 MG tablet Take 10 mg by mouth daily       ranolazine (RANEXA) 500 MG extended release tablet Take 1,000 mg by mouth 2 times daily      omeprazole (PRILOSEC) 20 MG capsule Take 20 mg by mouth daily.  metoprolol (LOPRESSOR) 50 MG tablet Take 75 mg by mouth 2 times daily One half tablet twice daily.  dipyridamole (PERSANTINE) 75 MG tablet Take 75 mg by mouth 3 times daily.  Omega-3 Fatty Acids (FISH OIL) 1000 MG CAPS Take 1,000 mg by mouth 2 times daily.  aspirin 325 MG tablet Take 325 mg by mouth daily.       Coenzyme Q10 (CO Q 10) 10 MG CAPS Take 1 capsule by mouth daily No current facility-administered medications for this visit. Allergies: Crestor [rosuvastatin]    Review of Systems  Review of Systems   Constitutional: Positive for fatigue. Negative for activity change, diaphoresis, fever and unexpected weight change. HENT: Negative for facial swelling and nosebleeds. Eyes: Negative for redness and visual disturbance. Respiratory: Negative for cough, chest tightness, shortness of breath and wheezing. Cardiovascular: Negative for chest pain, palpitations and leg swelling. Gastrointestinal: Negative for abdominal pain, nausea and vomiting. Endocrine: Negative for cold intolerance and heat intolerance. Genitourinary: Negative for dysuria and hematuria. Musculoskeletal: Negative for arthralgias and myalgias. Skin: Negative for pallor and rash. Neurological: Negative for dizziness (occasional with postion change), seizures, syncope, weakness and light-headedness. Hematological: Does not bruise/bleed easily. Psychiatric/Behavioral: Negative for agitation. The patient is not nervous/anxious. Objective  Vital Signs - BP (!) 140/86   Pulse 83   Ht 5' 11\" (1.803 m)   Wt 253 lb (114.8 kg)   SpO2 97%   BMI 35.29 kg/m²   Physical Exam  Vitals signs and nursing note reviewed. Constitutional:       General: He is not in acute distress. Appearance: He is well-developed. He is not diaphoretic. Comments: Elderly   HENT:      Head: Normocephalic and atraumatic. Right Ear: Hearing and external ear normal.      Left Ear: Hearing and external ear normal.      Nose: Nose normal.   Eyes:      General:         Right eye: No discharge. Left eye: No discharge. Pupils: Pupils are equal, round, and reactive to light. Neck:      Musculoskeletal: Neck supple. No muscular tenderness. Thyroid: No thyromegaly. Vascular: No carotid bruit or JVD. Trachea: No tracheal deviation.    Cardiovascular:      Rate and Rhythm: Essential hypertension         Hospital records reviewed    CADstatus post recent heart cath with medical treatment recommended. No angina symptoms. Ischemic cardiomyopathy/ chronic combined systolic and diastolic heart failure NYHA class II, stage C, EF 40 to 45%patient appears euvolemic on guideline directed medical therapy. Counseled on daily weights, reporting weight gain of 3lbs in 24hrs or 5lbs in a week, low sodium diet, and fluid restrictions 6 cupsor 48oz daily. Hypertensionfair control on current regimen. Continue to monitor    HyperlipidemiaLipitor was increased during hospitalization. Check fasting lipid profile, AST, ALT to reassess    Stable cardiovascular status. No evidence of overt heart failure,angina or dysrhythmia. Plan    Orders Placed This Encounter   Procedures    ALT     Standing Status:   Future     Standing Expiration Date:   4/6/2022    AST     Standing Status:   Future     Standing Expiration Date:   4/6/2022    Lipid Panel     Standing Status:   Future     Standing Expiration Date:   4/6/2022     Order Specific Question:   Is Patient Fasting?/# of Hours     Answer:   yes     Return in about 6 months (around 10/6/2021) for APRN. Check cholesterol fasting    - Weigh daily and report weight gain of 3lbs or more in 24hrs or 5lbs in one week. - Call for increasing shortness of breath or increasing swelling in feet and legs. (This could mean you are retaining too much fluid)  - 2000mg low sodium diet  - Fluid restriction of 1500ml per day (about 6 cups of fluid per day)    Call with any questionsor concerns  Follow up with Julienne Castillo, DO for non cardiac problems  Report any new problems  Cardiovascular Fitness-Exercise as tolerated. Strive for 15 minutes of exercise most days of the week.     Cardiac / HealthyDiet  Continue current medications as directed  Continue plan of treatment  It is always recommended that you bring your medicationsbottles with you to each visit - this is for your safety!        Chyna Quintanillautant, ALLAN

## 2021-04-15 DIAGNOSIS — E78.2 MIXED HYPERLIPIDEMIA: ICD-10-CM

## 2021-04-15 LAB
ALT SERPL-CCNC: 19 U/L (ref 5–41)
AST SERPL-CCNC: 19 U/L (ref 5–40)
CHOLESTEROL, TOTAL: 143 MG/DL (ref 160–199)
HDLC SERPL-MCNC: 32 MG/DL (ref 55–121)
LDL CHOLESTEROL CALCULATED: 61 MG/DL
TRIGL SERPL-MCNC: 252 MG/DL (ref 0–149)

## 2021-08-17 ENCOUNTER — OFFICE VISIT (OUTPATIENT)
Dept: CARDIOLOGY CLINIC | Age: 76
End: 2021-08-17
Payer: MEDICARE

## 2021-08-17 VITALS
WEIGHT: 247 LBS | HEIGHT: 71 IN | BODY MASS INDEX: 34.58 KG/M2 | SYSTOLIC BLOOD PRESSURE: 134 MMHG | DIASTOLIC BLOOD PRESSURE: 86 MMHG | HEART RATE: 71 BPM

## 2021-08-17 DIAGNOSIS — I25.10 CORONARY ARTERY DISEASE INVOLVING NATIVE CORONARY ARTERY OF NATIVE HEART WITHOUT ANGINA PECTORIS: ICD-10-CM

## 2021-08-17 DIAGNOSIS — I10 ESSENTIAL HYPERTENSION: Primary | ICD-10-CM

## 2021-08-17 PROCEDURE — 1123F ACP DISCUSS/DSCN MKR DOCD: CPT | Performed by: INTERNAL MEDICINE

## 2021-08-17 PROCEDURE — 93000 ELECTROCARDIOGRAM COMPLETE: CPT | Performed by: INTERNAL MEDICINE

## 2021-08-17 PROCEDURE — 99214 OFFICE O/P EST MOD 30 MIN: CPT | Performed by: INTERNAL MEDICINE

## 2021-08-17 PROCEDURE — 1036F TOBACCO NON-USER: CPT | Performed by: INTERNAL MEDICINE

## 2021-08-17 PROCEDURE — G8417 CALC BMI ABV UP PARAM F/U: HCPCS | Performed by: INTERNAL MEDICINE

## 2021-08-17 PROCEDURE — 4040F PNEUMOC VAC/ADMIN/RCVD: CPT | Performed by: INTERNAL MEDICINE

## 2021-08-17 PROCEDURE — G8427 DOCREV CUR MEDS BY ELIG CLIN: HCPCS | Performed by: INTERNAL MEDICINE

## 2021-08-17 RX ORDER — CARVEDILOL 3.12 MG/1
3.12 TABLET ORAL 2 TIMES DAILY WITH MEALS
Qty: 90 TABLET | Refills: 3 | OUTPATIENT
Start: 2021-08-17 | End: 2021-09-28

## 2021-08-17 NOTE — PROGRESS NOTES
HISTORY  13-year-old gentleman with a history of dyslipidemia, past tobacco abuse, hypertension, obstructive sleep apnea, coronary disease and left ventricular dysfunction returns for a follow-up visit. 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 Stress testing obtained in July 2019 revealed an EF of 46% without ischemia. More recently had an echocardiogram in January of this year with an EF estimated to be 40 to 45% with accompanying grade 2 diastolic dysfunction and moderate LVH. In March of this year he underwent angiographic reassessment with an inadequate ventriculogram but coronaries assessment revealing a severe LAD stenosis with a patent LIMA, and occluded RCA stent, and a 50% proximal circumflex and a 55% OM1 lesion. His lipids were checked in April with an LDL of 61, HDL 32, triglycerides 252. Return today he relates no chest discomfort with no change in his dyspnea on exertion. He is active but does not exercise on a regular basis. He has been vaccinated for COVID-19. PHYSICAL EXAM  On exam carries 247 pounds on a 5 foot 11 inch frame. Pressure is 134/86 pulse of 71. Very pleasant endomorphic gentleman. EOMs full, sclerae and conjunctiva normal. PERRLA. Mask in place. Trachea midline with no neck masses. Assessment of internal jugular veins reveals no elevation of central venous pressure at 45 degrees. Carotid pulses normal without delay or bruit. Thyroid normal to palpation. Healed midline sternotomy scar. Chest exam reveals normal respiratory effort, no abnormal breath sounds and normal expiratory phase. No skin lesions seen. PMI normal. S1, S2 normal with a soft gallop rhythm. Obese abdomen. Normal bowel sounds without palpable mass or bruit. No clubbing or acrocyanosis. No significant lower extremity edema or signs of venous insufficiency. General motor strength appears to be within normal limits.  Normal range of motion with normal gait. Alert, oriented x 3, memory and cognition normal as reflected by history and conversation. EKG reveals a sinus rhythm with an intraventricular conduction delay, inferior Q waves consistent with prior inferior infarction, and diffuse ST-T wave abnormalities. ASSESSMENT/PLAN:   1. Left ventricular dysfunction -I reviewed his echocardiogram which demonstrates LV function better than the inadequate injection of the LV at the time of cath. His ejection fraction appears to be in the 40 to 45% range. With a creatinine of 1.4, Entresto therapy might be problematic. We will start him on carvedilol 3.125 mg twice daily acknowledging the fact that he is already on low-dose beta-blocker therapy. Continue lisinopril  2. Coronary disease -50 and 55% circumflex and obtuse marginal lesions as above. He will deserve follow-up Lexiscan stress testing as he already has significant left ventricular dysfunction and would not well-tolerated any additional myocardial loss. Continue Ranexa,  and aspirin  3. Hypertension -room for improvement. Will titrate carvedilol before make any other changes. Continue amlodipine, lisinopril, and beta-blocker  4. Dyslipidemia -LDL well controlled continue atorvastatin  5.   Pandemic response -appropriate/vaccinated

## 2021-09-28 ENCOUNTER — OFFICE VISIT (OUTPATIENT)
Dept: CARDIOLOGY CLINIC | Age: 76
End: 2021-09-28
Payer: MEDICARE

## 2021-09-28 VITALS
SYSTOLIC BLOOD PRESSURE: 114 MMHG | HEART RATE: 60 BPM | DIASTOLIC BLOOD PRESSURE: 70 MMHG | BODY MASS INDEX: 34.86 KG/M2 | HEIGHT: 71 IN | WEIGHT: 249 LBS | OXYGEN SATURATION: 96 %

## 2021-09-28 DIAGNOSIS — I25.5 ISCHEMIC CARDIOMYOPATHY: ICD-10-CM

## 2021-09-28 DIAGNOSIS — I50.42 CHRONIC COMBINED SYSTOLIC AND DIASTOLIC HEART FAILURE (HCC): ICD-10-CM

## 2021-09-28 DIAGNOSIS — E78.2 MIXED HYPERLIPIDEMIA: ICD-10-CM

## 2021-09-28 DIAGNOSIS — I10 ESSENTIAL HYPERTENSION: ICD-10-CM

## 2021-09-28 DIAGNOSIS — I25.10 CORONARY ARTERY DISEASE INVOLVING NATIVE CORONARY ARTERY OF NATIVE HEART WITHOUT ANGINA PECTORIS: Primary | ICD-10-CM

## 2021-09-28 PROCEDURE — 1036F TOBACCO NON-USER: CPT | Performed by: CLINICAL NURSE SPECIALIST

## 2021-09-28 PROCEDURE — G8417 CALC BMI ABV UP PARAM F/U: HCPCS | Performed by: CLINICAL NURSE SPECIALIST

## 2021-09-28 PROCEDURE — G8427 DOCREV CUR MEDS BY ELIG CLIN: HCPCS | Performed by: CLINICAL NURSE SPECIALIST

## 2021-09-28 PROCEDURE — 1123F ACP DISCUSS/DSCN MKR DOCD: CPT | Performed by: CLINICAL NURSE SPECIALIST

## 2021-09-28 PROCEDURE — 99214 OFFICE O/P EST MOD 30 MIN: CPT | Performed by: CLINICAL NURSE SPECIALIST

## 2021-09-28 PROCEDURE — 4040F PNEUMOC VAC/ADMIN/RCVD: CPT | Performed by: CLINICAL NURSE SPECIALIST

## 2021-09-28 RX ORDER — CARVEDILOL 6.25 MG/1
6.25 TABLET ORAL 2 TIMES DAILY WITH MEALS
Qty: 180 TABLET | Refills: 3 | Status: SHIPPED | OUTPATIENT
Start: 2021-09-28 | End: 2022-04-28

## 2021-09-28 ASSESSMENT — ENCOUNTER SYMPTOMS
FACIAL SWELLING: 0
CHEST TIGHTNESS: 0
WHEEZING: 0
SHORTNESS OF BREATH: 0
COUGH: 0
NAUSEA: 0
ABDOMINAL PAIN: 0
VOMITING: 0
EYE REDNESS: 0

## 2021-09-28 NOTE — PROGRESS NOTES
[facility], [address], [address] [address]  Phone: [phone]  Fax: [phone]    OFFICE VISIT:  2021    Baylee Whitlock - : 1945    Reason For Visit:  Keli Godoy is a 66 y.o. male who is here for Follow-up (patient here for follow up new hypertension medication), Coronary Artery Disease, and Hypertension       Diagnosis Orders   1. Coronary artery disease involving native coronary artery of native heart without angina pectoris     2. Chronic combined systolic and diastolic heart failure ([address]     3. Ischemic cardiomyopathy     4. Essential hypertension  carvedilol (COREG) 6.25 MG tablet   5. Mixed hyperlipidemia           HPI  Patient is here for follow-up with a history of CAD, chronic systolic and diastolic heart failure, hypertension, hyperlipidemia, untreated sleep apnea. He had a  heart cath on 3/8/2021 and  was found to have occluded small vessel RCA and intermediate proximal circumflex disease and an occluded stent in the mid RCA. At last visit, Dr. Jean Blair change patient to guideline directed medical therapy with carvedilol, however patient did not realize he should discontinue his metoprolol tartrate. Seems to be tolerating the medication change well    No signs of fluid overload such as sudden weight gain, orthopnea, PND, edema. He denies chest pain or palpitations    Latasha Foster DO is PCP.   Baylee Whitlock has the following history as recorded in [facility]:    Patient Active Problem List    Diagnosis Date Noted    Ischemic cardiomyopathy     Hx of CABG 07/10/2018    Open wound of right foot 2017    Surgical wound dehiscence 10/17/2017    Class 2 obesity in adult 10/17/2017    Wound infection after surgery 10/17/2017    Open displaced fracture of fifth metatarsal bone 2017    CAD (coronary artery disease)     Sleep apnea     Mixed hyperlipidemia     HTN (hypertension)      Past Medical History:   Diagnosis Date    CAD (coronary artery disease) 2003    CABG    Cervical spinal stenosis     Claustrophobia     Depression     Diverticulitis     HTN (hypertension)     Hyperlipidemia     Dr. Cherri Serrato manages    [facility]     MI, old     Sleep apnea     wear CPAP     Past Surgical History:   Procedure Laterality Date    CARDIAC CATHETERIZATION  2011    Chronic occusions med rx for now   [address] GRAFT  2003    In  [address]     [address] / ANKLE Right 2017    HARDWARE REMOVAL AND REVISION ORIF 5TH METATARSAL performed by Aubree Jade DO at [facility] HARDWARE FROM TOE Right 11/3/2017    INCISION AND DRAINAGE REMOVAL INFECTED HARDWARE RIGHT FOOT performed by Aubree Jade DO at [address] [place] [address]     History reviewed. No pertinent family history. Social History     Tobacco Use    Smoking status: Former Smoker     Quit date: 2002     Years since quittin.8    Smokeless tobacco: Never Used   Substance Use Topics    Alcohol use: No      Current Outpatient Medications   Medication Sig Dispense Refill    carvedilol (COREG) 6.25 MG tablet Take 1 tablet by mouth 2 times daily (with meals) 180 tablet 3    atorvastatin (LIPITOR) 80 MG tablet Take 80 mg by mouth daily      amLODIPine (NORVASC) 2.5 MG tablet Take 2.5 mg by mouth daily      lisinopril (PRINIVIL;ZESTRIL) 20 MG tablet Take 10 mg by mouth daily       ranolazine (RANEXA) 500 MG extended release tablet Take 1,000 mg by mouth 2 times daily      omeprazole (PRILOSEC) 20 MG capsule Take 20 mg by mouth daily.  dipyridamole (PERSANTINE) 75 MG tablet Take 75 mg by mouth 3 times daily.  Omega-3 Fatty Acids (FISH OIL) 1000 MG CAPS Take 1,000 mg by mouth 2 times daily.  aspirin 325 MG tablet Take 325 mg by mouth daily.       Coenzyme Q10 (CO Q 10) 10 MG CAPS Take 1 capsule by mouth daily        No current facility-administered medications for this visit. Allergies: Crestor [rosuvastatin]    Review of Systems  Review of Systems   Constitutional: Positive for fatigue. Negative for activity change, diaphoresis, fever and unexpected weight change. HENT: Negative for facial swelling and nosebleeds. Eyes: Negative for redness and visual disturbance. Respiratory: Negative for cough, chest tightness, shortness of breath and wheezing. Cardiovascular: Negative for chest pain, palpitations and leg swelling. Gastrointestinal: Negative for abdominal pain, nausea and vomiting. Endocrine: Negative for cold intolerance and heat intolerance. Genitourinary: Negative for dysuria and hematuria. Musculoskeletal: Negative for arthralgias and myalgias. Skin: Negative for pallor and rash. Neurological: Negative for dizziness (occasional with postion change), seizures, syncope, weakness and light-headedness. Hematological: Does not bruise/bleed easily. Psychiatric/Behavioral: Negative for agitation. The patient is not nervous/anxious. Objective  Vital Signs - /70   Pulse 60   Ht 5' 11\" (1.803 m)   Wt 249 lb (112.9 kg)   SpO2 96%   BMI 34.73 kg/m²    Wt Readings from Last 3 Encounters:   09/28/21 249 lb (112.9 kg)   08/17/21 247 lb (112 kg)   04/06/21 253 lb (114.8 kg)      Physical Exam  Vitals and nursing note reviewed. Constitutional:       General: He is not in acute distress. Appearance: He is well-developed. He is not diaphoretic. Comments: Elderly   HENT:      Head: Normocephalic and atraumatic. Right Ear: Hearing and external ear normal.      Left Ear: Hearing and external ear normal.      Nose: Nose normal.   Eyes:      General:         Right eye: No discharge. Left eye: No discharge. Pupils: Pupils are equal, round, and reactive to light. Neck:      Thyroid: No thyromegaly. Vascular: No carotid bruit or JVD.       Trachea: No 2. Chronic combined systolic and diastolic heart failure ([address]     3. Ischemic cardiomyopathy     4. Essential hypertension  carvedilol (COREG) 6.25 MG tablet   5. Mixed hyperlipidemia           CAD-status post recent heart cath 3/8/2021 with medical treatment recommended. No angina symptoms. Ischemic cardiomyopathy/ chronic combined systolic and diastolic heart failure NYHA class II, stage C, EF 40 to 45%-patient appears euvolemic. Increase carvedilol to 6.25 mg twice a day and stop metoprolol. Counseled on daily weights, reporting weight gain of 3lbs in 24hrs or 5lbs in a week, low sodium diet, and fluid restrictions 6 cupsor 48oz daily. Hypertension-stable on current regimen with amlodipine, carvedilol, lisinopril    Hyperlipidemia-stable on atorvastatin. Triglycerides are elevated. We discussed decreasing sugars and starches in the diet    Stable cardiovascular status. No evidence of overt heart failure,angina or dysrhythmia. Plan      Return in about 6 months (around 3/28/2022) for APRN. Stop Metoprolol  Increase Carvedilol to 6.25mg twice a day    - Weigh daily and report weight gain of 3lbs or more in 24hrs or 5lbs in one week. - Call for increasing shortness of breath or increasing swelling in feet and legs. (This could mean you are retaining too much fluid)  - 2000mg low sodium diet  - Fluid restriction of 1500ml per day (about 6 cups of fluid per day)    Call with any questionsor concerns  Follow [facility] with [facility], DO for non cardiac problems  Report any new problems  Cardiovascular Fitness-Exercise as tolerated. Strive for 15 minutes of exercise most days of the week. Cardiac / HealthyDiet  Continue current medications as directed  Continue plan of treatment  It is always recommended that you bring your medicationsbottles with you to each visit - this is for your safety!        ALLAN Brown

## 2021-09-28 NOTE — PATIENT INSTRUCTIONS
not take any vitamins, over-the-counter medicine, or herbal products without talking to your doctor first. Saqib Lema not take ibuprofen (Advil or Motrin) and naproxen (Aleve) without talking to your doctor first. They could make your heart failure worse.     · You may take some of the following medicine. ? Angiotensin-converting enzyme inhibitors (ACEIs) or angiotensin II receptor blockers (ARBs) reduce the heart's workload, lower blood pressure, and reduce swelling. Taking an ACEI or ARB may lower your chance of needing to be hospitalized. ? Beta-blockers can slow heart rate, decrease blood pressure, and improve your condition. Taking a beta-blocker may lower your chance of needing to be hospitalized. ? Diuretics, also called water pills, reduce swelling. You will get more details on the specific medicines your doctor prescribes. Diet    · Your doctor may suggest that you limit sodium. Your doctor can tell you how much sodium is right for you. An example is less than 3,000 mg a day. This includes all the salt you eat in cooking or in packaged foods. People get most of their sodium from processed foods. Fast food and restaurant meals also tend to be very high in sodium.     · Ask your doctor how much liquid you can drink each day. You may have to limit liquids. Weight    · Weigh yourself without clothing at the same time each day. Record your weight. Call your doctor if you have a sudden weight gain, such as more than 2 to 3 pounds in a day or 5 pounds in a week. (Your doctor may suggest a different range of weight gain.) A sudden weight gain may mean that your heart failure is getting worse. Activity level    · Start light exercise (if your doctor says it is okay). Even if you can only do a small amount, exercise will help you get stronger, have more energy, and manage your weight and your stress. Walking is an easy way to get exercise. Start out by walking a little more than you did before.  Bit by bit, increase the amount you walk.     · When you exercise, watch for signs that your heart is working too hard. You are pushing yourself too hard if you cannot talk while you are exercising. If you become short of breath or dizzy or have chest pain, stop, sit down, and rest.     · If you feel \"wiped out\" the day after you exercise, walk slower or for a shorter distance until you can work up to a better pace.     · Get enough rest at night. Sleeping with 1 or 2 pillows under your upper body and head may help you breathe easier. Lifestyle changes    · Do not smoke. Smoking can make a heart condition worse. If you need help quitting, talk to your doctor about stop-smoking programs and medicines. These can increase your chances of quitting for good. Quitting smoking may be the most important step you can take to protect your heart.     · Limit alcohol to 2 drinks a day for men and 1 drink a day for women. Too much alcohol can cause health problems.     · Avoid getting sick from colds and the flu. Get a pneumococcal vaccine shot. If you have had one before, ask your doctor whether you need another dose. Get a flu shot each year. If you must be around people with colds or the flu, wash your hands often. When should you call for help? Call 911  if you have symptoms of sudden heart failure such as:    · You have severe trouble breathing.     · You cough up pink, foamy mucus.     · You have a new irregular or rapid heartbeat. Call your doctor now or seek immediate medical care if:    · You have new or increased shortness of breath.     · You are dizzy or lightheaded, or you feel like you may faint.     · You have sudden weight gain, such as more than 2 to 3 pounds in a day or 5 pounds in a week. (Your doctor may suggest a different range of weight gain.)     · You have increased swelling in your legs, ankles, or feet.     · You are suddenly so tired or weak that you cannot do your usual activities.    Watch closely for changes in your health, and be sure to contact your doctor if you develop new symptoms. Where can you learn more? Go to https://chpepiceweb.[employer]. org and sign in to your [employer] account. Enter D070 in the [employer] box to learn more about \"Heart Failure: Care Instructions. \"     If you do not have an account, please click on the \"Sign Up Now\" link. Current as of: April 29, 2021               Content Version: 13.0  © 1384-5263 [employer]. Care instructions adapted under license by [facility] ([facility]). If you have questions about a medical condition or this instruction, always ask your healthcare professional. [address] any warranty or liability for your use of this information.

## 2022-03-29 ENCOUNTER — OFFICE VISIT (OUTPATIENT)
Dept: CARDIOLOGY CLINIC | Age: 77
End: 2022-03-29
Payer: MEDICARE

## 2022-03-29 VITALS
HEART RATE: 70 BPM | DIASTOLIC BLOOD PRESSURE: 78 MMHG | WEIGHT: 242 LBS | OXYGEN SATURATION: 98 % | SYSTOLIC BLOOD PRESSURE: 134 MMHG | BODY MASS INDEX: 33.88 KG/M2 | HEIGHT: 71 IN

## 2022-03-29 DIAGNOSIS — I25.5 ISCHEMIC CARDIOMYOPATHY: ICD-10-CM

## 2022-03-29 DIAGNOSIS — I25.10 CORONARY ARTERY DISEASE INVOLVING NATIVE CORONARY ARTERY OF NATIVE HEART WITHOUT ANGINA PECTORIS: Primary | ICD-10-CM

## 2022-03-29 DIAGNOSIS — Z86.16 HISTORY OF COVID-19: ICD-10-CM

## 2022-03-29 DIAGNOSIS — I10 PRIMARY HYPERTENSION: ICD-10-CM

## 2022-03-29 DIAGNOSIS — E78.2 MIXED HYPERLIPIDEMIA: ICD-10-CM

## 2022-03-29 PROCEDURE — 1123F ACP DISCUSS/DSCN MKR DOCD: CPT | Performed by: CLINICAL NURSE SPECIALIST

## 2022-03-29 PROCEDURE — 4040F PNEUMOC VAC/ADMIN/RCVD: CPT | Performed by: CLINICAL NURSE SPECIALIST

## 2022-03-29 PROCEDURE — 1036F TOBACCO NON-USER: CPT | Performed by: CLINICAL NURSE SPECIALIST

## 2022-03-29 PROCEDURE — G8417 CALC BMI ABV UP PARAM F/U: HCPCS | Performed by: CLINICAL NURSE SPECIALIST

## 2022-03-29 PROCEDURE — 99214 OFFICE O/P EST MOD 30 MIN: CPT | Performed by: CLINICAL NURSE SPECIALIST

## 2022-03-29 PROCEDURE — G8484 FLU IMMUNIZE NO ADMIN: HCPCS | Performed by: CLINICAL NURSE SPECIALIST

## 2022-03-29 PROCEDURE — G8427 DOCREV CUR MEDS BY ELIG CLIN: HCPCS | Performed by: CLINICAL NURSE SPECIALIST

## 2022-03-29 ASSESSMENT — ENCOUNTER SYMPTOMS
EYE REDNESS: 0
WHEEZING: 0
NAUSEA: 0
SHORTNESS OF BREATH: 0
CHEST TIGHTNESS: 0
COUGH: 0
ABDOMINAL PAIN: 0
FACIAL SWELLING: 0
VOMITING: 0

## 2022-03-29 NOTE — PATIENT INSTRUCTIONS
[place] at the [facility] and [address] located on the first floor of [place] [address] through hospital main entrance and turn immediately to your left. Date/Time:     Patient's contact number:  [phone] (home)     Echocardiogram -  No prep. Takes approximately 30 min. An echocardiogram uses sound waves to produce images of your heart. This commonly used test allows your doctor to see how your heart is beating and pumping blood. Your doctor can use the images from an echocardiogram to identify various abnormalities in the heart muscle and valves. This test has 2 parts:   Ø You will be asked to disrobe from the waist up and given a gown to wear. The technologist will then hook up an EKG monitor to you for the entire exam.   Ø You will then have an ultrasound of your heart (echocardiogram) to assess the heart muscle, heart valves and heart function. You may eat and take any medicines before the exam.     If you need to change your appointment, please call outpatient scheduling at [phone].

## 2022-03-29 NOTE — PROGRESS NOTES
[facility], [address] [place] [address], [address] [address]  Phone: [phone]  Fax: [phone]    OFFICE VISIT:  3/29/2022    Naomi Grant - : 1945    Reason For Visit:  Viola Lewis is a 64 y.o. male who is here for 6 Month Follow-Up and Coronary Artery Disease       Diagnosis Orders   1. Coronary artery disease involving native coronary artery of native heart without angina pectoris     2. Ischemic cardiomyopathy     3. Mixed hyperlipidemia     4. Primary hypertension     5. History of COVID-19           HPI  Patient is here for follow-up with a history of CAD, chronic systolic and diastolic heart failure, hypertension, hyperlipidemia, untreated sleep apnea. He had a  heart cath on 3/8/2021 and  was found to have occluded small vessel RCA and intermediate proximal circumflex disease and an occluded stent in the mid RCA. Patient denies chest pain or unusual dyspnea. No signs of fluid overload such as sudden weight gain, orthopnea, PND, edema, or palpitations. Patient reports he had COVID at the end of . He states he did receive a monoclonal antibody infusion and recovered well without hospitalization. He has had some ongoing issues with stress fractures in his feet which make it difficult for him to exercise    [facility], DO is PCP.   Naomi Burns has the following history as recorded in [facility]:    Patient Active Problem List    Diagnosis Date Noted    Ischemic cardiomyopathy     History of COVID-19 2022    Hx of CABG 07/10/2018    Open wound of right foot 2017    Surgical wound dehiscence 10/17/2017    Class 2 obesity in adult 10/17/2017    Wound infection after surgery 10/17/2017    Open displaced fracture of fifth metatarsal bone 2017    CAD (coronary artery disease)     Sleep apnea     Mixed hyperlipidemia     HTN (hypertension)      Past Medical History:   Diagnosis Date    CAD (coronary artery disease)     CABG    Cervical spinal stenosis     Claustrophobia     Depression     Diverticulitis     HTN (hypertension)     Hyperlipidemia     Dr. Dax Dewitt manages    [facility])     MI, old     Sleep apnea     wear CPAP     Past Surgical History:   Procedure Laterality Date    CARDIAC CATHETERIZATION  2011    Chronic occusions med rx for now   [address] GRAFT  2003    In  [address] Right     [address] / ANKLE Right 2017    HARDWARE REMOVAL AND REVISION ORIF 5TH METATARSAL performed by Abbie Santana DO at [facility] HARDWARE FROM TOE Right 11/3/2017    INCISION AND DRAINAGE REMOVAL INFECTED HARDWARE RIGHT FOOT performed by Abbie Santana DO at [address] [place] [address]     History reviewed. No pertinent family history. Social History     Tobacco Use    Smoking status: Former Smoker     Quit date: 2002     Years since quittin.3    Smokeless tobacco: Never Used   Substance Use Topics    Alcohol use: No      Current Outpatient Medications   Medication Sig Dispense Refill    carvedilol (COREG) 6.25 MG tablet Take 1 tablet by mouth 2 times daily (with meals) 180 tablet 3    atorvastatin (LIPITOR) 80 MG tablet Take 80 mg by mouth daily      amLODIPine (NORVASC) 2.5 MG tablet Take 2.5 mg by mouth daily      lisinopril (PRINIVIL;ZESTRIL) 20 MG tablet Take 10 mg by mouth daily       ranolazine (RANEXA) 500 MG extended release tablet Take 1,000 mg by mouth 2 times daily      omeprazole (PRILOSEC) 20 MG capsule Take 20 mg by mouth daily.  dipyridamole (PERSANTINE) 75 MG tablet Take 75 mg by mouth 3 times daily.  Omega-3 Fatty Acids (FISH OIL) 1000 MG CAPS Take 1,000 mg by mouth 2 times daily.  aspirin 325 MG tablet Take 325 mg by mouth daily.       Coenzyme Q10 (CO Q 10) 10 MG CAPS Take 1 capsule by mouth daily        No current facility-administered medications for this visit. Allergies: Crestor [rosuvastatin]    Review of Systems  Review of Systems   Constitutional: Positive for fatigue. Negative for activity change, diaphoresis, fever and unexpected weight change. HENT: Negative for facial swelling and nosebleeds. Eyes: Negative for redness and visual disturbance. Respiratory: Negative for cough, chest tightness, shortness of breath and wheezing. Cardiovascular: Negative for chest pain, palpitations and leg swelling. Gastrointestinal: Negative for abdominal pain, nausea and vomiting. Endocrine: Negative for cold intolerance and heat intolerance. Genitourinary: Negative for dysuria and hematuria. Musculoskeletal: Negative for arthralgias and myalgias. Skin: Negative for pallor and rash. Neurological: Negative for dizziness (occasional with postion change), seizures, syncope, weakness and light-headedness. Hematological: Does not bruise/bleed easily. Psychiatric/Behavioral: Negative for agitation. The patient is not nervous/anxious. Objective  Vital Signs - /78   Pulse 70   Ht 5' 11\" (1.803 m)   Wt 242 lb (109.8 kg)   SpO2 98%   BMI 33.75 kg/m²    Wt Readings from Last 3 Encounters:   03/29/22 242 lb (109.8 kg)   09/28/21 249 lb (112.9 kg)   08/17/21 247 lb (112 kg)      Physical Exam  Vitals and nursing note reviewed. Constitutional:       General: He is not in acute distress. Appearance: He is well-developed. He is not diaphoretic. Comments: Elderly   HENT:      Head: Normocephalic and atraumatic. Right Ear: Hearing and external ear normal.      Left Ear: Hearing and external ear normal.      Nose: Nose normal.   Eyes:      General:         Right eye: No discharge. Left eye: No discharge. Pupils: Pupils are equal, round, and reactive to light. Neck:      Thyroid: No thyromegaly. Vascular: No carotid bruit or JVD. Trachea: No tracheal deviation. Cardiovascular:      Rate and Rhythm: Normal rate and regular rhythm. Heart sounds: Normal heart sounds. No murmur heard. No friction rub. No gallop. Pulmonary:      Effort: Pulmonary effort is normal. No respiratory distress. Breath sounds: Normal breath sounds. No wheezing or rales. Abdominal:      Palpations: Abdomen is soft. Tenderness: There is no abdominal tenderness. Musculoskeletal:         General: No swelling or deformity. Cervical back: Neck supple. No muscular tenderness. Right lower leg: No edema. Left lower leg: No edema. Skin:     General: Skin is warm and dry. Findings: No rash. Neurological:      General: No focal deficit present. Mental Status: He is alert and oriented to person, place, and time. Cranial Nerves: No cranial nerve deficit. Psychiatric:         Mood and Affect: Mood normal.         Behavior: Behavior normal.         Judgment: Judgment normal.         Data:  Heart Cath 3/8/21  Conclusions      Triple vessel disease with severe proximal LAD stenosis, occluded small   RCA and intermediate proximal circumflex disease. Patent LIMA to distal LAD. Occluded stent in mid RCA. Mild LV dilatation with probable mild LV systolic function (suboptimal   contrast injection). Echo 1/22/21  Conclusions      Summary   Moderate concentric left ventricular hypertrophy. Moderate left ventricular global hypokinesis. Left ventricular ejection fraction is visually estimated at 40-45%. Grade 2 LV diastolic dysfunction. The left atrium is mildly dilated. Lab Results   Component Value Date    CHOL 143 (L) 04/15/2021    TRIG 252 (H) 04/15/2021    HDL 32 (L) 04/15/2021    LDLCALC 61 04/15/2021     Lab Results   Component Value Date    ALT 19 04/15/2021    AST 19 04/15/2021        Assessment:     Diagnosis Orders   1. Coronary artery disease involving native coronary artery of native heart without angina pectoris     2.  Ischemic cardiomyopathy     3. Mixed hyperlipidemia     4. Primary hypertension     5. History of COVID-19         CAD-status post  heart cath 3/8/2021 with medical treatment recommended. Stable with no angina symptoms. Continue aspirin, carvedilol, Ranexa, amlodipine    Ischemic cardiomyopathy/ chronic combined systolic and diastolic heart failure NYHA class II, stage C, EF 40 to 45%- patient appears euvolemic on GDMT with carvedilol and lisinopril. I Counseled on daily weights, reporting weight gain of 3lbs in 24hrs or 5lbs in a week, low sodium diet, and fluid restrictions 6 cupsor 48oz daily. Repeat 2D echocardiogram to verify LVEF    Hypertension-stable on current regimen with amlodipine, carvedilol, lisinopril    Hyperlipidemia-stable on atorvastatin. Triglycerides are elevated. We discussed decreasing sugars and starches in the diet. Reports he had recent labs at PCP office. Asked him to forward labs to our office in the future. Stable cardiovascular status. No evidence of overt heart failure,angina or dysrhythmia. Plan      Return in about 6 months (around 9/29/2022) for Dr. Gerardo Gamboa. Echo    - Weigh daily and report weight gain of 3lbs or more in 24hrs or 5lbs in one week. - Call for increasing shortness of breath or increasing swelling in feet and legs. (This could mean you are retaining too much fluid)  - 2000mg low sodium diet  - Fluid restriction of 1500ml per day (about 6 cups of fluid per day)    Call with any questionsor concerns  Follow up with [facility], DO for non cardiac problems  Report any new problems  Cardiovascular Fitness-Exercise as tolerated. Strive for 15 minutes of exercise most days of the week. Cardiac / HealthyDiet  Continue current medications as directed  Continue plan of treatment  It is always recommended that you bring your medicationsbottles with you to each visit - this is for your safety!        ALLAN Patricio

## 2022-04-06 ENCOUNTER — TELEPHONE (OUTPATIENT)
Dept: CARDIOLOGY CLINIC | Age: 77
End: 2022-04-06

## 2022-04-06 DIAGNOSIS — I25.5 ISCHEMIC CARDIOMYOPATHY: Primary | ICD-10-CM

## 2022-04-06 NOTE — TELEPHONE ENCOUNTER
Dagoberto Remy requests that someone  return their call. The best time to reach him is Anytime. The pt wife is calling to follow up on the ECHO the pt needs. Thank you.

## 2022-04-15 ENCOUNTER — HOSPITAL ENCOUNTER (OUTPATIENT)
Dept: NON INVASIVE DIAGNOSTICS | Age: 77
Discharge: HOME | End: 2022-04-15
Payer: MEDICARE

## 2022-04-15 DIAGNOSIS — I25.5 ISCHEMIC CARDIOMYOPATHY: ICD-10-CM

## 2022-04-15 LAB
LV EF: 25 %
LVEF MODALITY: NORMAL

## 2022-04-15 PROCEDURE — C8929 TTE W OR WO FOL WCON,DOPPLER: HCPCS

## 2022-04-15 PROCEDURE — 6360000004 HC RX CONTRAST MEDICATION: Performed by: INTERNAL MEDICINE

## 2022-04-15 RX ADMIN — PERFLUTREN 1.5 ML: 6.52 INJECTION, SUSPENSION INTRAVENOUS at 09:35

## 2022-04-28 ENCOUNTER — OFFICE VISIT (OUTPATIENT)
Dept: CARDIOLOGY CLINIC | Age: 77
End: 2022-04-28
Payer: MEDICARE

## 2022-04-28 VITALS
WEIGHT: 240 LBS | HEART RATE: 74 BPM | SYSTOLIC BLOOD PRESSURE: 128 MMHG | DIASTOLIC BLOOD PRESSURE: 84 MMHG | BODY MASS INDEX: 33.6 KG/M2 | HEIGHT: 71 IN

## 2022-04-28 DIAGNOSIS — E78.2 MIXED HYPERLIPIDEMIA: ICD-10-CM

## 2022-04-28 DIAGNOSIS — I25.5 ISCHEMIC CARDIOMYOPATHY: ICD-10-CM

## 2022-04-28 DIAGNOSIS — I10 ESSENTIAL HYPERTENSION: ICD-10-CM

## 2022-04-28 DIAGNOSIS — I25.10 CORONARY ARTERY DISEASE INVOLVING NATIVE CORONARY ARTERY OF NATIVE HEART WITHOUT ANGINA PECTORIS: Primary | ICD-10-CM

## 2022-04-28 PROCEDURE — G8427 DOCREV CUR MEDS BY ELIG CLIN: HCPCS | Performed by: CLINICAL NURSE SPECIALIST

## 2022-04-28 PROCEDURE — 99214 OFFICE O/P EST MOD 30 MIN: CPT | Performed by: CLINICAL NURSE SPECIALIST

## 2022-04-28 PROCEDURE — 4040F PNEUMOC VAC/ADMIN/RCVD: CPT | Performed by: CLINICAL NURSE SPECIALIST

## 2022-04-28 PROCEDURE — G8417 CALC BMI ABV UP PARAM F/U: HCPCS | Performed by: CLINICAL NURSE SPECIALIST

## 2022-04-28 PROCEDURE — 1036F TOBACCO NON-USER: CPT | Performed by: CLINICAL NURSE SPECIALIST

## 2022-04-28 PROCEDURE — 1123F ACP DISCUSS/DSCN MKR DOCD: CPT | Performed by: CLINICAL NURSE SPECIALIST

## 2022-04-28 RX ORDER — RANOLAZINE 1000 MG/1
500 TABLET, EXTENDED RELEASE ORAL 2 TIMES DAILY
COMMUNITY

## 2022-04-28 RX ORDER — CARVEDILOL 12.5 MG/1
12.5 TABLET ORAL 2 TIMES DAILY WITH MEALS
COMMUNITY

## 2022-04-28 NOTE — PROGRESS NOTES
[address] [place], [address] [address]  Phone: [phone]  Fax: [phone]    OFFICE VISIT:  2022    Bebo Reyna - : 1945    Reason For Visit:  Rosalia Conner is a 64 y.o. male who is here for Follow-up (no cardiac symptoms) and Coronary Artery Disease  History of coronary disease with stenting and bypass in . Underwent nuclear stress test 2020 that showed large inferior infarct with no new ischemia.  EF 56%.  This is unchanged from previous Lexiscan 2018  2D echo 2021 showed LV global hypokinesis with EF 40 to 45%  Heart catheterization 3/8/2021 showed triple-vessel disease with patent LIMA to distal LAD, occluded stent in mid RCA. Recommend ongoing medical management    Patient had repeat 2D echo 2022 showed dilated LV with severe impairment of systolic function with EF 25%. Global hypokinesis. Mild MR. He returns today for follow-up accompanied with his wife. He states overall activity tolerance is about the same. No significant fluid buildup. Weight has been stable. Subjective  Rosalia Conner denies exertional chest pain, shortness of breath, orthopnea, paroxysmal nocturnal dyspnea, syncope, presyncope, arrhythmia, edema and fatigue. The patient denies numbness or weakness to suggest cerebrovascular accident or transient ischemic attack. Lauryn Mancilla, DO is PCP and follows labs.   Bebo Reyna has the following history as recorded in [facility]:    Patient Active Problem List    Diagnosis Date Noted    Ischemic cardiomyopathy     History of COVID-19 2022    Hx of CABG 07/10/2018    Open wound of right foot 2017    Surgical wound dehiscence 10/17/2017    Class 2 obesity in adult 10/17/2017    Wound infection after surgery 10/17/2017    Open displaced fracture of fifth metatarsal bone 2017    CAD (coronary artery disease)     Sleep apnea     Mixed hyperlipidemia     HTN (hypertension)      Past Medical History: Diagnosis Date    CAD (coronary artery disease)     CABG    Cervical spinal stenosis     Claustrophobia     Depression     Diverticulitis     HTN (hypertension)     Hyperlipidemia     Dr. Hien Wren manages    MI [facility]myocardial infarction) ([facility] [address]     MI, old     Sleep apnea     wear CPAP     Past Surgical History:   Procedure Laterality Date    CARDIAC CATHETERIZATION  2011    Chronic occusions med rx for now   [address] GRAFT  2003    In  [address]     [address] / ANKLE Right 2017    HARDWARE REMOVAL AND REVISION ORIF 5TH METATARSAL performed by Kathy Monreal DO at [facility] HARDWARE FROM TOE Right 11/3/2017    INCISION AND DRAINAGE REMOVAL INFECTED HARDWARE RIGHT FOOT performed by Kathy Monreal DO at [address] [place] [address]     History reviewed. No pertinent family history. Social History     Tobacco Use    Smoking status: Former Smoker     Quit date: 2002     Years since quittin.4    Smokeless tobacco: Never Used   Substance Use Topics    Alcohol use: No      Current Outpatient Medications   Medication Sig Dispense Refill    carvedilol (COREG) 12.5 MG tablet Take 12.5 mg by mouth 2 times daily (with meals)      ranolazine (RANEXA) 1000 MG extended release tablet Take 500 mg by mouth 2 times daily      atorvastatin (LIPITOR) 80 MG tablet Take 80 mg by mouth daily      amLODIPine (NORVASC) 2.5 MG tablet Take 2.5 mg by mouth daily      lisinopril (PRINIVIL;ZESTRIL) 20 MG tablet Take 10 mg by mouth daily       omeprazole (PRILOSEC) 20 MG capsule Take 20 mg by mouth daily.  dipyridamole (PERSANTINE) 75 MG tablet Take 75 mg by mouth 3 times daily.  Omega-3 Fatty Acids (FISH OIL) 1000 MG CAPS Take 1,000 mg by mouth 2 times daily.  aspirin 325 MG tablet Take 325 mg by mouth daily.       Coenzyme Q10 (CO Q 10) 10 MG CAPS Take 1 capsule by mouth daily       ranolazine (RANEXA) 500 MG extended release tablet Take 1,000 mg by mouth 2 times daily (Patient not taking: Reported on 4/28/2022)       No current facility-administered medications for this visit. Allergies: Crestor [rosuvastatin]    Review of Systems  Constitutional - no significant activity change, appetite change, or unexpected weight change. No fever, chills or diaphoresis. No fatigue. HEENT - no significant rhinorrhea or epistaxis. No tinnitus or significant hearing loss. Eyes - no sudden vision change or amaurosis. Respiratory - no significant wheezing, stridor, apnea or cough. No dyspnea on exertion or shortness of breath. Cardiovascular - no exertional chest pain, orthopnea or PND. No sensation of arrhythmia or slow heart rate. No claudication or leg edema. Gastrointestinal - no abdominal swelling or pain. No blood in stool. No severe constipation, diarrhea, nausea, or vomiting. Genitourinary - no difficulty urinating, dysuria, frequency, or urgency. No flank pain or hematuria. Musculoskeletal - no back pain, gait disturbance, or myalgia. Skin - no color change or rash. No pallor. No new surgical incision. Neurologic - no speech difficulty, facial asymmetry or lateralizing weakness. No seizures, presyncope, syncope, or significant dizziness. Hematologic - no easy bruising or excessive bleeding. Psychiatric - no severe anxiety or insomnia. No confusion. All other review of systems are negative. Objective  Vital Signs - /84   Pulse 74   Ht 5' 11\" (1.803 m)   Wt 240 lb (108.9 kg)   BMI 33.47 kg/m²   General - Rodney Zayas is alert, cooperative, and pleasant. Well groomed. No acute distress. Body habitus is overweight. HEENT - The head is normocephalic. No circumoral cyanosis. Dentition is normal.   EYES -  No Xanthelasma, no arcus senilis, no conjunctival hemorrhages or discharge.    Neck - Supple, without increased jugular venous pressures. No carotid bruits. No mass. Respiratory - Lungs are clear bilaterally. No wheezes or rales. Normal effort without use of accessory muscles. Cardiovascular - Heart has regular rhythm and rate. No murmurs, rubs or gallops. + pedal pulses and no varicosities. Abdominal -  Soft, nontender, nondistended. Bowel sounds are intact. Extremities - No clubbing, cyanosis, or  edema. Musculoskeletal -  No clubbing . No Osler's nodes. Gait normal .  No kyphosis or scoliosis. Skin -  no statis ulcers or dermatitis. Neurological - No focal signs are identified. Oriented to person, place and time. Psychiatric -  Appropriate affect and mood. Assessment:     Diagnosis Orders   1. Coronary artery disease involving native coronary artery of native heart without angina pectoris     2. Ischemic cardiomyopathy     3. Mixed hyperlipidemia     4. Essential hypertension       Data:  BP Readings from Last 3 Encounters:   04/28/22 128/84   03/29/22 134/78   09/28/21 114/70    Pulse Readings from Last 3 Encounters:   04/28/22 74   03/29/22 70   09/28/21 60        Wt Readings from Last 3 Encounters:   04/28/22 240 lb (108.9 kg)   03/29/22 242 lb (109.8 kg)   09/28/21 249 lb (112.9 kg)   Blood pressure and heart rate controlled. Weight is down some. Patient remains on medical management including beta-blocker, ACE inhibitor, CCB  DAPT and aspirin. On Ranexa for chronic angina. Heart catheterization March 2021   Triple vessel disease with severe proximal LAD stenosis, occluded small   RCA and intermediate proximal circumflex disease. Patent LIMA to distal LAD. Occluded stent in mid RCA. Mild LV dilatation with probable mild LV systolic function (suboptimal   contrast injection). Recommendations    Medical management.     Signatures    ---------------------------------------------------------------   Electronically signed by Afia King MD(Performing Physician) on   03/08/2021 21:16    2D echo 4/6/2022   Left ventricle was dilated with normal thickness   Severe impairment of left ventricular systolic function, estimated   ejection fraction 25%   Global hypokinesis   Grade 1 diastolic dysfunction with increased left atrial filling pressure   Mild mitral regurgitation   Mild tricuspid insufficiency with estimated right ventricular systolic   pressure between 30 to 35 mm which is normal   Dilated right ventricle with severe impairment of systolic function   GLS of the left ventricle is extremely abnormal suggestive of poor   systolic function    Signature    ----------------------------------------------------------------   Electronically signed by Jn Ramírez MD(Interpreting physician)   on 04/15/2022 10:55 AM    Overall patient has not had any change in activity tolerance. We discussed ongoing medical management and should he have any fluid buildup we will add Aldactone or difficulty to control blood pressure  Does have some underlying CKD. We will try to get recent labs from PCP    We discussed the option of AICD placement for primary prevention of sudden cardiac death due to ongoing LV dysfunction with EF less than 35%. He is not interested at this time. States taking medications as prescribed  Stable cardiovascular status. No evidence of overt heart failure, angina or dysrhythmia. 30 minutes were spent preparing, reviewing and seeing patient. All questions answered    Plan    Follow up in Dec With Dr. Shantal Enciso   Call with any questions or concerns  Watch for fluid retention or change of activity tolerance  Follow up with [facility], DO for non cardiac problems  Report any new problems  Cardiovascular Fitness-Exercise as tolerated. Strive for 30 minutes of exercise most days of the week.     Cardiac / Healthy Diet- low sodium  Continue current medications as directed  Continue plan of treatment  It is always recommended that you bring your medications bottles with you to each visit - this is for your safety! ALLAN Molina dragon/transcription disclaimer: Much of this encounter note is electronic transcription/translation of spoken language to printed tach. Electronic translation of spoken language may be erroneous, or at times, nonsensical words or phrases may be inadvertently transcribed.  Although, I have reviewed the note for such errors, some may still exist.

## 2022-04-28 NOTE — PATIENT INSTRUCTIONS
Follow up in Dec With Dr. Mateo Pelayo   Call with any questions or concerns  Follow up with [facility], DO for non cardiac problems  Report any new problems  Cardiovascular Fitness-Exercise as tolerated. Strive for 30 minutes of exercise most days of the week. Cardiac / Healthy Diet- low sodium  Continue current medications as directed  Continue plan of treatment  It is always recommended that you bring your medications bottles with you to each visit - this is for your safety!

## 2022-05-09 NOTE — PATIENT INSTRUCTIONS
Return in about 6 months (around 10/6/2021) for APRN. Check cholesterol fasting  Encourage regular walking    - Weigh daily and report weight gain of 3lbs or more in 24hrs or 5lbs in one week. - Call for increasing shortness of breath or increasing swelling in feet and legs.     (This could mean you are retaining too much fluid)  - 2000mg low sodium diet  - Fluid restriction of 1500ml per day (about 6 cups of fluid per day) Endocrinology

## 2022-12-20 ENCOUNTER — OFFICE VISIT (OUTPATIENT)
Dept: CARDIOLOGY CLINIC | Age: 77
End: 2022-12-20
Payer: MEDICARE

## 2022-12-20 VITALS
DIASTOLIC BLOOD PRESSURE: 72 MMHG | HEIGHT: 71 IN | BODY MASS INDEX: 33.32 KG/M2 | SYSTOLIC BLOOD PRESSURE: 112 MMHG | WEIGHT: 238 LBS | HEART RATE: 74 BPM

## 2022-12-20 DIAGNOSIS — Z95.1 HX OF CABG: ICD-10-CM

## 2022-12-20 DIAGNOSIS — I25.10 CORONARY ARTERY DISEASE INVOLVING NATIVE CORONARY ARTERY OF NATIVE HEART WITHOUT ANGINA PECTORIS: Primary | ICD-10-CM

## 2022-12-20 DIAGNOSIS — R00.2 PALPITATIONS: ICD-10-CM

## 2022-12-20 DIAGNOSIS — I25.5 ISCHEMIC CARDIOMYOPATHY: ICD-10-CM

## 2022-12-20 PROCEDURE — 3078F DIAST BP <80 MM HG: CPT | Performed by: INTERNAL MEDICINE

## 2022-12-20 PROCEDURE — G8484 FLU IMMUNIZE NO ADMIN: HCPCS | Performed by: INTERNAL MEDICINE

## 2022-12-20 PROCEDURE — 93000 ELECTROCARDIOGRAM COMPLETE: CPT | Performed by: INTERNAL MEDICINE

## 2022-12-20 PROCEDURE — 1123F ACP DISCUSS/DSCN MKR DOCD: CPT | Performed by: INTERNAL MEDICINE

## 2022-12-20 PROCEDURE — 1036F TOBACCO NON-USER: CPT | Performed by: INTERNAL MEDICINE

## 2022-12-20 PROCEDURE — G8427 DOCREV CUR MEDS BY ELIG CLIN: HCPCS | Performed by: INTERNAL MEDICINE

## 2022-12-20 PROCEDURE — 99214 OFFICE O/P EST MOD 30 MIN: CPT | Performed by: INTERNAL MEDICINE

## 2022-12-20 PROCEDURE — 3074F SYST BP LT 130 MM HG: CPT | Performed by: INTERNAL MEDICINE

## 2022-12-20 PROCEDURE — G8417 CALC BMI ABV UP PARAM F/U: HCPCS | Performed by: INTERNAL MEDICINE

## 2022-12-20 NOTE — PATIENT INSTRUCTIONS
[facility] at the [address] [facility] and [address] [facility] located on the first floor of [place] [address] through hospital main entrance and turn immediately to your left. Patient's contact number:  [phone] (home)      Lexiscan Stress Test      Lexiscan (regadenoson injection) is a prescription drug given through an IV line that increases blood flow through the arteries of the heart during a cardiac nuclear stress test.     There are two parts to a Lexiscan stress test: the rest portion and the exercise portion. For the rest portion, a radioactive tracer is injected into your arm through the IV. After 30 to 60 minutes, the process of imaging will begin. A nuclear camera will be placed on your chest area and images are taken for the next 15 to 20 minutes. For the exercise portion, a nurse will attach EKG electrodes to your chest to monitor your heart rate. The drug Liss Castellani is administered to simulate stress on the heart. Your heart rhythm will then be monitored for the next few minutes. Your blood pressure will also be monitored throughout the exercise portion. [place] through the exercise portion, a second round of radioactive tracer is injected into your body. Your heart rate and EKG will be monitored for another few minutes after administering the drug. Test Preparation:    Bring a list of your current medications. Do not take any of your medications the morning of the test, but bring all morning medications with you as you will take them after the stress portion of the test is completed. Do not eat Bananas 24 hours prior to test.    No caffeine 24 hours prior to the testing. This includes: coffee, pop/soda, chocolate, cold medications, etc.  Any product that might contain caffeine. No nicotine or alcohol 12 hours prior to your test.   Nothing to eat or drink 6-8 hours prior to appointment time.   It is okay to drink small amounts of water during the four hours prior to the test.  Nitroglycerin patches must be taken off 1 hour before testing. Wear comfortable clothing. Please refrain from any strenuous exercise or activities the day before your test, or the day of your test.  The Nuclear Lexiscan Stress test takes about 2 ½ to 3 hours to complete. If for any reason you are unable to keep this appointment, please contact Outpatient Scheduling, [phone], as soon as possible to reschedule.

## 2022-12-20 NOTE — PROGRESS NOTES
HISTORY  68-year-old gentleman with a history of dyslipidemia, past tobacco abuse, hypertension, obstructive sleep apnea, coronary disease, and left ventricular dysfunction returns for follow-up. 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 Stress testing in 2019 demonstrated an EF of 46% with no evidence of ischemia. He was restudied in March 2021 revealing a patent LIMA to his severely stenosed LAD, and occluded right coronary stent, a 50% proximal circumflex, and a 55% first marginal.  His lipids were last checked in August with an LDL of 70, HDL 32, and 227. Because of persistent pressures in the 130s his carvedilol was increased in August 2021. On return today he relates no change in his dyspnea on exertion which represented his only previous anginal symptoms. He does however relate episodes of rapid heartbeat which repeatedly wake him up at night. He has been vaccinated and boosted x3 for COVID-19. PHYSICAL EXAM  On exam he carries 238 pounds on a 5 foot 11 inches frame. Pressure is 112/72 pulse of 74. Normal male balding pattern. EOMs full, sclerae and conjunctiva normal. PERRLA. Mask in place. Trachea midline with no neck masses. Assessment of internal jugular veins reveals no elevation of central venous pressure at 45 degrees. Carotid pulses normal without delay or bruit. Thyroid normal to palpation. Chest exam reveals normal respiratory effort, no abnormal breath sounds and normal expiratory phase. No skin lesions seen. PMI normal. S1, S2 normal without murmur or varsha or click. Mild abdominal obesity. Normal bowel sounds without palpable mass or bruit. No clubbing or acrocyanosis. No significant lower extremity edema or signs of venous insufficiency. General motor strength appears to be within normal limits. Normal range of motion with normal gait.  Alert, oriented x 3, memory and cognition normal as reflected by history and conversation. EKG reveals sinus rhythm with inferior Q waves and poor R wave progression precluding exclusion of previous infarcts and diffuse nonspecific ST-T wave abnormalities. ASSESSMENT/PLAN:   Coronary disease -questionable anginal warning system. We will obtain repeat Lexiscan dual-isotope in 6 months as he had to 50% stenoses demonstrated in the spring 2021. Continue carvedilol, Ranexa, lisinopril, and low-dose aspirin. Episodic tachycardia -we will place extended monitor to exclude recurrent atrial fibrillation. Continue carvedilol. Dyslipidemia -well-controlled. Continue Lipitor and fish oil along with coenzyme Q10  Hypertension -well-controlled.   Continue carvedilol, lisinopril, and amlodipine  Pandemic response -appropriate/vaccinated/boosted x3

## 2023-01-09 ENCOUNTER — TELEPHONE (OUTPATIENT)
Dept: CARDIOTHORACIC SURGERY | Age: 78
End: 2023-01-09

## 2023-01-09 NOTE — TELEPHONE ENCOUNTER
Keli Mancilla, from [facility] calling to report pt who wore Zio 12/22-1/3/23 had   2 - total pauses with longest 6.6 seconds (Page 15, strip 5 12/30 at 1:15am)   2- episodes of SVT   Please print Zio report and give to Dr Flip Montez.

## 2023-01-10 DIAGNOSIS — I45.5 SINUS PAUSE: Primary | ICD-10-CM

## 2023-01-10 DIAGNOSIS — G47.34 NOCTURNAL OXYGEN DESATURATION: ICD-10-CM

## 2023-01-10 NOTE — TELEPHONE ENCOUNTER
Per Dr. Mariola Conti, patient had nocturnal pauses. Discussed with Dr. Zheng Mohamud with EP. Sleep study was recommended. Per Dr. Mariola Conti refer to Dr. Guero Crain for sleep study. Spoke with patient's wife. She will have patient call back to discuss this when he is available.

## 2023-01-11 ENCOUNTER — TELEPHONE (OUTPATIENT)
Dept: NEUROLOGY | Age: 78
End: 2023-01-11

## 2023-01-11 NOTE — TELEPHONE ENCOUNTER
Received a referral from Dr. Rex Johnson office for this patient. Called and left patient a VM to call our office back to where we can get patient scheduled an appointment with Dr. Leslye Bertrand for eval for sleep study. Left on VM for patient to call our office back to where we can get patient scheduled an appointment. Please schedule patient an appointment when patient calls office back.

## 2023-02-28 ENCOUNTER — OFFICE VISIT (OUTPATIENT)
Dept: NEUROLOGY | Age: 78
End: 2023-02-28
Payer: MEDICARE

## 2023-02-28 VITALS
HEIGHT: 71 IN | DIASTOLIC BLOOD PRESSURE: 90 MMHG | BODY MASS INDEX: 32.9 KG/M2 | HEART RATE: 78 BPM | OXYGEN SATURATION: 98 % | SYSTOLIC BLOOD PRESSURE: 138 MMHG | WEIGHT: 235 LBS

## 2023-02-28 DIAGNOSIS — I45.5 SINUS PAUSE: ICD-10-CM

## 2023-02-28 DIAGNOSIS — G47.33 SLEEP APNEA, OBSTRUCTIVE: Primary | ICD-10-CM

## 2023-02-28 DIAGNOSIS — I50.22 CHRONIC SYSTOLIC CONGESTIVE HEART FAILURE (HCC): ICD-10-CM

## 2023-02-28 DIAGNOSIS — I25.5 ISCHEMIC CARDIOMYOPATHY: ICD-10-CM

## 2023-02-28 PROCEDURE — 3080F DIAST BP >= 90 MM HG: CPT | Performed by: PSYCHIATRY & NEUROLOGY

## 2023-02-28 PROCEDURE — 3075F SYST BP GE 130 - 139MM HG: CPT | Performed by: PSYCHIATRY & NEUROLOGY

## 2023-02-28 PROCEDURE — 1123F ACP DISCUSS/DSCN MKR DOCD: CPT | Performed by: PSYCHIATRY & NEUROLOGY

## 2023-02-28 PROCEDURE — 1036F TOBACCO NON-USER: CPT | Performed by: PSYCHIATRY & NEUROLOGY

## 2023-02-28 PROCEDURE — G8417 CALC BMI ABV UP PARAM F/U: HCPCS | Performed by: PSYCHIATRY & NEUROLOGY

## 2023-02-28 PROCEDURE — G8484 FLU IMMUNIZE NO ADMIN: HCPCS | Performed by: PSYCHIATRY & NEUROLOGY

## 2023-02-28 PROCEDURE — 99203 OFFICE O/P NEW LOW 30 MIN: CPT | Performed by: PSYCHIATRY & NEUROLOGY

## 2023-02-28 PROCEDURE — G8427 DOCREV CUR MEDS BY ELIG CLIN: HCPCS | Performed by: PSYCHIATRY & NEUROLOGY

## 2023-02-28 ASSESSMENT — ENCOUNTER SYMPTOMS
PHOTOPHOBIA: 0
VOMITING: 0
COUGH: 0
CHOKING: 0
BACK PAIN: 0
NAUSEA: 0

## 2023-02-28 NOTE — PATIENT INSTRUCTIONS
INSTRUCTIONS:  Will have you get an overnight pulse oximetry. We sent the order to [facility] and you can pick the device up there. After that we will determine if you need to use oxygen while you sleep.

## 2023-02-28 NOTE — PROGRESS NOTES
[facility]  [address] [place] [address] Drive, [address] [place] [address],[address], [place] [address]  Phone [phone]  [phone]     [facility] SLEEP NEW PATIENT VISIT        Patient: Clarisse Telles  :  1945  Age:  76 y.o. MRN:  [id number]  Account #:  [de-identified]  Today:  23    Referring Provider: MD Marcus Georges 55  [facility] [address] [place] [address]  [place] 7  Consulting Provider: Theodis Mortimer, M.D. Margery Mekhi:  Chief Complaint   Patient presents with    New Patient     Referred by Dr. Ada Sanchez for MAGNOLIA. Pt reports he has tried to use CPAP in the past with no success. History Source: History obtained from the patient. PCP: Stephen Chávez DO    HISTORY OF PRESENT ILLNESS:  Clarisse Telles is a 76y.o. year old man with a history of obstructive sleep apnea who was referred for sleep evaluation. He was diagnosed with MAGNOLIA many years ago and tried CPAP. He did not tolerate it. He refuses to retry it even though it has been perhaps 20 years and there have been innovations. He gets to sleep fairly rapidly. He wakens frequently, often with palpitations. He has nocturia. He feels rested when he gets up in the am.  He naps daily.       PAST MEDICAL HITORY:    Past Medical History:   Diagnosis Date    CAD (coronary artery disease)     CABG    Cervical spinal stenosis     Claustrophobia     Depression     Diverticulitis     HTN (hypertension)     Hyperlipidemia     Dr. Abiola Meeks manages    MI (myocardial infarction) [facility])     MI, old 2003    Sleep apnea     wear CPAP       Past Surgical History:   Procedure Laterality Date    CARDIAC CATHETERIZATION  2011    Chronic occusions med rx for now    [address] GRAFT  2003    In  [facility] Right     HARDWARE REMOVAL FOOT / ANKLE Right 2017    HARDWARE REMOVAL AND REVISION ORIF 5TH METATARSAL performed by Tricia Weber DO at [address] [place] [address] FROM TOE Right 11/3/2017    INCISION AND DRAINAGE REMOVAL INFECTED HARDWARE RIGHT FOOT performed by Gareth Tong DO at [facility]  None    Significant Injuries  None    Habits  Sharp Marilu Tierney reports that he quit smoking about 20 years ago. He has never used smokeless tobacco. He reports that he does not drink alcohol and does not use drugs. Current Outpatient Medications   Medication Sig Dispense Refill    Multiple Vitamins-Minerals (CENTRUM SILVER 50+MEN PO) Take by mouth daily      carvedilol (COREG) 12.5 MG tablet Take 12.5 mg by mouth 2 times daily (with meals)      ranolazine (RANEXA) 1000 MG extended release tablet Take 500 mg by mouth 2 times daily      atorvastatin (LIPITOR) 80 MG tablet Take 80 mg by mouth daily      amLODIPine (NORVASC) 2.5 MG tablet Take 2.5 mg by mouth daily      lisinopril (PRINIVIL;ZESTRIL) 20 MG tablet Take 10 mg by mouth daily       omeprazole (PRILOSEC) 20 MG capsule Take 20 mg by mouth daily. dipyridamole (PERSANTINE) 75 MG tablet Take 75 mg by mouth 3 times daily. Omega-3 Fatty Acids (FISH OIL) 1000 MG CAPS Take 1,000 mg by mouth 2 times daily. aspirin 325 MG tablet Take 325 mg by mouth daily. Coenzyme Q10 (CO Q 10) 10 MG CAPS Take 1 capsule by mouth daily        No current facility-administered medications for this visit. Allergies as of 02/28/2023 - Fully Reviewed 02/28/2023   Allergen Reaction Noted    Crestor [rosuvastatin]  04/17/2013       FAMILY HISTORY:   No family history on file. SOCIAL HISTORY:   Filiberto Gaona is , lives in [address], and is retired. REVIEW OF SYSTEMS:  Review of Systems   Constitutional:  Negative for chills and fever. HENT:  Positive for hearing loss and tinnitus. Eyes:  Negative for photophobia and visual disturbance. Respiratory:  Negative for cough and choking. Cardiovascular:  Negative for chest pain and palpitations.    Gastrointestinal:  Negative for nausea and vomiting. Endocrine: Negative for polydipsia and polyuria. Genitourinary:  Positive for frequency and urgency. Negative for testicular pain. Musculoskeletal:  Positive for arthralgias. Negative for back pain. Skin:  Negative for rash and wound. Allergic/Immunologic: Negative for environmental allergies. Neurological:  Negative for weakness, numbness and headaches. Hematological:  Negative for adenopathy. Does not bruise/bleed easily. Psychiatric/Behavioral:  Negative for dysphoric mood. The patient is not nervous/anxious. Sleep: Positive for snoring, leg cramps. Negative for excessive daytime sleepiness, restless legs. PHYSICAL EXAMINATION:  Vitals:  BP (!) 138/90   Pulse 78   Ht 5' 11\" (1.803 m)   Wt 235 lb (106.6 kg)   SpO2 98%   BMI 32.78 kg/m²   General appearance:  Alert, well developed, well nourished, in no distress  HEENT:  normocephalic, atraumatic, sclera appear normal, no nasal abnormalities, no rhinorrhea, Ears appear normal, oral mucous membranes are moist without erythema, trachea midline, thyroid is normal, no lymphadenopathy or neck mass. III (soft palate, base of uvula visible). Cardiovascular:  Regular rate and rhythm without murmer. No peripheral edema, No cyanosis or clubbing. No carotid bruits. Pulmonary:  Lungs are clear to auscultation. Breathing appears normal, good expansion, normal effort without use of accessory muscles  Musculoskeletal:  Joints are osteoarthritis  Integument:  No rash, erythema, or pallor  Psychiatric:  Mood, affect, and behavior appear normal      NEUROLOGIC EXAMINATION:  Mental Status:  alert, oriented to person, place, and time.   Speech:  Clear without dysarthria or dysphonia  Language:  Fluent without aphasia  Cranial Nerves:   II Visual fields are full to confrontation   III,IV, VI Extraocular movements are full   VII Facial movements are symmetrical without weakness   VIII Hearing is intact   XII No tongue atrophy or fasciculations. Normal tongue protrusion. No tongue weakness  Motor:  Normal strength in both upper and lower extremities. Normal muscle tone and bulk. Deep tendon reflexes are absent in both upper and lower extremities. Zuluaga's signs are absent bilaterally. There is no ankle clonus on either side. Sensation:  Sensation is intact to light touch and vibration in all extremities. Coordination:  Rapid alternating movements are normal in both upper and lower extremities. Finger to nose testing is unimpaired bilaterally. Gait:  mildly unsteady    ADDITIONAL REVIEW:  Narrative & Impression    Transthoracic Echocardiography Report (TTE)      Demographics      Patient Name  Jane SANTIZO  Date of Study          04/15/2022      MRN           [id number]        Gender                 Male      Date of Birth 1945    Room Number      Age           64 year(s)      Height:       71 inches     Referring Physician    Muna Nye      Weight:       233.01 pounds Sonographer            Radha Telles RDCS      BSA:          2.25 m^2      Interpreting Physician Neida Payan MD      BMI:          32.5 kg/m^2     Procedure     Type of Study      TTE procedure:ECHO 2D W/DOPPLER/COLOR/CONTRAST. Study Location: Echo Lab  Technical Quality: Limited visualization due to poor acoustical window. Patient Status: Outpatient     Contrast Medium: Definity. Amount - 5 ml     HR: 71 bpm BP: 190/98 mmHg     Indications:Cardiomyopathy, ischemic.       Conclusions      Summary   Left ventricle was dilated with normal thickness   Severe impairment of left ventricular systolic function, estimated   ejection fraction 25%   Global hypokinesis   Grade 1 diastolic dysfunction with increased left atrial filling pressure   Mild mitral regurgitation   Mild tricuspid insufficiency with estimated right ventricular systolic   pressure between 30 to 35 mm which is normal   Dilated right ventricle with severe impairment of systolic function   GLS of the left ventricle is extremely abnormal suggestive of poor   systolic function      Signature      ----------------------------------------------------------------   Electronically signed by Tiffanie Ruby MD(Interpreting physician)   on 04/15/2022 10:55 AM     Zio patch 12/2022 showed 2 episodes of SVT and nocturnal sinus pauses of 6.6 and 3.2 seconds. IMPRESSION:    ICD-10-CM    1. Sleep apnea, obstructive  G47.33 Pulse oximetry, overnight      2. Chronic systolic congestive heart failure (HCC)  I50.22       3. Sinus pause  I45.5       4. Ischemic cardiomyopathy  I25.5       He does not wish to pursue PAP or Inspire. He may use oxygen if needed. I discussed the diagnosis of obstructive sleep apnea with Elsa Tierney including thepathophysiology (namely the mechanism of breathing and obstruction of upper airway, interruptions of sleep, hypoxemia, hypercapnia, and results of repetitive sympathetic activation), risks, evaluation, and treatment options. I discussed the relationship of untreated sleep apnea to CHF and sinus pauses during sleep. I related the expected life span for someone with systolic CHF and untreated sleep apnea is half as long as someone with systolic CHF and treated MAGNOLIA. I discussed the risks of driving when drowsy and advised that Rudy Marinelli not drive when drowsy and avoid sedatingmedications and respiratory suppressants. PLAN:  1. Overnight pulse oximetry  2. Consider nocturnal oxygen if he qualifies  3.  Follow up here MIGUEL Moreno M.D.

## 2023-03-23 ENCOUNTER — TELEPHONE (OUTPATIENT)
Dept: NEUROLOGY | Age: 78
End: 2023-03-23

## 2023-03-23 NOTE — TELEPHONE ENCOUNTER
Overnight pulse oximetry shows that he does not need or qualify for oxygen during sleep    Spoke to patients wife Grecia Hameed and gave results of overnight pulse ox. She voiced understanding will give the results to the patient.

## 2023-04-07 NOTE — TELEPHONE ENCOUNTER
Pt called stating Someone from Dr. Jaun C Ventura office called and stated pt was fine his stress test looked fine. Then Dr. Sharmila Valle read the arnel scan and said pt needs a heart cath. Pt called very confused and wants to know if he need the heart cath or not. Pls advise. Heart cath is scheduled for Monday. I need to cancel if he don't need it. CONSTITUTIONAL: Well-appearing; well-nourished; in no apparent distress.   EYES: PERRL; EOM intact but with downward deviation crossing midline b/l  ENT: normal nose; no rhinorrhea; normal pharynx with no tonsillar hypertrophy.   NECK: Supple; non-tender; no cervical lymphadenopathy.   CARDIOVASCULAR: Normal S1, S2; no murmurs, rubs, or gallops.   RESPIRATORY: Normal chest excursion with respiration; breath sounds clear and equal bilaterally; no wheezes, rhonchi, or rales.  GI/: Normal bowel sounds; non-distended; non-tender; no palpable organomegaly.   MS: No evidence of trauma or deformity. Normal ROM in all four extremities; non-tender to palpation; distal pulses are normal.   SKIN: Normal for age and race; warm; dry; good turgor; no apparent lesions or exudate.   NEURO/PSYCH: A & O x 4; grossly unremarkable. mood and manner are appropriate. no facial droop. No tongue deviation. Cerebellar intact. Normal gait. Sensation intact

## 2023-10-03 ENCOUNTER — OFFICE VISIT (OUTPATIENT)
Dept: CARDIOLOGY CLINIC | Age: 78
End: 2023-10-03
Payer: MEDICARE

## 2023-10-03 VITALS
DIASTOLIC BLOOD PRESSURE: 70 MMHG | SYSTOLIC BLOOD PRESSURE: 132 MMHG | HEIGHT: 71 IN | WEIGHT: 220 LBS | OXYGEN SATURATION: 98 % | BODY MASS INDEX: 30.8 KG/M2 | HEART RATE: 70 BPM

## 2023-10-03 DIAGNOSIS — I25.10 CORONARY ARTERY DISEASE INVOLVING NATIVE CORONARY ARTERY OF NATIVE HEART WITHOUT ANGINA PECTORIS: Primary | ICD-10-CM

## 2023-10-03 PROCEDURE — 3078F DIAST BP <80 MM HG: CPT | Performed by: INTERNAL MEDICINE

## 2023-10-03 PROCEDURE — 1123F ACP DISCUSS/DSCN MKR DOCD: CPT | Performed by: INTERNAL MEDICINE

## 2023-10-03 PROCEDURE — 1036F TOBACCO NON-USER: CPT | Performed by: INTERNAL MEDICINE

## 2023-10-03 PROCEDURE — G8427 DOCREV CUR MEDS BY ELIG CLIN: HCPCS | Performed by: INTERNAL MEDICINE

## 2023-10-03 PROCEDURE — G8417 CALC BMI ABV UP PARAM F/U: HCPCS | Performed by: INTERNAL MEDICINE

## 2023-10-03 PROCEDURE — 3075F SYST BP GE 130 - 139MM HG: CPT | Performed by: INTERNAL MEDICINE

## 2023-10-03 PROCEDURE — G8484 FLU IMMUNIZE NO ADMIN: HCPCS | Performed by: INTERNAL MEDICINE

## 2023-10-03 PROCEDURE — 99214 OFFICE O/P EST MOD 30 MIN: CPT | Performed by: INTERNAL MEDICINE

## 2023-10-03 RX ORDER — SPIRONOLACTONE 25 MG/1
25 TABLET ORAL DAILY
Qty: 90 TABLET | Refills: 1 | Status: SHIPPED | OUTPATIENT
Start: 2023-10-03

## 2023-10-03 RX ORDER — ASPIRIN 81 MG/1
81 TABLET, CHEWABLE ORAL DAILY
Qty: 30 TABLET | Refills: 3 | Status: SHIPPED | OUTPATIENT
Start: 2023-10-03

## 2023-10-03 RX ORDER — NITROGLYCERIN 0.4 MG/1
0.4 TABLET SUBLINGUAL EVERY 5 MIN PRN
Qty: 25 TABLET | Refills: 3 | Status: SHIPPED | OUTPATIENT
Start: 2023-10-03

## 2023-10-03 ASSESSMENT — ENCOUNTER SYMPTOMS
APNEA: 0
EYE REDNESS: 0
SHORTNESS OF BREATH: 1
NAUSEA: 0
COUGH: 0
CHEST TIGHTNESS: 0
ABDOMINAL PAIN: 0
BLOOD IN STOOL: 0
SORE THROAT: 0
EYE DISCHARGE: 0
CONSTIPATION: 0
DIARRHEA: 0
ABDOMINAL DISTENTION: 0
VOMITING: 0
EYE PAIN: 0
FACIAL SWELLING: 0
WHEEZING: 0

## 2023-10-03 NOTE — PROGRESS NOTES
small   RCA and intermediate proximal circumflex disease. Patent LIMA to distal LAD. Occluded stent in mid RCA. Mild LV dilatation with probable mild LV systolic function (suboptimal   contrast injection). Recommendations      Medical management. Signatures      ----------------------------------------------------------------   Electronically signed by Shon King MD(Performing Physician) on   03/08/2021 21:16   ----------------------------------------------------------------         ASSESSMENT and PLAN:    Chronic coronary artery disease  Status post coronary bypass surgery  Status post diagnostic cardiac angiogram (2021)--patent LIMA to LAD, occluded intermediate proximal circumflex and occluded stent in mid RCA  Chronic stable angina  Lexiscan 6/2023: LVEF 36%. Large size, severe intensity fixed perfusion defect involving the inferior wall from base to apex with associated akinesis. Findings consistent with previous inferior wall infarct without evidence for reversible ischemia. Continue high intensity statin, carvedilol, Ranexa, amlodipine and aspirin. Aspirin dose decreased to 81 mg from full-strength due to excessive bruising. Nitroglycerin as needed added. May uptitrate Ranexa to 1000 mg twice daily. Congestive heart failure with systolic dysfunction  Ischemic cardiomyopathy  Medications reviewed and will optimize for antiheart failure benefit  Entresto 24-26 mg p.o. twice daily and spironolactone 25 mg daily added. Continue carvedilol 12.5 mg p.o. twice daily. If blood pressure unable to tolerate may discontinue amlodipine. Essential hypertension  Goal blood pressure less than 130/80    Dyslipidemia  Goal LDL less than 55              Electronically signed by Kiera Wall MD on 10/3/2023 at 11:01 AM    Kiera Wall MD, ANN MARIE, [facility] [place]  Noninvasive Cardiology Consultant    This dictation was generated by voice recognition computer software.   Although all attempts are made to edit the

## 2023-10-09 RX ORDER — SPIRONOLACTONE 25 MG/1
25 TABLET ORAL DAILY
Qty: 90 TABLET | Refills: 3 | Status: CANCELLED | OUTPATIENT
Start: 2023-10-09

## 2023-10-09 RX ORDER — NITROGLYCERIN 0.4 MG/1
0.4 TABLET SUBLINGUAL EVERY 5 MIN PRN
Qty: 25 TABLET | Refills: 3 | Status: CANCELLED | OUTPATIENT
Start: 2023-10-09

## 2023-10-10 RX ORDER — ASPIRIN 81 MG/1
81 TABLET, CHEWABLE ORAL DAILY
Qty: 90 TABLET | Refills: 3 | Status: SHIPPED | OUTPATIENT
Start: 2023-10-10

## 2023-10-10 RX ORDER — RANOLAZINE 500 MG/1
500 TABLET, EXTENDED RELEASE ORAL 2 TIMES DAILY
Qty: 180 TABLET | Refills: 3 | Status: SHIPPED | OUTPATIENT
Start: 2023-10-10

## 2023-10-10 RX ORDER — ATORVASTATIN CALCIUM 80 MG/1
80 TABLET, FILM COATED ORAL DAILY
Qty: 90 TABLET | Refills: 3 | Status: SHIPPED | OUTPATIENT
Start: 2023-10-10

## 2023-10-10 RX ORDER — AMLODIPINE BESYLATE 2.5 MG/1
2.5 TABLET ORAL DAILY
Qty: 90 TABLET | Refills: 3 | Status: SHIPPED | OUTPATIENT
Start: 2023-10-10

## 2023-11-21 ENCOUNTER — OFFICE VISIT (OUTPATIENT)
Dept: CARDIOLOGY CLINIC | Age: 78
End: 2023-11-21
Payer: MEDICARE

## 2023-11-21 VITALS
HEIGHT: 71 IN | HEART RATE: 67 BPM | BODY MASS INDEX: 30.38 KG/M2 | WEIGHT: 217 LBS | DIASTOLIC BLOOD PRESSURE: 80 MMHG | SYSTOLIC BLOOD PRESSURE: 130 MMHG

## 2023-11-21 DIAGNOSIS — I50.22 CHRONIC SYSTOLIC CONGESTIVE HEART FAILURE, NYHA CLASS 2 (HCC): Primary | ICD-10-CM

## 2023-11-21 DIAGNOSIS — Z95.1 HX OF CABG: ICD-10-CM

## 2023-11-21 DIAGNOSIS — I25.10 CORONARY ARTERY DISEASE INVOLVING NATIVE CORONARY ARTERY OF NATIVE HEART WITHOUT ANGINA PECTORIS: ICD-10-CM

## 2023-11-21 PROCEDURE — G8484 FLU IMMUNIZE NO ADMIN: HCPCS | Performed by: INTERNAL MEDICINE

## 2023-11-21 PROCEDURE — 1123F ACP DISCUSS/DSCN MKR DOCD: CPT | Performed by: INTERNAL MEDICINE

## 2023-11-21 PROCEDURE — 3075F SYST BP GE 130 - 139MM HG: CPT | Performed by: INTERNAL MEDICINE

## 2023-11-21 PROCEDURE — 99214 OFFICE O/P EST MOD 30 MIN: CPT | Performed by: INTERNAL MEDICINE

## 2023-11-21 PROCEDURE — 3079F DIAST BP 80-89 MM HG: CPT | Performed by: INTERNAL MEDICINE

## 2023-11-21 PROCEDURE — G8417 CALC BMI ABV UP PARAM F/U: HCPCS | Performed by: INTERNAL MEDICINE

## 2023-11-21 PROCEDURE — 1036F TOBACCO NON-USER: CPT | Performed by: INTERNAL MEDICINE

## 2023-11-21 PROCEDURE — G8427 DOCREV CUR MEDS BY ELIG CLIN: HCPCS | Performed by: INTERNAL MEDICINE

## 2023-11-21 ASSESSMENT — ENCOUNTER SYMPTOMS
VOMITING: 0
CHEST TIGHTNESS: 0
ABDOMINAL PAIN: 0
WHEEZING: 0
FACIAL SWELLING: 0
EYE REDNESS: 0
NAUSEA: 0
SHORTNESS OF BREATH: 0
SORE THROAT: 0
CONSTIPATION: 0
BLOOD IN STOOL: 0
COUGH: 0
APNEA: 0
ABDOMINAL DISTENTION: 0
EYE PAIN: 0
DIARRHEA: 0
EYE DISCHARGE: 0

## 2023-11-21 NOTE — PROGRESS NOTES
Cardiology Office Visit Note  [address] [place], [address] [place] [address]  Phone: [phone]  Fax: [phone]                            Date:  11/21/2023  Patient: Cecile Hicks  Age:  78 y.o., 1945    Referral: No ref. provider found    REASON FOR VISIT:  Follow-up         PROBLEM LIST:    Patient Active Problem List    Diagnosis Date Noted    Ischemic cardiomyopathy      Priority: High    History of COVID-19 03/29/2022    Hx of CABG 07/10/2018    Open wound of right foot 12/05/2017    Surgical wound dehiscence 10/17/2017    Class 2 obesity in adult 10/17/2017    Wound infection after surgery 10/17/2017    Open displaced fracture of fifth metatarsal bone 09/14/2017    CAD (coronary artery disease)      Overview Note:     4/03 CABG  7/31/18 Lexiscan EF 46%, Inferior Scar without Ischemia      Sleep apnea     Mixed hyperlipidemia     HTN (hypertension)          PRESENTATION: Cecile Hicks is a 78y.o. year old male who returns today for routine cardiology follow-up appointment. His past medical history is significant for severe ischemic cardiomyopathy with last known ejection fraction 25%. He has had coronary artery bypass surgery in the remote past (February 2003). An updated cardiac angiogram in March 2021 showed patent LIMA to LAD and chronic total occlusion of right coronary artery. At the last office appointment his medications were adjusted with the addition of Entresto which unfortunately he was unable to obtain due to cost.  Overall he reports that he is feeling okay. He no new shortness of breath, chest pain or worsening fatigue or lower extremity edema. REVIEW OF SYSTEMS:  Review of Systems   Constitutional:  Negative for chills, fatigue and fever. HENT:  Negative for congestion, facial swelling, hearing loss and sore throat. Eyes:  Negative for pain, discharge, redness and visual disturbance.    Respiratory:  Negative for apnea, cough, chest tightness, shortness of breath

## 2024-06-04 ENCOUNTER — OFFICE VISIT (OUTPATIENT)
Dept: GASTROENTEROLOGY | Facility: CLINIC | Age: 79
End: 2024-06-04
Payer: MEDICARE

## 2024-06-04 VITALS
TEMPERATURE: 97.5 F | WEIGHT: 198 LBS | OXYGEN SATURATION: 99 % | HEIGHT: 71 IN | HEART RATE: 75 BPM | SYSTOLIC BLOOD PRESSURE: 118 MMHG | BODY MASS INDEX: 27.72 KG/M2 | DIASTOLIC BLOOD PRESSURE: 66 MMHG

## 2024-06-04 DIAGNOSIS — Z86.010 HX OF COLONIC POLYPS: Primary | ICD-10-CM

## 2024-06-04 DIAGNOSIS — K92.1 BLACK STOOLS: ICD-10-CM

## 2024-06-04 RX ORDER — CARVEDILOL 12.5 MG/1
0.5 TABLET ORAL DAILY
COMMUNITY

## 2024-06-04 RX ORDER — MULTIPLE VITAMINS W/ MINERALS TAB 9MG-400MCG
1 TAB ORAL DAILY
COMMUNITY

## 2024-06-04 RX ORDER — OMEPRAZOLE 20 MG/1
1 CAPSULE, DELAYED RELEASE ORAL DAILY
COMMUNITY

## 2024-06-04 RX ORDER — BUSPIRONE HYDROCHLORIDE 10 MG/1
10 TABLET ORAL NIGHTLY
COMMUNITY
Start: 2024-05-15

## 2024-06-04 RX ORDER — PANTOPRAZOLE SODIUM 40 MG/1
1 TABLET, DELAYED RELEASE ORAL DAILY
COMMUNITY
Start: 2024-05-15 | End: 2024-06-14

## 2024-06-04 NOTE — PROGRESS NOTES
Primary Physician: Kurt Pichardo DO    Chief Complaint   Patient presents with    Black or Bloody Stool     Pt c/o black stools for the last couple of months but has been taking Pepto due to loose stools; Pt's last colon was 7/31/2019-personal history of adenomatous and hyperplastic polyps       Subjective     Francisco Sinclair is a 78 y.o. male.    HPI  Black Stools  2-3 months he has had black stools.  Pt buried his wife 2 weeks ago and he took care of his wife for 5 months as his wife was dying and he was her caregiver. He has been under a lot of stress.    He denies any abdominal pain. No nausea.      History of adenomatous colon polyps  7/31/2019 colonoscopy with 2 polyps of the transverse colon, multiple small mouth diverticula of the left colon. 5 year recall.  No BRRB. No change in bowel habits. No diarrhea, constipation, abdominal pain or bright red blood per rectum.      Past Medical History:   Diagnosis Date    CAD (coronary artery disease)     Diverticulosis     GERD (gastroesophageal reflux disease)     History of adenomatous polyp of colon     History of colon polyps     Hyperlipidemia     Hypertension        Past Surgical History:   Procedure Laterality Date    CARDIAC CATHETERIZATION      COLONOSCOPY  08/04/2014    Diverticulosis; Questionable polyp on the ileocecal valve-path showed fragments of benign small intestinal mucosa with submucosa, no adenomatous changes identified; One 3mm adenomatous polyp in the ascending colon; One 6mm adenomatous polyp in the hepatic flexure; Repeat 5 years    COLONOSCOPY  07/14/2010    1cm hyperplastic polyp in the distal transverse colon; Diverticulosis; Repeat 4-5 years    COLONOSCOPY  06/13/2007    Diverticulosis; Internal hemorrhoids; 7mm hyperplastic rectal polyp; Repeat 3 years    COLONOSCOPY N/A 07/31/2019    Two 4mm tubular adenomatous polyps in the transverse colon; Diverticulosis in the left colon; Repeat 5 years    CORONARY ARTERY BYPASS GRAFT  2003     [place], [place]    FOOT SURGERY          Current Outpatient Medications:     amLODIPine (NORVASC) 5 MG tablet, Take 1 tablet by mouth Daily., Disp: , Rfl:     Aspirin 81 MG capsule, Take 81 mg by mouth Daily., Disp: , Rfl:     atorvastatin (LIPITOR) 80 MG tablet, Take 0.5 tablets by mouth Every Night., Disp: , Rfl:     busPIRone (BUSPAR) 10 MG tablet, Take 1 tablet by mouth Every Night., Disp: , Rfl:     carvedilol (COREG) 12.5 MG tablet, Take 0.5 tablets by mouth Daily., Disp: , Rfl:     Coenzyme Q10 (Co Q 10) 100 MG capsule, Take 200 mg by mouth Daily., Disp: , Rfl:     dipyridamole (PERSANTINE) 75 MG tablet, Take 1 tablet by mouth 3 (Three) Times a Day., Disp: , Rfl:     lisinopril (PRINIVIL,ZESTRIL) 10 MG tablet, Take 1 tablet by mouth Daily., Disp: , Rfl:     multivitamin with minerals (Multivitamin Adults) tablet tablet, Take 1 tablet by mouth Daily., Disp: , Rfl:     Omega-3 Fatty Acids (FISH OIL) 1000 MG capsule capsule, Take 1 capsule by mouth Daily With Breakfast., Disp: , Rfl:     omeprazole (priLOSEC) 20 MG capsule, Take 1 capsule by mouth Daily., Disp: , Rfl:     pantoprazole (PROTONIX) 40 MG EC tablet, Take 1 tablet by mouth Daily., Disp: , Rfl:     ranolazine (RANEXA) 500 MG 12 hr tablet, Take 1 tablet by mouth 2 (Two) Times a Day., Disp: , Rfl:     Allergies   Allergen Reactions    Rosuvastatin Myalgia     Muscle spasms       Social History     Socioeconomic History    Marital status:    Tobacco Use    Smoking status: Former     Current packs/day: 0.00     Types: Cigarettes     Quit date: 2003     Years since quittin.9    Smokeless tobacco: Never   Vaping Use    Vaping status: Never Used   Substance and Sexual Activity    Alcohol use: No    Drug use: Defer    Sexual activity: Defer       Family History   Problem Relation Age of Onset    Colon cancer Neg Hx     Colon polyps Neg Hx     Esophageal cancer Neg Hx     Liver cancer Neg Hx     Liver disease Neg Hx     Rectal cancer Neg Hx      "Stomach cancer Neg Hx        Review of Systems   Respiratory:  Positive for shortness of breath.    Gastrointestinal:  Negative for abdominal pain, constipation, diarrhea and nausea.       Objective     /66 (BP Location: Left arm, Patient Position: Sitting, Cuff Size: Adult)   Pulse 75   Temp 97.5 °F (36.4 °C) (Infrared)   Ht 180.3 cm (71\")   Wt 89.8 kg (198 lb)   SpO2 99%   BMI 27.62 kg/m²     Physical Exam  Vitals reviewed.   Constitutional:       Appearance: Normal appearance.   Cardiovascular:      Rate and Rhythm: Normal rate and regular rhythm.      Heart sounds: Normal heart sounds.   Pulmonary:      Breath sounds: Normal breath sounds.   Abdominal:      General: Abdomen is flat. There is no distension.      Palpations: Abdomen is soft.      Tenderness: There is no abdominal tenderness.   Neurological:      Mental Status: He is alert.                 IMPRESSION/PLAN:    Assessment & Plan      Problem List Items Addressed This Visit       Hx of colonic polyps - Primary    Overview     7/31/2019 colonoscopy with 2 polyps of the transverse colon, multiple small mouth diverticula of the left colon. 5 year recall.         Current Assessment & Plan     Plan Colonoscopy per Dr Oliva         Relevant Orders    Case Request (Completed)    Black stools    Overview     2-3 months of black stools associated with high stress with the recent death of his wife. No abd pain and No nausea.         Current Assessment & Plan     Plan Endoscopy per Dr Oliva  Continue Pantoprazole 40mg daily (possibly add Carafate after results of endoscopy)         Relevant Orders    Case Request (Completed)     Endoscopy and Colonoscopy per Dr Oliva            ..The risks, benefits, and alternatives of colonoscopy were reviewed with the patient today.  Risks including perforation of the colon possibly requiring surgery or colostomy.  Additional risks include risk of bleeding from biopsies or removal of colon tissue.  There is also the " risk of a drug reaction or problems with anesthesia.  This will be discussed with the further by the anesthesia team on the day of the procedure.  Lastly there is a possibility of missing a colon polyp or cancer.  The benefits include the diagnosis and management of disease of the colon and rectum.  Alternatives to colonoscopy include barium enema, laboratory testing, radiographic evaluation, or no intervention.  The patient verbalizes understanding and agrees.    In accordance with requirements under the Affordable Care Act, [facility] has provided pricing for all hospital services and items on each of its websites. However, a patient's actual cost may differ based on the services the patient receives to meet individual healthcare needs and based on the benefits provided under the patient’s insurance coverage.        Nancy Bolanos, APRN  06/04/24  22:39 CDT    Part of this note may be an electronic transcription/translation of spoken language to printed text.

## 2024-06-04 NOTE — ASSESSMENT & PLAN NOTE
Plan Endoscopy per Dr Oliva  Continue Pantoprazole 40mg daily (possibly add Carafate after results of endoscopy)

## 2024-06-04 NOTE — H&P (VIEW-ONLY)
Primary Physician: Kurt Pichardo DO    Chief Complaint   Patient presents with    Black or Bloody Stool     Pt c/o black stools for the last couple of months but has been taking Pepto due to loose stools; Pt's last colon was 7/31/2019-personal history of adenomatous and hyperplastic polyps       Subjective     Francisco Sinclair is a 78 y.o. male.    HPI  Black Stools  2-3 months he has had black stools.  Pt buried his wife 2 weeks ago and he took care of his wife for 5 months as his wife was dying and he was her caregiver. He has been under a lot of stress.    He denies any abdominal pain. No nausea.      History of adenomatous colon polyps  7/31/2019 colonoscopy with 2 polyps of the transverse colon, multiple small mouth diverticula of the left colon. 5 year recall.  No BRRB. No change in bowel habits. No diarrhea, constipation, abdominal pain or bright red blood per rectum.      Past Medical History:   Diagnosis Date    CAD (coronary artery disease)     Diverticulosis     GERD (gastroesophageal reflux disease)     History of adenomatous polyp of colon     History of colon polyps     Hyperlipidemia     Hypertension        Past Surgical History:   Procedure Laterality Date    CARDIAC CATHETERIZATION      COLONOSCOPY  08/04/2014    Diverticulosis; Questionable polyp on the ileocecal valve-path showed fragments of benign small intestinal mucosa with submucosa, no adenomatous changes identified; One 3mm adenomatous polyp in the ascending colon; One 6mm adenomatous polyp in the hepatic flexure; Repeat 5 years    COLONOSCOPY  07/14/2010    1cm hyperplastic polyp in the distal transverse colon; Diverticulosis; Repeat 4-5 years    COLONOSCOPY  06/13/2007    Diverticulosis; Internal hemorrhoids; 7mm hyperplastic rectal polyp; Repeat 3 years    COLONOSCOPY N/A 07/31/2019    Two 4mm tubular adenomatous polyps in the transverse colon; Diverticulosis in the left colon; Repeat 5 years    CORONARY ARTERY BYPASS GRAFT  2003     [place], [place]    FOOT SURGERY          Current Outpatient Medications:     amLODIPine (NORVASC) 5 MG tablet, Take 1 tablet by mouth Daily., Disp: , Rfl:     Aspirin 81 MG capsule, Take 81 mg by mouth Daily., Disp: , Rfl:     atorvastatin (LIPITOR) 80 MG tablet, Take 0.5 tablets by mouth Every Night., Disp: , Rfl:     busPIRone (BUSPAR) 10 MG tablet, Take 1 tablet by mouth Every Night., Disp: , Rfl:     carvedilol (COREG) 12.5 MG tablet, Take 0.5 tablets by mouth Daily., Disp: , Rfl:     Coenzyme Q10 (Co Q 10) 100 MG capsule, Take 200 mg by mouth Daily., Disp: , Rfl:     dipyridamole (PERSANTINE) 75 MG tablet, Take 1 tablet by mouth 3 (Three) Times a Day., Disp: , Rfl:     lisinopril (PRINIVIL,ZESTRIL) 10 MG tablet, Take 1 tablet by mouth Daily., Disp: , Rfl:     multivitamin with minerals (Multivitamin Adults) tablet tablet, Take 1 tablet by mouth Daily., Disp: , Rfl:     Omega-3 Fatty Acids (FISH OIL) 1000 MG capsule capsule, Take 1 capsule by mouth Daily With Breakfast., Disp: , Rfl:     omeprazole (priLOSEC) 20 MG capsule, Take 1 capsule by mouth Daily., Disp: , Rfl:     pantoprazole (PROTONIX) 40 MG EC tablet, Take 1 tablet by mouth Daily., Disp: , Rfl:     ranolazine (RANEXA) 500 MG 12 hr tablet, Take 1 tablet by mouth 2 (Two) Times a Day., Disp: , Rfl:     Allergies   Allergen Reactions    Rosuvastatin Myalgia     Muscle spasms       Social History     Socioeconomic History    Marital status:    Tobacco Use    Smoking status: Former     Current packs/day: 0.00     Types: Cigarettes     Quit date: 2003     Years since quittin.9    Smokeless tobacco: Never   Vaping Use    Vaping status: Never Used   Substance and Sexual Activity    Alcohol use: No    Drug use: Defer    Sexual activity: Defer       Family History   Problem Relation Age of Onset    Colon cancer Neg Hx     Colon polyps Neg Hx     Esophageal cancer Neg Hx     Liver cancer Neg Hx     Liver disease Neg Hx     Rectal cancer Neg Hx      "Stomach cancer Neg Hx        Review of Systems   Respiratory:  Positive for shortness of breath.    Gastrointestinal:  Negative for abdominal pain, constipation, diarrhea and nausea.       Objective     /66 (BP Location: Left arm, Patient Position: Sitting, Cuff Size: Adult)   Pulse 75   Temp 97.5 °F (36.4 °C) (Infrared)   Ht 180.3 cm (71\")   Wt 89.8 kg (198 lb)   SpO2 99%   BMI 27.62 kg/m²     Physical Exam  Vitals reviewed.   Constitutional:       Appearance: Normal appearance.   Cardiovascular:      Rate and Rhythm: Normal rate and regular rhythm.      Heart sounds: Normal heart sounds.   Pulmonary:      Breath sounds: Normal breath sounds.   Abdominal:      General: Abdomen is flat. There is no distension.      Palpations: Abdomen is soft.      Tenderness: There is no abdominal tenderness.   Neurological:      Mental Status: He is alert.                 IMPRESSION/PLAN:    Assessment & Plan      Problem List Items Addressed This Visit       Hx of colonic polyps - Primary    Overview     7/31/2019 colonoscopy with 2 polyps of the transverse colon, multiple small mouth diverticula of the left colon. 5 year recall.         Current Assessment & Plan     Plan Colonoscopy per Dr Oliva         Relevant Orders    Case Request (Completed)    Black stools    Overview     2-3 months of black stools associated with high stress with the recent death of his wife. No abd pain and No nausea.         Current Assessment & Plan     Plan Endoscopy per Dr Oliva  Continue Pantoprazole 40mg daily (possibly add Carafate after results of endoscopy)         Relevant Orders    Case Request (Completed)     Endoscopy and Colonoscopy per Dr Oliva            ..The risks, benefits, and alternatives of colonoscopy were reviewed with the patient today.  Risks including perforation of the colon possibly requiring surgery or colostomy.  Additional risks include risk of bleeding from biopsies or removal of colon tissue.  There is also the " risk of a drug reaction or problems with anesthesia.  This will be discussed with the further by the anesthesia team on the day of the procedure.  Lastly there is a possibility of missing a colon polyp or cancer.  The benefits include the diagnosis and management of disease of the colon and rectum.  Alternatives to colonoscopy include barium enema, laboratory testing, radiographic evaluation, or no intervention.  The patient verbalizes understanding and agrees.    In accordance with requirements under the Affordable Care Act, [facility] has provided pricing for all hospital services and items on each of its websites. However, a patient's actual cost may differ based on the services the patient receives to meet individual healthcare needs and based on the benefits provided under the patient’s insurance coverage.        Nancy Bolanos, APRN  06/04/24  22:39 CDT    Part of this note may be an electronic transcription/translation of spoken language to printed text.

## 2024-06-07 ENCOUNTER — ANESTHESIA EVENT (OUTPATIENT)
Dept: GASTROENTEROLOGY | Facility: HOSPITAL | Age: 79
End: 2024-06-07
Payer: MEDICARE

## 2024-06-07 ENCOUNTER — HOSPITAL ENCOUNTER (OUTPATIENT)
Facility: HOSPITAL | Age: 79
Setting detail: HOSPITAL OUTPATIENT SURGERY
Discharge: HOME | End: 2024-06-07
Attending: INTERNAL MEDICINE | Admitting: INTERNAL MEDICINE
Payer: MEDICARE

## 2024-06-07 ENCOUNTER — ANESTHESIA (OUTPATIENT)
Dept: GASTROENTEROLOGY | Facility: HOSPITAL | Age: 79
End: 2024-06-07
Payer: MEDICARE

## 2024-06-07 VITALS
RESPIRATION RATE: 18 BRPM | DIASTOLIC BLOOD PRESSURE: 71 MMHG | WEIGHT: 194 LBS | HEIGHT: 71 IN | SYSTOLIC BLOOD PRESSURE: 118 MMHG | BODY MASS INDEX: 27.16 KG/M2 | HEART RATE: 79 BPM | TEMPERATURE: 97.7 F | OXYGEN SATURATION: 99 %

## 2024-06-07 DIAGNOSIS — K92.1 BLACK STOOLS: ICD-10-CM

## 2024-06-07 DIAGNOSIS — Z86.010 HX OF COLONIC POLYPS: ICD-10-CM

## 2024-06-07 PROCEDURE — 45385 COLONOSCOPY W/LESION REMOVAL: CPT | Performed by: INTERNAL MEDICINE

## 2024-06-07 PROCEDURE — 43239 EGD BIOPSY SINGLE/MULTIPLE: CPT | Performed by: INTERNAL MEDICINE

## 2024-06-07 PROCEDURE — 25810000003 SODIUM CHLORIDE 0.9 % SOLUTION: Performed by: NURSE ANESTHETIST, CERTIFIED REGISTERED

## 2024-06-07 PROCEDURE — 25010000002 PROPOFOL 10 MG/ML EMULSION: Performed by: NURSE ANESTHETIST, CERTIFIED REGISTERED

## 2024-06-07 PROCEDURE — 88305 TISSUE EXAM BY PATHOLOGIST: CPT | Performed by: INTERNAL MEDICINE

## 2024-06-07 RX ORDER — SODIUM CHLORIDE 0.9 % (FLUSH) 0.9 %
10 SYRINGE (ML) INJECTION EVERY 12 HOURS SCHEDULED
Status: CANCELLED | OUTPATIENT
Start: 2024-06-07

## 2024-06-07 RX ORDER — PROPOFOL 10 MG/ML
VIAL (ML) INTRAVENOUS AS NEEDED
Status: DISCONTINUED | OUTPATIENT
Start: 2024-06-07 | End: 2024-06-07 | Stop reason: SURG

## 2024-06-07 RX ORDER — SODIUM CHLORIDE 0.9 % (FLUSH) 0.9 %
10 SYRINGE (ML) INJECTION AS NEEDED
Status: DISCONTINUED | OUTPATIENT
Start: 2024-06-07 | End: 2024-06-07 | Stop reason: HOSPADM

## 2024-06-07 RX ORDER — SODIUM CHLORIDE 0.9 % (FLUSH) 0.9 %
10 SYRINGE (ML) INJECTION AS NEEDED
Status: CANCELLED | OUTPATIENT
Start: 2024-06-07

## 2024-06-07 RX ORDER — SODIUM CHLORIDE 9 MG/ML
500 INJECTION, SOLUTION INTRAVENOUS CONTINUOUS PRN
Status: DISCONTINUED | OUTPATIENT
Start: 2024-06-07 | End: 2024-06-07 | Stop reason: HOSPADM

## 2024-06-07 RX ORDER — SODIUM CHLORIDE 9 MG/ML
100 INJECTION, SOLUTION INTRAVENOUS CONTINUOUS
Status: CANCELLED | OUTPATIENT
Start: 2024-06-07

## 2024-06-07 RX ORDER — LIDOCAINE HYDROCHLORIDE 10 MG/ML
0.5 INJECTION, SOLUTION EPIDURAL; INFILTRATION; INTRACAUDAL; PERINEURAL ONCE AS NEEDED
Status: DISCONTINUED | OUTPATIENT
Start: 2024-06-07 | End: 2024-06-07 | Stop reason: HOSPADM

## 2024-06-07 RX ORDER — SODIUM CHLORIDE 9 MG/ML
40 INJECTION, SOLUTION INTRAVENOUS AS NEEDED
Status: CANCELLED | OUTPATIENT
Start: 2024-06-07

## 2024-06-07 RX ORDER — LIDOCAINE HYDROCHLORIDE 20 MG/ML
INJECTION, SOLUTION EPIDURAL; INFILTRATION; INTRACAUDAL; PERINEURAL AS NEEDED
Status: DISCONTINUED | OUTPATIENT
Start: 2024-06-07 | End: 2024-06-07 | Stop reason: SURG

## 2024-06-07 RX ADMIN — SODIUM CHLORIDE 500 ML: 9 INJECTION, SOLUTION INTRAVENOUS at 12:33

## 2024-06-07 RX ADMIN — PROPOFOL 290 MG: 10 INJECTION, EMULSION INTRAVENOUS at 12:43

## 2024-06-07 RX ADMIN — LIDOCAINE HYDROCHLORIDE 50 MG: 20 INJECTION, SOLUTION EPIDURAL; INFILTRATION; INTRACAUDAL; PERINEURAL at 12:43

## 2024-06-07 NOTE — INTERVAL H&P NOTE
H&P updated. The patient was examined and the following changes are noted:        Patient had been using Pepto-Bismol.

## 2024-06-07 NOTE — ANESTHESIA POSTPROCEDURE EVALUATION
"Patient: Francisco Sinclair    Procedure Summary       Date: 06/07/24 Room / Location: [facility] ENDOSCOPY 6 / [facility] ENDOS[facility]    Anesthesia Start: 1239 Anesthesia Stop: 1322    Procedures:       ESOPHAGOGASTRODUODENOSCOPY WITH ANESTHESIA      COLONOSCOPY WITH ANESTHESIA Diagnosis:       Hx of colonic polyps      Black stools      (Hx of colonic polyps [Z86.010])      (Black stools [K92.1])    Surgeons: Ana Oliva MD Provider: Jeremy Vega CRNA    Anesthesia Type: MAC ASA Status: 3            Anesthesia Type: MAC    Vitals  Vitals Value Taken Time   /61 06/07/24 1321   Temp     Pulse 83 06/07/24 1322   Resp 15 06/07/24 1320   SpO2 100 % 06/07/24 1322   Vitals shown include unfiled device data.        Post Anesthesia Care and Evaluation    Patient location during evaluation: PACU  Patient participation: complete - patient participated  Level of consciousness: awake and alert  Pain management: adequate    Airway patency: patent  Anesthetic complications: No anesthetic complications    Cardiovascular status: acceptable  Respiratory status: acceptable  Hydration status: acceptable    Comments: Blood pressure 105/61, pulse 79, temperature 97.7 °F (36.5 °C), temperature source Temporal, resp. rate 15, height 180.3 cm (71\"), weight 88 kg (194 lb), SpO2 99%.    Pt discharged from PACU based on mica score >8    "

## 2024-06-07 NOTE — ANESTHESIA PREPROCEDURE EVALUATION
Anesthesia Evaluation     Patient summary reviewed   no history of anesthetic complications:   NPO Solid Status: > 8 hours             Airway   Mallampati: II  TM distance: >3 FB  Neck ROM: full  Dental    (+) upper dentures and lower dentures    Pulmonary    (+) ,sleep apnea  Cardiovascular   Exercise tolerance: excellent (>7 METS)    (+) hypertension, CAD, CABG, cardiac stents , hyperlipidemia      Neuro/Psych- negative ROS  GI/Hepatic/Renal/Endo    (+) obesity, GERD    Musculoskeletal     Abdominal    Substance History      OB/GYN          Other                          Anesthesia Plan    ASA 3     MAC     (Aspirin today )    Anesthetic plan, risks, benefits, and alternatives have been provided, discussed and informed consent has been obtained with: patient.      
Detail Level: Detailed

## 2024-06-10 LAB
CYTO UR: NORMAL
LAB AP CASE REPORT: NORMAL
Lab: NORMAL
PATH REPORT.FINAL DX SPEC: NORMAL
PATH REPORT.GROSS SPEC: NORMAL

## 2024-06-27 ENCOUNTER — TELEPHONE (OUTPATIENT)
Dept: CARDIOLOGY CLINIC | Age: 79
End: 2024-06-27

## 2024-06-27 ENCOUNTER — OFFICE VISIT (OUTPATIENT)
Dept: CARDIOLOGY CLINIC | Age: 79
End: 2024-06-27
Payer: MEDICARE

## 2024-06-27 VITALS
HEIGHT: 71 IN | DIASTOLIC BLOOD PRESSURE: 84 MMHG | HEART RATE: 72 BPM | WEIGHT: 198 LBS | BODY MASS INDEX: 27.72 KG/M2 | SYSTOLIC BLOOD PRESSURE: 128 MMHG

## 2024-06-27 DIAGNOSIS — I25.5 ISCHEMIC CARDIOMYOPATHY: ICD-10-CM

## 2024-06-27 DIAGNOSIS — Z95.1 HX OF CABG: ICD-10-CM

## 2024-06-27 DIAGNOSIS — I50.22 CHRONIC SYSTOLIC CONGESTIVE HEART FAILURE, NYHA CLASS 2 (HCC): ICD-10-CM

## 2024-06-27 DIAGNOSIS — R00.2 PALPITATIONS: ICD-10-CM

## 2024-06-27 DIAGNOSIS — I25.10 CORONARY ARTERY DISEASE INVOLVING NATIVE CORONARY ARTERY OF NATIVE HEART WITHOUT ANGINA PECTORIS: Primary | ICD-10-CM

## 2024-06-27 PROCEDURE — 93000 ELECTROCARDIOGRAM COMPLETE: CPT | Performed by: INTERNAL MEDICINE

## 2024-06-27 PROCEDURE — 1036F TOBACCO NON-USER: CPT | Performed by: INTERNAL MEDICINE

## 2024-06-27 PROCEDURE — G8427 DOCREV CUR MEDS BY ELIG CLIN: HCPCS | Performed by: INTERNAL MEDICINE

## 2024-06-27 PROCEDURE — 99214 OFFICE O/P EST MOD 30 MIN: CPT | Performed by: INTERNAL MEDICINE

## 2024-06-27 PROCEDURE — 1123F ACP DISCUSS/DSCN MKR DOCD: CPT | Performed by: INTERNAL MEDICINE

## 2024-06-27 PROCEDURE — 3074F SYST BP LT 130 MM HG: CPT | Performed by: INTERNAL MEDICINE

## 2024-06-27 PROCEDURE — 3079F DIAST BP 80-89 MM HG: CPT | Performed by: INTERNAL MEDICINE

## 2024-06-27 PROCEDURE — G8417 CALC BMI ABV UP PARAM F/U: HCPCS | Performed by: INTERNAL MEDICINE

## 2024-06-27 RX ORDER — BUSPIRONE HYDROCHLORIDE 10 MG/1
10 TABLET ORAL DAILY
COMMUNITY

## 2024-06-27 RX ORDER — AMLODIPINE BESYLATE 5 MG/1
5 TABLET ORAL DAILY
COMMUNITY

## 2024-06-27 NOTE — PROGRESS NOTES
[facility]                            Date:  6/27/2024  Patient: Felipe Tierney  Age:  78 y.o., 1945      Reason for evaluation:         SUBJECTIVE:    Seen today for follow-up assessment.  I recently took care of his wife during an extended illness she passed away last month.  They wish to switch to my care having previously seen Dr. Lee.  He has a history of coronary disease remote CABG.  He has recently been doing well from a cardiac standpoint.  Had a stress test a few years ago.  Remains moderately active denies anginal chest pain dyspnea palpitations or claudication.  Tolerating all medications well.  Blood pressure 128/8472.  He does not smoke.  Recent LDL cholesterol 61 on 4/15/2021 his primary care checks these and will he will bring his most recent labs on his next follow-up visit.  Lexiscan 6/29/2023 ejection fraction 36% large size is severe intensity fixed perfusion defect inferior wall from the base to the apex with associated akinesis.  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  Catheterization 3/8/2021 reviewed.  Multivessel disease severe proximal ID stenosis patent LIMA graft to distal LAD occluded stent in mid right coronary artery mild LV dilatation probable mild LV systolic dysfunction.    Review of Systems      OBJECTIVE:    /84   Pulse 72   Ht 1.803 m (5' 11\")   Wt 89.8 kg (198 lb)   BMI 27.62 kg/m²     Labs:   CBC: No results for input(s): \"WBC\", \"HGB\", \"HCT\", \"PLT\" in the last 72 hours.  BMP:No results for input(s): \"NA\", \"K\", \"CO2\", \"BUN\", \"CREATININE\", \"LABGLOM\", \"GLUCOSE\" in the last 72 hours.  BNP: No results for input(s): \"BNP\" in the last 72 hours.  PT/INR: No results for input(s): \"PROTIME\", \"INR\" in the last 72 hours.  APTT:No results for input(s): \"APTT\" in the last 72 hours.  CARDIAC ENZYMES:No results for input(s): \"CKTOTAL\", \"CKMB\", \"CKMBINDEX\", \"TROPONINI\" in the last 72 hours.  FASTING LIPID PANEL:  Lab Results

## 2024-06-27 NOTE — TELEPHONE ENCOUNTER
Called pt and unable to reach pt regarding canceling his 3 month FU appt and extending it out to 6 months. Went ahead and rescheduled the FU appt to 01/02/25 with Dr. Matthews. Will call pt later today to get ahold of him and update him on these changes. 06/27/24 SL

## 2024-10-15 ENCOUNTER — TRANSCRIBE ORDERS (OUTPATIENT)
Dept: ADMINISTRATIVE | Facility: HOSPITAL | Age: 79
End: 2024-10-15
Payer: MEDICARE

## 2024-10-15 DIAGNOSIS — R39.11 URINARY HESITANCY: ICD-10-CM

## 2024-10-15 DIAGNOSIS — R31.0 GROSS HEMATURIA: Primary | ICD-10-CM

## 2024-10-24 NOTE — PROGRESS NOTES
Subjective    Mr. Sinclair is 79 y.o. male    Chief Complaint: Gross Hematuria     History of Present Illness  Patient referred by his PCP for urinary symptoms as well as abnormal CT scan.  Patient presented for office visit also said he was having some worsening urinary hesitancy and had seen or had passed blood in his urine that morning of his appointment.  Patient states he is also seeing some blood on his underwear.  He has not seen any blood since that last episode.  Which was around 10/15/2024.  CT abdomen pelvis with contrast was ordered it shows abnormal prostate described as a 2.4 cm area of abnormal appearing soft tissue extending from the posterior aspect of the prostate gland inseparable from the rectal wall.  It does appear to be suspicious for prostate malignancy however I do not see any other obvious evidence of metastasis.  PSA was 7.3 I was not able to find previous PSA for comparison.  He was placed on tamsulosin and states this is helped his voiding symptoms considerably.  Patient denies any other pain or symptoms at this time.      The following portions of the patient's history were reviewed and updated as appropriate: allergies, current medications, past family history, past medical history, past social history, past surgical history and problem list.    Review of Systems   Constitutional: Negative.    Gastrointestinal: Negative.    Genitourinary: Negative.          Current Outpatient Medications:     amLODIPine (NORVASC) 5 MG tablet, Take 1 tablet by mouth Daily., Disp: , Rfl:     Aspirin 81 MG capsule, Take 81 mg by mouth Daily., Disp: , Rfl:     atorvastatin (LIPITOR) 80 MG tablet, Take 0.5 tablets by mouth Every Night., Disp: , Rfl:     busPIRone (BUSPAR) 10 MG tablet, Take 1 tablet by mouth Every Night., Disp: , Rfl:     carvedilol (COREG) 12.5 MG tablet, Take 0.5 tablets by mouth Daily., Disp: , Rfl:     Coenzyme Q10 (Co Q 10) 100 MG capsule, Take 200 mg by mouth Daily., Disp: , Rfl:      dipyridamole (PERSANTINE) 75 MG tablet, Take 1 tablet by mouth 3 (Three) Times a Day., Disp: , Rfl:     Flomax 0.4 MG capsule 24 hr capsule, 1 capsule Daily., Disp: , Rfl:     lisinopril (PRINIVIL,ZESTRIL) 10 MG tablet, Take 1 tablet by mouth Daily., Disp: , Rfl:     multivitamin with minerals (Multivitamin Adults) tablet tablet, Take 1 tablet by mouth Daily., Disp: , Rfl:     Omega-3 Fatty Acids (FISH OIL) 1000 MG capsule capsule, Take 1 capsule by mouth Daily With Breakfast., Disp: , Rfl:     pantoprazole (PROTONIX) 40 MG EC tablet, Take 1 tablet by mouth Daily., Disp: , Rfl:     ranolazine (RANEXA) 500 MG 12 hr tablet, Take 1 tablet by mouth 2 (Two) Times a Day., Disp: , Rfl:     Past Medical History:   Diagnosis Date    CAD (coronary artery disease)     Diverticulosis     GERD (gastroesophageal reflux disease)     History of adenomatous polyp of colon     History of colon polyps     Hyperlipidemia     Hypertension        Past Surgical History:   Procedure Laterality Date    CARDIAC CATHETERIZATION      COLONOSCOPY  08/04/2014    Diverticulosis; Questionable polyp on the ileocecal valve-path showed fragments of benign small intestinal mucosa with submucosa, no adenomatous changes identified; One 3mm adenomatous polyp in the ascending colon; One 6mm adenomatous polyp in the hepatic flexure; Repeat 5 years    COLONOSCOPY  07/14/2010    1cm hyperplastic polyp in the distal transverse colon; Diverticulosis; Repeat 4-5 years    COLONOSCOPY  06/13/2007    Diverticulosis; Internal hemorrhoids; 7mm hyperplastic rectal polyp; Repeat 3 years    COLONOSCOPY N/A 07/31/2019    Two 4mm tubular adenomatous polyps in the transverse colon; Diverticulosis in the left colon; Repeat 5 years    COLONOSCOPY N/A 6/7/2024    Procedure: COLONOSCOPY WITH ANESTHESIA;  Surgeon: Ana Oliva MD;  Location: [facility] ENDOSCOPY;  Service: Gastroenterology;  Laterality: N/A;  preop; hx of polyps  postop diverticulosis; polyps   PCP Kurt Pichardo  "   CORONARY ARTERY BYPASS GRAFT      [place], [place]    ENDOSCOPY N/A 2024    Procedure: ESOPHAGOGASTRODUODENOSCOPY WITH ANESTHESIA;  Surgeon: Ana Oliva MD;  Location: [facility] ENDOSCOPY;  Service: Gastroenterology;  Laterality: N/A;  preop; black stools;   postop  PCP Kurt Pichardo    FOOT SURGERY         Social History     Socioeconomic History    Marital status:    Tobacco Use    Smoking status: Former     Current packs/day: 0.00     Types: Cigarettes     Quit date: 2003     Years since quittin.3    Smokeless tobacco: Never   Vaping Use    Vaping status: Never Used   Substance and Sexual Activity    Alcohol use: No    Drug use: Defer    Sexual activity: Defer       Family History   Problem Relation Age of Onset    Colon cancer Neg Hx     Colon polyps Neg Hx     Esophageal cancer Neg Hx     Liver cancer Neg Hx     Liver disease Neg Hx     Rectal cancer Neg Hx     Stomach cancer Neg Hx        Objective    Temp 97.2 °F (36.2 °C)   Ht 180.3 cm (71\")   Wt 88.9 kg (196 lb)   BMI 27.34 kg/m²     Physical Exam  Vitals reviewed.   Constitutional:       General: He is not in acute distress.     Appearance: Normal appearance. He is not toxic-appearing.   HENT:      Head: Normocephalic and atraumatic.   Pulmonary:      Effort: Pulmonary effort is normal.   Genitourinary:     Rectum: Normal.      Comments: Grossly abnormal prostate [place] and [place] with palpation suspicious for prostate cancer.  Skin:     Coloration: Skin is not pale.   Neurological:      Mental Status: He is alert.   Psychiatric:         Mood and Affect: Mood normal.         Behavior: Behavior normal.             Results for orders placed or performed in visit on 10/30/24   POC Urinalysis Dipstick, Multipro    Collection Time: 10/30/24  8:38 AM    Specimen: Urine   Result Value Ref Range    Color Aliza Yellow, Straw, Dark Yellow, Aliza    Clarity, UA Clear Clear    Glucose, UA Negative Negative mg/dL "    Bilirubin Negative Negative    Ketones, UA Negative Negative    Specific Gravity  1.030 1.005 - 1.030    Blood, UA Large (A) Negative    pH, Urine 6.0 5.0 - 8.0    Protein, POC 30 mg/dL (A) Negative mg/dL    Urobilinogen, UA Normal Normal, 0.2 E.U./dL    Nitrite, UA Negative Negative    Leukocytes Negative Negative     Assessment and Plan    Diagnoses and all orders for this visit:    1. Gross hematuria (Primary)  -     POC Urinalysis Dipstick, Multipro  -     Non-gynecologic Cytology    2. Elevated prostate specific antigen (PSA)  -     MRI Pelvis With & Without Contrast; Future    3. Abnormal CT scan  -     MRI Pelvis With & Without Contrast; Future    4. Abnormal digital rectal exam  -     MRI Pelvis With & Without Contrast; Future      Regarding the abnormal CT scan it is abnormal finding associated with the prostate given the digital rectal exam is also grossly abnormal and suspicious for prostate cancer I will would like to schedule patient for MRI of the prostate.    Regarding his urinary hesitancy symptoms he was having prior to seeing his primary care physician after starting tamsulosin his symptoms have improved.      Regarding the hematuria he had a 1 episode when voiding and then saw some urine in the underwear but otherwise has not seen since.  Will need cystoscopy at some point but given the abnormal findings of the prostate feel this needs to be prioritized.

## 2024-10-29 ENCOUNTER — HOSPITAL ENCOUNTER (OUTPATIENT)
Dept: CT IMAGING | Facility: HOSPITAL | Age: 79
Discharge: HOME | End: 2024-10-29
Admitting: NURSE PRACTITIONER
Payer: MEDICARE

## 2024-10-29 DIAGNOSIS — R39.11 URINARY HESITANCY: ICD-10-CM

## 2024-10-29 DIAGNOSIS — R31.0 GROSS HEMATURIA: ICD-10-CM

## 2024-10-29 PROCEDURE — 25510000001 IOPAMIDOL 61 % SOLUTION: Performed by: NURSE PRACTITIONER

## 2024-10-29 PROCEDURE — 74177 CT ABD & PELVIS W/CONTRAST: CPT

## 2024-10-29 RX ORDER — IOPAMIDOL 612 MG/ML
100 INJECTION, SOLUTION INTRAVASCULAR
Status: COMPLETED | OUTPATIENT
Start: 2024-10-29 | End: 2024-10-29

## 2024-10-29 RX ADMIN — IOPAMIDOL 100 ML: 612 INJECTION, SOLUTION INTRAVENOUS at 09:06

## 2024-10-30 ENCOUNTER — OFFICE VISIT (OUTPATIENT)
Dept: UROLOGY | Facility: CLINIC | Age: 79
End: 2024-10-30
Payer: MEDICARE

## 2024-10-30 VITALS — TEMPERATURE: 97.2 F | WEIGHT: 196 LBS | HEIGHT: 71 IN | BODY MASS INDEX: 27.44 KG/M2

## 2024-10-30 DIAGNOSIS — R68.89 ABNORMAL DIGITAL RECTAL EXAM: ICD-10-CM

## 2024-10-30 DIAGNOSIS — R31.0 GROSS HEMATURIA: Primary | ICD-10-CM

## 2024-10-30 DIAGNOSIS — R93.89 ABNORMAL CT SCAN: ICD-10-CM

## 2024-10-30 DIAGNOSIS — R97.20 ELEVATED PROSTATE SPECIFIC ANTIGEN (PSA): ICD-10-CM

## 2024-10-30 LAB
BILIRUB BLD-MCNC: NEGATIVE MG/DL
CLARITY, POC: CLEAR
COLOR UR: ABNORMAL
GLUCOSE UR STRIP-MCNC: NEGATIVE MG/DL
KETONES UR QL: NEGATIVE
LEUKOCYTE EST, POC: NEGATIVE
NITRITE UR-MCNC: NEGATIVE MG/ML
PH UR: 6 [PH] (ref 5–8)
PROT UR STRIP-MCNC: ABNORMAL MG/DL
RBC # UR STRIP: ABNORMAL /UL
SP GR UR: 1.03 (ref 1–1.03)
UROBILINOGEN UR QL: NORMAL

## 2024-10-30 PROCEDURE — 88112 CYTOPATH CELL ENHANCE TECH: CPT | Performed by: PHYSICIAN ASSISTANT

## 2024-10-30 RX ORDER — PANTOPRAZOLE SODIUM 40 MG/1
1 TABLET, DELAYED RELEASE ORAL DAILY
COMMUNITY

## 2024-10-30 RX ORDER — FLOMAX 0.4 MG/1
1 CAPSULE ORAL EVERY 24 HOURS
COMMUNITY
Start: 2024-10-15

## 2024-11-01 LAB
CYTO UR: NORMAL
LAB AP CASE REPORT: NORMAL
LAB AP CLINICAL INFORMATION: NORMAL
Lab: NORMAL
PATH REPORT.FINAL DX SPEC: NORMAL
PATH REPORT.GROSS SPEC: NORMAL

## 2024-11-05 ENCOUNTER — TELEPHONE (OUTPATIENT)
Dept: UROLOGY | Facility: CLINIC | Age: 79
End: 2024-11-05
Payer: MEDICARE

## 2024-11-05 NOTE — TELEPHONE ENCOUNTER
----- Message from Arjun Wilson sent at 11/1/2024  4:31 PM CDT -----  Regarding: cytology  Negative cytology  ----- Message -----  From: Bryce Simon MA  Sent: 10/30/2024   8:39 AM CDT  To: JULIA Horta

## 2024-11-06 ENCOUNTER — TELEPHONE (OUTPATIENT)
Dept: UROLOGY | Facility: CLINIC | Age: 79
End: 2024-11-06
Payer: MEDICARE

## 2024-11-08 ENCOUNTER — TELEPHONE (OUTPATIENT)
Dept: UROLOGY | Facility: CLINIC | Age: 79
End: 2024-11-08
Payer: MEDICARE

## 2024-11-08 NOTE — TELEPHONE ENCOUNTER
Called patient and got him scheduled for a follow-up with Arjun after his MRI has been completed on 12/6/24.  He has been scheduled to see Arjun on 12/11/24 at 8:40am.  Patient agreed to this time/date.

## 2024-11-11 ENCOUNTER — TELEPHONE (OUTPATIENT)
Dept: OTOLARYNGOLOGY | Facility: CLINIC | Age: 79
End: 2024-11-11
Payer: MEDICARE

## 2024-11-25 NOTE — PROGRESS NOTES
YOB: 1945  Location: [facility]  Location Address: [address] [place] [address], [facility], [address] [place] [address]  Location Phone: [phone]    Chief Complaint   Patient presents with    Exposed mandible plate       History of Present Illness  Francisco Sinclair is a 79 y.o. male.  Francisco Sinclair is here for evaluation of ENT complaints. The patient has had problems with previous right mandible fracture with plating approxiamtely 15 years ago   Patient states 2 months ago he began having pain with putting his dentures in and noticed an area of the plate exposed        Past Medical History:   Diagnosis Date    CAD (coronary artery disease)     Diverticulosis     GERD (gastroesophageal reflux disease)     History of adenomatous polyp of colon     History of colon polyps     Hyperlipidemia     Hypertension        Past Surgical History:   Procedure Laterality Date    CARDIAC CATHETERIZATION      COLONOSCOPY  2014    Diverticulosis; Questionable polyp on the ileocecal valve-path showed fragments of benign small intestinal mucosa with submucosa, no adenomatous changes identified; One 3mm adenomatous polyp in the ascending colon; One 6mm adenomatous polyp in the hepatic flexure; Repeat 5 years    COLONOSCOPY  2010    1cm hyperplastic polyp in the distal transverse colon; Diverticulosis; Repeat 4-5 years    COLONOSCOPY  2007    Diverticulosis; Internal hemorrhoids; 7mm hyperplastic rectal polyp; Repeat 3 years    COLONOSCOPY N/A 2019    Two 4mm tubular adenomatous polyps in the transverse colon; Diverticulosis in the left colon; Repeat 5 years    COLONOSCOPY N/A 2024    Procedure: COLONOSCOPY WITH ANESTHESIA;  Surgeon: Ana Oliva MD;  Location: [facility] ENDOSCOPY;  Service: Gastroenterology;  Laterality: N/A;  preop; hx of polyps  postop diverticulosis; polyps   PCP Kurt Pichardo    CORONARY ARTERY BYPASS GRAFT      [place], [place]    ENDOSCOPY N/A 2024    Procedure:  ESOPHAGOGASTRODUODENOSCOPY WITH ANESTHESIA;  Surgeon: Ana Oliva MD;  Location: [facility] ENDOSCOPY;  Service: Gastroenterology;  Laterality: N/A;  preop; black stools;   postop  PCP Kurt Pichardo    FOOT SURGERY         Outpatient Medications Marked as Taking for the 24 encounter (Office Visit) with Gutierrez Miles MD   Medication Sig Dispense Refill    amLODIPine (NORVASC) 5 MG tablet Take 1 tablet by mouth Daily.      Aspirin 81 MG capsule Take 81 mg by mouth Daily.      atorvastatin (LIPITOR) 80 MG tablet Take 0.5 tablets by mouth Every Night.      busPIRone (BUSPAR) 10 MG tablet Take 1 tablet by mouth Every Night.      carvedilol (COREG) 12.5 MG tablet Take 0.5 tablets by mouth Daily.      Coenzyme Q10 (Co Q 10) 100 MG capsule Take 200 mg by mouth Daily.      dipyridamole (PERSANTINE) 75 MG tablet Take 1 tablet by mouth 3 (Three) Times a Day.      Flomax 0.4 MG capsule 24 hr capsule 1 capsule Daily.      lisinopril (PRINIVIL,ZESTRIL) 10 MG tablet Take 1 tablet by mouth Daily.      multivitamin with minerals (Multivitamin Adults) tablet tablet Take 1 tablet by mouth Daily.      Omega-3 Fatty Acids (FISH OIL) 1000 MG capsule capsule Take 1 capsule by mouth Daily With Breakfast.      pantoprazole (PROTONIX) 40 MG EC tablet Take 1 tablet by mouth Daily.      ranolazine (RANEXA) 500 MG 12 hr tablet Take 1 tablet by mouth 2 (Two) Times a Day.         Rosuvastatin    Family History   Problem Relation Age of Onset    Colon cancer Neg Hx     Colon polyps Neg Hx     Esophageal cancer Neg Hx     Liver cancer Neg Hx     Liver disease Neg Hx     Rectal cancer Neg Hx     Stomach cancer Neg Hx        Social History     Socioeconomic History    Marital status:    Tobacco Use    Smoking status: Former     Current packs/day: 0.00     Types: Cigarettes     Quit date: 2003     Years since quittin.4    Smokeless tobacco: Never   Vaping Use    Vaping status: Never Used   Substance and Sexual Activity     Alcohol use: No    Drug use: Defer    Sexual activity: Defer       Review of Systems   Constitutional: Negative.    HENT:  Positive for mouth sores.        Vitals:    11/26/24 1557   BP: 152/77   Pulse: 81   Temp: 97.7 °F (36.5 °C)       Body mass index is 26.5 kg/m².    Objective     Physical Exam  Vitals reviewed.   Constitutional:       Appearance: He is normal weight.   HENT:      Head: Normocephalic.      Right Ear: Tympanic membrane, ear canal and external ear normal.      Left Ear: Tympanic membrane, ear canal and external ear normal.      Nose: Nose normal.      Mouth/Throat:      Lips: Pink.      Mouth: Mucous membranes are moist.      Comments: Edentulous  Lower right with area of exposed plate no surround erythema or edema     Neurological:      Mental Status: He is alert.           Dr. Miles has examined and assessed the patient and agrees with current treatment plan      Assessment & Plan   Diagnoses and all orders for this visit:    1. Closed fracture of right side of mandible, unspecified mandibular site, sequela (Primary)  -     CT Maxillofacial Without Contrast; Future  -     XR Mandible 4+ View; Future    2. Open wound of mouth, subsequent encounter      * Surgery not found *  Orders Placed This Encounter   Procedures    CT Maxillofacial Without Contrast     Standing Status:   Future     Standing Expiration Date:   11/26/2025     Order Specific Question:   Release to patient     Answer:   Routine Release [[id number]]     Order Specific Question:   Reason for Exam:     Answer:   previous mandible fracture    XR Mandible 4+ View     Standing Status:   Future     Standing Expiration Date:   11/26/2025     Order Specific Question:   Reason for Exam:     Answer:   previous mandible fracture     Order Specific Question:   Release to patient     Answer:   Routine Release [[id number]]     No follow-ups on file.     Ct scan and x ray prior to f/u     There are no Patient Instructions on file for this  visit.

## 2024-11-26 ENCOUNTER — OFFICE VISIT (OUTPATIENT)
Dept: OTOLARYNGOLOGY | Facility: CLINIC | Age: 79
End: 2024-11-26
Payer: MEDICARE

## 2024-11-26 VITALS
HEART RATE: 81 BPM | DIASTOLIC BLOOD PRESSURE: 77 MMHG | BODY MASS INDEX: 26.6 KG/M2 | SYSTOLIC BLOOD PRESSURE: 152 MMHG | TEMPERATURE: 97.7 F | WEIGHT: 190 LBS | HEIGHT: 71 IN

## 2024-11-26 DIAGNOSIS — S01.502D: ICD-10-CM

## 2024-11-26 DIAGNOSIS — S02.609S: Primary | ICD-10-CM

## 2024-11-27 ENCOUNTER — TELEPHONE (OUTPATIENT)
Dept: OTOLARYNGOLOGY | Facility: CLINIC | Age: 79
End: 2024-11-27
Payer: MEDICARE

## 2024-11-27 NOTE — TELEPHONE ENCOUNTER
----- Message from Cherelle GONZALES sent at 11/27/2024  8:07 AM CST -----  [facility]. He had stopped and talked to me about it yesterday, and I let him know that he may need to reach out to that [facility] PCP to get a new authorization.  ----- Message -----  From: Niya Patton RN  Sent: 11/26/2024   5:07 PM CST  To: SUKHWINDER Yun    Can we call pcp and see if they will put referral in?  ----- Message -----  From: Cherelle Donahue PCT  Sent: 11/26/2024   4:57 PM CST  To: Niya Patton RN    Since we never saw him from that original authorization, he has to reach out to his [facility] PCP to get a new authorization. If we had seen him even once with the first one, I could have gotten a new one. That's what I've been told in the past at least.  ----- Message -----  From: Niya Patton RN  Sent: 11/26/2024   4:34 PM CST  To: SUKHWINDER Yun    Can we make sure that the [facility] auth is still good for his ears. He had appt in feb and April but had to cancel due to wife being sick.

## 2024-12-04 NOTE — H&P (VIEW-ONLY)
Subjective    Mr. Sinclair is 79 y.o. male    Chief Complaint: Discuss MRI result    History of Present Illness  Patient presents to discuss MRI result patient had elevated PSA he then underwent CT scan for gross hematuria abnormal findings involving the prostate.  Also his digital rectal exam was grossly abnormal when I saw him last.  Symptom wise patient is states he is had no change or worsening symptoms he is not seen any further blood in the urine.  MRI did show large PI-RADS 5 lesion involving the posterior prostate peripheral zone extending from base to apex.  Peers to be extra prostatic extension of the right show prostate static fat as well as invasion of the seminal vesicles there is no obvious pelvic lymphadenopathy or suspicious pelvic bone lesions.    The following portions of the patient's history were reviewed and updated as appropriate: allergies, current medications, past family history, past medical history, past social history, past surgical history and problem list.    Review of Systems   Constitutional: Negative.    Genitourinary:  Positive for hematuria. Negative for difficulty urinating, dysuria and flank pain.         Current Outpatient Medications:     amLODIPine (NORVASC) 5 MG tablet, Take 1 tablet by mouth Daily., Disp: , Rfl:     Aspirin 81 MG capsule, Take 81 mg by mouth Daily., Disp: , Rfl:     atorvastatin (LIPITOR) 80 MG tablet, Take 0.5 tablets by mouth Every Night., Disp: , Rfl:     busPIRone (BUSPAR) 10 MG tablet, Take 1 tablet by mouth Every Night., Disp: , Rfl:     carvedilol (COREG) 12.5 MG tablet, Take 0.5 tablets by mouth Daily., Disp: , Rfl:     Coenzyme Q10 (Co Q 10) 100 MG capsule, Take 200 mg by mouth Daily., Disp: , Rfl:     dipyridamole (PERSANTINE) 75 MG tablet, Take 1 tablet by mouth 3 (Three) Times a Day., Disp: , Rfl:     Flomax 0.4 MG capsule 24 hr capsule, 1 capsule Daily., Disp: , Rfl:     lisinopril (PRINIVIL,ZESTRIL) 10 MG tablet, Take 1 tablet by mouth Daily.,  Disp: , Rfl:     multivitamin with minerals (Multivitamin Adults) tablet tablet, Take 1 tablet by mouth Daily., Disp: , Rfl:     Omega-3 Fatty Acids (FISH OIL) 1000 MG capsule capsule, Take 1 capsule by mouth Daily With Breakfast., Disp: , Rfl:     pantoprazole (PROTONIX) 40 MG EC tablet, Take 1 tablet by mouth Daily., Disp: , Rfl:     ranolazine (RANEXA) 500 MG 12 hr tablet, Take 1 tablet by mouth 2 (Two) Times a Day., Disp: , Rfl:     cephalexin (Keflex) 500 MG capsule, Take 1 capsule by mouth 3 (Three) Times a Day for 3 doses. Take 1 tab day before procedure, take 1 tab day of procedure, and take 1 tab day after procedure.  Indications: Preventative Medication Therapy Used Around Surgery, Disp: 3 capsule, Rfl: 0    Past Medical History:   Diagnosis Date    CAD (coronary artery disease)     Diverticulosis     GERD (gastroesophageal reflux disease)     History of adenomatous polyp of colon     History of colon polyps     Hyperlipidemia     Hypertension        Past Surgical History:   Procedure Laterality Date    CARDIAC CATHETERIZATION      COLONOSCOPY  08/04/2014    Diverticulosis; Questionable polyp on the ileocecal valve-path showed fragments of benign small intestinal mucosa with submucosa, no adenomatous changes identified; One 3mm adenomatous polyp in the ascending colon; One 6mm adenomatous polyp in the hepatic flexure; Repeat 5 years    COLONOSCOPY  07/14/2010    1cm hyperplastic polyp in the distal transverse colon; Diverticulosis; Repeat 4-5 years    COLONOSCOPY  06/13/2007    Diverticulosis; Internal hemorrhoids; 7mm hyperplastic rectal polyp; Repeat 3 years    COLONOSCOPY N/A 07/31/2019    Two 4mm tubular adenomatous polyps in the transverse colon; Diverticulosis in the left colon; Repeat 5 years    COLONOSCOPY N/A 6/7/2024    Procedure: COLONOSCOPY WITH ANESTHESIA;  Surgeon: Ana Oliva MD;  Location: [facility] ENDOSCOPY;  Service: Gastroenterology;  Laterality: N/A;  preop; hx of polyps  postop  "diverticulosis; polyps   PCP Kurt Pichardo    CORONARY ARTERY BYPASS GRAFT      [place], [place]    ENDOSCOPY N/A 2024    Procedure: ESOPHAGOGASTRODUODENOSCOPY WITH ANESTHESIA;  Surgeon: Ana Oliva MD;  Location: [facility] ENDOSCOPY;  Service: Gastroenterology;  Laterality: N/A;  preop; black stools;   postop  PCP Kurt Pichardo    FOOT SURGERY         Social History     Socioeconomic History    Marital status:    Tobacco Use    Smoking status: Former     Current packs/day: 0.00     Types: Cigarettes     Quit date: 2003     Years since quittin.4    Smokeless tobacco: Never   Vaping Use    Vaping status: Never Used   Substance and Sexual Activity    Alcohol use: No    Drug use: Defer    Sexual activity: Defer       Family History   Problem Relation Age of Onset    Colon cancer Neg Hx     Colon polyps Neg Hx     Esophageal cancer Neg Hx     Liver cancer Neg Hx     Liver disease Neg Hx     Rectal cancer Neg Hx     Stomach cancer Neg Hx        Objective    Temp 97.8 °F (36.6 °C)   Ht 180.3 cm (71\")   Wt 91.6 kg (202 lb)   BMI 28.17 kg/m²     Physical Exam  Constitutional:       Appearance: Normal appearance.   HENT:      Head: Normocephalic.   Pulmonary:      Effort: Pulmonary effort is normal.   Skin:     Coloration: Skin is not pale.   Neurological:      Mental Status: He is alert.   Psychiatric:         Mood and Affect: Mood normal.             Results for orders placed or performed during the hospital encounter of 24   POC Creatinine    Collection Time: 24  7:51 AM    Specimen: Blood   Result Value Ref Range    Creatinine 1.30 0.60 - 1.30 mg/dL     MRI pelvis:  Examined images of the prostate on MRI.  There is a PI-RADS 5 lesion seen in the prostate also some extraprostatic extension into the fatty tissue and seminal vesicles no obvious lymphadenopathy or bone lesions.  Assessment and Plan    Diagnoses and all orders for this visit:    1. Abnormal MRI, pelvis (Primary)  -     POC " Urinalysis Dipstick, Multipro  -     Case Request; Standing  -     ceFAZolin (ANCEF) 2,000 mg in sodium chloride 0.9 % 100 mL IVPB  -     Case Request  -     cephalexin (Keflex) 500 MG capsule; Take 1 capsule by mouth 3 (Three) Times a Day for 3 doses. Take 1 tab day before procedure, take 1 tab day of procedure, and take 1 tab day after procedure.  Indications: Preventative Medication Therapy Used Around Surgery  Dispense: 3 capsule; Refill: 0    2. Elevated prostate specific antigen (PSA)  -     Case Request; Standing  -     ceFAZolin (ANCEF) 2,000 mg in sodium chloride 0.9 % 100 mL IVPB  -     Case Request  -     cephalexin (Keflex) 500 MG capsule; Take 1 capsule by mouth 3 (Three) Times a Day for 3 doses. Take 1 tab day before procedure, take 1 tab day of procedure, and take 1 tab day after procedure.  Indications: Preventative Medication Therapy Used Around Surgery  Dispense: 3 capsule; Refill: 0    Other orders  -     Follow Anesthesia Guidelines / Protocol; Future  -     Follow Anesthesia Guidelines / Protocol; Standing  -     Verify / Perform Chlorhexidine Skin Prep; Standing  -     Provide NPO Instructions to Patient; Future  -     Chlorhexidine Skin Prep; Future  -     Place Sequential Compression Device; Standing  -     Maintain Sequential Compression Device; Standing      Normal MRI of the prostate suspicious for prostate cancer.  PI-RADS 5 lesion.  I discussed this with the patient and he would like to be further evaluated with MRI fusion biopsy to be evaluated for prostate cancer.  Did discuss the risks of MRI of the prostate and biopsy which include UTI prostatitis and urosepsis.  Also hematuria is a possible adverse event.    Told him to stop his aspirin approximately 5 days prior and he will take a preoperative antibiotic day before day of and day after procedure.

## 2024-12-04 NOTE — PROGRESS NOTES
Subjective    Mr. Sinclair is 79 y.o. male    Chief Complaint: Discuss MRI result    History of Present Illness  Patient presents to discuss MRI result patient had elevated PSA he then underwent CT scan for gross hematuria abnormal findings involving the prostate.  Also his digital rectal exam was grossly abnormal when I saw him last.  Symptom wise patient is states he is had no change or worsening symptoms he is not seen any further blood in the urine.  MRI did show large PI-RADS 5 lesion involving the posterior prostate peripheral zone extending from base to apex.  Peers to be extra prostatic extension of the right show prostate static fat as well as invasion of the seminal vesicles there is no obvious pelvic lymphadenopathy or suspicious pelvic bone lesions.    The following portions of the patient's history were reviewed and updated as appropriate: allergies, current medications, past family history, past medical history, past social history, past surgical history and problem list.    Review of Systems   Constitutional: Negative.    Genitourinary:  Positive for hematuria. Negative for difficulty urinating, dysuria and flank pain.         Current Outpatient Medications:     amLODIPine (NORVASC) 5 MG tablet, Take 1 tablet by mouth Daily., Disp: , Rfl:     Aspirin 81 MG capsule, Take 81 mg by mouth Daily., Disp: , Rfl:     atorvastatin (LIPITOR) 80 MG tablet, Take 0.5 tablets by mouth Every Night., Disp: , Rfl:     busPIRone (BUSPAR) 10 MG tablet, Take 1 tablet by mouth Every Night., Disp: , Rfl:     carvedilol (COREG) 12.5 MG tablet, Take 0.5 tablets by mouth Daily., Disp: , Rfl:     Coenzyme Q10 (Co Q 10) 100 MG capsule, Take 200 mg by mouth Daily., Disp: , Rfl:     dipyridamole (PERSANTINE) 75 MG tablet, Take 1 tablet by mouth 3 (Three) Times a Day., Disp: , Rfl:     Flomax 0.4 MG capsule 24 hr capsule, 1 capsule Daily., Disp: , Rfl:     lisinopril (PRINIVIL,ZESTRIL) 10 MG tablet, Take 1 tablet by mouth Daily.,  Disp: , Rfl:     multivitamin with minerals (Multivitamin Adults) tablet tablet, Take 1 tablet by mouth Daily., Disp: , Rfl:     Omega-3 Fatty Acids (FISH OIL) 1000 MG capsule capsule, Take 1 capsule by mouth Daily With Breakfast., Disp: , Rfl:     pantoprazole (PROTONIX) 40 MG EC tablet, Take 1 tablet by mouth Daily., Disp: , Rfl:     ranolazine (RANEXA) 500 MG 12 hr tablet, Take 1 tablet by mouth 2 (Two) Times a Day., Disp: , Rfl:     cephalexin (Keflex) 500 MG capsule, Take 1 capsule by mouth 3 (Three) Times a Day for 3 doses. Take 1 tab day before procedure, take 1 tab day of procedure, and take 1 tab day after procedure.  Indications: Preventative Medication Therapy Used Around Surgery, Disp: 3 capsule, Rfl: 0    Past Medical History:   Diagnosis Date    CAD (coronary artery disease)     Diverticulosis     GERD (gastroesophageal reflux disease)     History of adenomatous polyp of colon     History of colon polyps     Hyperlipidemia     Hypertension        Past Surgical History:   Procedure Laterality Date    CARDIAC CATHETERIZATION      COLONOSCOPY  08/04/2014    Diverticulosis; Questionable polyp on the ileocecal valve-path showed fragments of benign small intestinal mucosa with submucosa, no adenomatous changes identified; One 3mm adenomatous polyp in the ascending colon; One 6mm adenomatous polyp in the hepatic flexure; Repeat 5 years    COLONOSCOPY  07/14/2010    1cm hyperplastic polyp in the distal transverse colon; Diverticulosis; Repeat 4-5 years    COLONOSCOPY  06/13/2007    Diverticulosis; Internal hemorrhoids; 7mm hyperplastic rectal polyp; Repeat 3 years    COLONOSCOPY N/A 07/31/2019    Two 4mm tubular adenomatous polyps in the transverse colon; Diverticulosis in the left colon; Repeat 5 years    COLONOSCOPY N/A 6/7/2024    Procedure: COLONOSCOPY WITH ANESTHESIA;  Surgeon: Ana Oliva MD;  Location: [facility] ENDOSCOPY;  Service: Gastroenterology;  Laterality: N/A;  preop; hx of polyps  postop  "diverticulosis; polyps   PCP Kurt Pichardo    CORONARY ARTERY BYPASS GRAFT      [place], [place]    ENDOSCOPY N/A 2024    Procedure: ESOPHAGOGASTRODUODENOSCOPY WITH ANESTHESIA;  Surgeon: Ana Oliva MD;  Location: [facility] ENDOSCOPY;  Service: Gastroenterology;  Laterality: N/A;  preop; black stools;   postop  PCP Kurt Pichardo    FOOT SURGERY         Social History     Socioeconomic History    Marital status:    Tobacco Use    Smoking status: Former     Current packs/day: 0.00     Types: Cigarettes     Quit date: 2003     Years since quittin.4    Smokeless tobacco: Never   Vaping Use    Vaping status: Never Used   Substance and Sexual Activity    Alcohol use: No    Drug use: Defer    Sexual activity: Defer       Family History   Problem Relation Age of Onset    Colon cancer Neg Hx     Colon polyps Neg Hx     Esophageal cancer Neg Hx     Liver cancer Neg Hx     Liver disease Neg Hx     Rectal cancer Neg Hx     Stomach cancer Neg Hx        Objective    Temp 97.8 °F (36.6 °C)   Ht 180.3 cm (71\")   Wt 91.6 kg (202 lb)   BMI 28.17 kg/m²     Physical Exam  Constitutional:       Appearance: Normal appearance.   HENT:      Head: Normocephalic.   Pulmonary:      Effort: Pulmonary effort is normal.   Skin:     Coloration: Skin is not pale.   Neurological:      Mental Status: He is alert.   Psychiatric:         Mood and Affect: Mood normal.             Results for orders placed or performed during the hospital encounter of 24   POC Creatinine    Collection Time: 24  7:51 AM    Specimen: Blood   Result Value Ref Range    Creatinine 1.30 0.60 - 1.30 mg/dL     MRI pelvis:  Examined images of the prostate on MRI.  There is a PI-RADS 5 lesion seen in the prostate also some extraprostatic extension into the fatty tissue and seminal vesicles no obvious lymphadenopathy or bone lesions.  Assessment and Plan    Diagnoses and all orders for this visit:    1. Abnormal MRI, pelvis (Primary)  -     POC " Urinalysis Dipstick, Multipro  -     Case Request; Standing  -     ceFAZolin (ANCEF) 2,000 mg in sodium chloride 0.9 % 100 mL IVPB  -     Case Request  -     cephalexin (Keflex) 500 MG capsule; Take 1 capsule by mouth 3 (Three) Times a Day for 3 doses. Take 1 tab day before procedure, take 1 tab day of procedure, and take 1 tab day after procedure.  Indications: Preventative Medication Therapy Used Around Surgery  Dispense: 3 capsule; Refill: 0    2. Elevated prostate specific antigen (PSA)  -     Case Request; Standing  -     ceFAZolin (ANCEF) 2,000 mg in sodium chloride 0.9 % 100 mL IVPB  -     Case Request  -     cephalexin (Keflex) 500 MG capsule; Take 1 capsule by mouth 3 (Three) Times a Day for 3 doses. Take 1 tab day before procedure, take 1 tab day of procedure, and take 1 tab day after procedure.  Indications: Preventative Medication Therapy Used Around Surgery  Dispense: 3 capsule; Refill: 0    Other orders  -     Follow Anesthesia Guidelines / Protocol; Future  -     Follow Anesthesia Guidelines / Protocol; Standing  -     Verify / Perform Chlorhexidine Skin Prep; Standing  -     Provide NPO Instructions to Patient; Future  -     Chlorhexidine Skin Prep; Future  -     Place Sequential Compression Device; Standing  -     Maintain Sequential Compression Device; Standing      Normal MRI of the prostate suspicious for prostate cancer.  PI-RADS 5 lesion.  I discussed this with the patient and he would like to be further evaluated with MRI fusion biopsy to be evaluated for prostate cancer.  Did discuss the risks of MRI of the prostate and biopsy which include UTI prostatitis and urosepsis.  Also hematuria is a possible adverse event.    Told him to stop his aspirin approximately 5 days prior and he will take a preoperative antibiotic day before day of and day after procedure.

## 2024-12-06 ENCOUNTER — HOSPITAL ENCOUNTER (OUTPATIENT)
Dept: MRI IMAGING | Facility: HOSPITAL | Age: 79
Discharge: HOME | End: 2024-12-06
Payer: MEDICARE

## 2024-12-06 DIAGNOSIS — R97.20 ELEVATED PROSTATE SPECIFIC ANTIGEN (PSA): ICD-10-CM

## 2024-12-06 DIAGNOSIS — R68.89 ABNORMAL DIGITAL RECTAL EXAM: ICD-10-CM

## 2024-12-06 DIAGNOSIS — R93.89 ABNORMAL CT SCAN: ICD-10-CM

## 2024-12-06 PROCEDURE — 72197 MRI PELVIS W/O & W/DYE: CPT

## 2024-12-06 PROCEDURE — A9579 GAD-BASE MR CONTRAST NOS,1ML: HCPCS | Performed by: PHYSICIAN ASSISTANT

## 2024-12-06 PROCEDURE — 25510000001 GADOPICLENOL 0.5 MMOL/ML SOLUTION: Performed by: PHYSICIAN ASSISTANT

## 2024-12-06 RX ADMIN — GADOPICLENOL 8.5 ML: 485.1 INJECTION INTRAVENOUS at 08:41

## 2024-12-09 LAB — CREAT BLDA-MCNC: 1.3 MG/DL (ref 0.6–1.3)

## 2024-12-11 ENCOUNTER — OFFICE VISIT (OUTPATIENT)
Dept: UROLOGY | Facility: CLINIC | Age: 79
End: 2024-12-11
Payer: MEDICARE

## 2024-12-11 ENCOUNTER — HOSPITAL ENCOUNTER (OUTPATIENT)
Dept: CT IMAGING | Facility: HOSPITAL | Age: 79
Discharge: HOME | End: 2024-12-11
Admitting: NURSE PRACTITIONER
Payer: MEDICARE

## 2024-12-11 VITALS — WEIGHT: 202 LBS | BODY MASS INDEX: 28.28 KG/M2 | HEIGHT: 71 IN | TEMPERATURE: 97.8 F

## 2024-12-11 DIAGNOSIS — S02.609S: ICD-10-CM

## 2024-12-11 DIAGNOSIS — R93.5 ABNORMAL MRI, PELVIS: Primary | ICD-10-CM

## 2024-12-11 DIAGNOSIS — R97.20 ELEVATED PROSTATE SPECIFIC ANTIGEN (PSA): ICD-10-CM

## 2024-12-11 PROCEDURE — 70486 CT MAXILLOFACIAL W/O DYE: CPT

## 2024-12-11 RX ORDER — CEPHALEXIN 500 MG/1
500 CAPSULE ORAL 3 TIMES DAILY
Qty: 3 CAPSULE | Refills: 0 | Status: SHIPPED | OUTPATIENT
Start: 2024-12-11 | End: 2024-12-12

## 2024-12-12 ENCOUNTER — TELEPHONE (OUTPATIENT)
Dept: OTOLARYNGOLOGY | Facility: CLINIC | Age: 79
End: 2024-12-12
Payer: MEDICARE

## 2024-12-12 NOTE — TELEPHONE ENCOUNTER
----- Message from Heaven Salazar sent at 12/12/2024  5:16 PM CST -----  Call patient with results

## 2024-12-20 ENCOUNTER — PRE-ADMISSION TESTING (OUTPATIENT)
Dept: PREADMISSION TESTING | Facility: HOSPITAL | Age: 79
End: 2024-12-20
Payer: MEDICARE

## 2024-12-20 VITALS
OXYGEN SATURATION: 100 % | SYSTOLIC BLOOD PRESSURE: 153 MMHG | HEART RATE: 77 BPM | RESPIRATION RATE: 18 BRPM | DIASTOLIC BLOOD PRESSURE: 87 MMHG | BODY MASS INDEX: 28.93 KG/M2 | HEIGHT: 69 IN | WEIGHT: 195.33 LBS

## 2024-12-20 LAB
ANION GAP SERPL CALCULATED.3IONS-SCNC: 10 MMOL/L (ref 5–15)
BUN SERPL-MCNC: 16 MG/DL (ref 8–23)
BUN/CREAT SERPL: 14 (ref 7–25)
CALCIUM SPEC-SCNC: 8.9 MG/DL (ref 8.6–10.5)
CHLORIDE SERPL-SCNC: 106 MMOL/L (ref 98–107)
CO2 SERPL-SCNC: 27 MMOL/L (ref 22–29)
CREAT SERPL-MCNC: 1.14 MG/DL (ref 0.76–1.27)
DEPRECATED RDW RBC AUTO: 46.4 FL (ref 37–54)
EGFRCR SERPLBLD CKD-EPI 2021: 64.6 ML/MIN/1.73
ERYTHROCYTE [DISTWIDTH] IN BLOOD BY AUTOMATED COUNT: 13.3 % (ref 12.3–15.4)
GLUCOSE SERPL-MCNC: 97 MG/DL (ref 65–99)
HCT VFR BLD AUTO: 41.7 % (ref 37.5–51)
HGB BLD-MCNC: 14.1 G/DL (ref 13–17.7)
MCH RBC QN AUTO: 31.8 PG (ref 26.6–33)
MCHC RBC AUTO-ENTMCNC: 33.8 G/DL (ref 31.5–35.7)
MCV RBC AUTO: 94.1 FL (ref 79–97)
PLATELET # BLD AUTO: 131 10*3/MM3 (ref 140–450)
PMV BLD AUTO: 10.4 FL (ref 6–12)
POTASSIUM SERPL-SCNC: 4.3 MMOL/L (ref 3.5–5.2)
RBC # BLD AUTO: 4.43 10*6/MM3 (ref 4.14–5.8)
SODIUM SERPL-SCNC: 143 MMOL/L (ref 136–145)
WBC NRBC COR # BLD AUTO: 4.16 10*3/MM3 (ref 3.4–10.8)

## 2024-12-20 PROCEDURE — 36415 COLL VENOUS BLD VENIPUNCTURE: CPT

## 2024-12-20 PROCEDURE — 93005 ELECTROCARDIOGRAM TRACING: CPT

## 2024-12-20 PROCEDURE — 80048 BASIC METABOLIC PNL TOTAL CA: CPT

## 2024-12-20 PROCEDURE — 85027 COMPLETE CBC AUTOMATED: CPT

## 2024-12-20 NOTE — DISCHARGE INSTRUCTIONS

## 2024-12-21 LAB
QT INTERVAL: 372 MS
QTC INTERVAL: 407 MS

## 2024-12-31 ENCOUNTER — TELEPHONE (OUTPATIENT)
Dept: UROLOGY | Facility: CLINIC | Age: 79
End: 2024-12-31
Payer: MEDICARE

## 2024-12-31 NOTE — TELEPHONE ENCOUNTER
Called patient to remind them to arrive at patient registration on 1/2/25 at 0500 for the procedure with Dr. Pat. Spoke with patient. Told patient if they had any questions to please contact our office at [phone].

## 2025-01-02 ENCOUNTER — ANESTHESIA (OUTPATIENT)
Dept: PERIOP | Facility: HOSPITAL | Age: 80
End: 2025-01-02
Payer: MEDICARE

## 2025-01-02 ENCOUNTER — ANESTHESIA EVENT (OUTPATIENT)
Dept: PERIOP | Facility: HOSPITAL | Age: 80
End: 2025-01-02
Payer: MEDICARE

## 2025-01-02 ENCOUNTER — HOSPITAL ENCOUNTER (OUTPATIENT)
Facility: HOSPITAL | Age: 80
Setting detail: HOSPITAL OUTPATIENT SURGERY
Discharge: HOME | End: 2025-01-02
Attending: UROLOGY | Admitting: UROLOGY
Payer: MEDICARE

## 2025-01-02 VITALS
TEMPERATURE: 97.8 F | OXYGEN SATURATION: 95 % | SYSTOLIC BLOOD PRESSURE: 168 MMHG | HEART RATE: 88 BPM | RESPIRATION RATE: 16 BRPM | DIASTOLIC BLOOD PRESSURE: 81 MMHG

## 2025-01-02 DIAGNOSIS — R97.20 ELEVATED PROSTATE SPECIFIC ANTIGEN (PSA): ICD-10-CM

## 2025-01-02 DIAGNOSIS — R93.5 ABNORMAL MRI, PELVIS: ICD-10-CM

## 2025-01-02 PROCEDURE — 25010000002 GENTAMICIN PER 80 MG: Performed by: UROLOGY

## 2025-01-02 PROCEDURE — 25810000003 LACTATED RINGERS PER 1000 ML: Performed by: UROLOGY

## 2025-01-02 PROCEDURE — 25010000002 FENTANYL CITRATE (PF) 100 MCG/2ML SOLUTION: Performed by: NURSE ANESTHETIST, CERTIFIED REGISTERED

## 2025-01-02 PROCEDURE — 55700 PR PROSTATE NEEDLE BIOPSY ANY APPROACH: CPT | Performed by: UROLOGY

## 2025-01-02 PROCEDURE — 25010000002 ONDANSETRON PER 1 MG: Performed by: NURSE ANESTHETIST, CERTIFIED REGISTERED

## 2025-01-02 PROCEDURE — G0416 PROSTATE BIOPSY, ANY MTHD: HCPCS | Performed by: UROLOGY

## 2025-01-02 PROCEDURE — 25010000002 CEFAZOLIN PER 500 MG: Performed by: PHYSICIAN ASSISTANT

## 2025-01-02 PROCEDURE — 88342 IMHCHEM/IMCYTCHM 1ST ANTB: CPT | Performed by: UROLOGY

## 2025-01-02 PROCEDURE — 76942 ECHO GUIDE FOR BIOPSY: CPT | Performed by: UROLOGY

## 2025-01-02 PROCEDURE — 25010000002 DEXAMETHASONE PER 1 MG: Performed by: NURSE ANESTHETIST, CERTIFIED REGISTERED

## 2025-01-02 PROCEDURE — 25010000002 LIDOCAINE PF 2% 2 % SOLUTION: Performed by: NURSE ANESTHETIST, CERTIFIED REGISTERED

## 2025-01-02 PROCEDURE — 25010000002 PROPOFOL 10 MG/ML EMULSION: Performed by: NURSE ANESTHETIST, CERTIFIED REGISTERED

## 2025-01-02 RX ORDER — SODIUM CHLORIDE 9 MG/ML
40 INJECTION, SOLUTION INTRAVENOUS AS NEEDED
Status: DISCONTINUED | OUTPATIENT
Start: 2025-01-02 | End: 2025-01-02 | Stop reason: HOSPADM

## 2025-01-02 RX ORDER — LIDOCAINE HYDROCHLORIDE 10 MG/ML
0.5 INJECTION, SOLUTION EPIDURAL; INFILTRATION; INTRACAUDAL; PERINEURAL ONCE AS NEEDED
Status: DISCONTINUED | OUTPATIENT
Start: 2025-01-02 | End: 2025-01-02 | Stop reason: HOSPADM

## 2025-01-02 RX ORDER — IBUPROFEN 600 MG/1
600 TABLET, FILM COATED ORAL EVERY 6 HOURS PRN
Status: DISCONTINUED | OUTPATIENT
Start: 2025-01-02 | End: 2025-01-02 | Stop reason: HOSPADM

## 2025-01-02 RX ORDER — FLUMAZENIL 0.1 MG/ML
0.2 INJECTION INTRAVENOUS AS NEEDED
Status: DISCONTINUED | OUTPATIENT
Start: 2025-01-02 | End: 2025-01-02 | Stop reason: HOSPADM

## 2025-01-02 RX ORDER — HYDROCODONE BITARTRATE AND ACETAMINOPHEN 5; 325 MG/1; MG/1
1 TABLET ORAL ONCE AS NEEDED
Status: DISCONTINUED | OUTPATIENT
Start: 2025-01-02 | End: 2025-01-02 | Stop reason: HOSPADM

## 2025-01-02 RX ORDER — SODIUM CHLORIDE 0.9 % (FLUSH) 0.9 %
3 SYRINGE (ML) INJECTION EVERY 12 HOURS SCHEDULED
Status: DISCONTINUED | OUTPATIENT
Start: 2025-01-02 | End: 2025-01-02 | Stop reason: HOSPADM

## 2025-01-02 RX ORDER — SODIUM CHLORIDE 0.9 % (FLUSH) 0.9 %
3 SYRINGE (ML) INJECTION AS NEEDED
Status: DISCONTINUED | OUTPATIENT
Start: 2025-01-02 | End: 2025-01-02 | Stop reason: HOSPADM

## 2025-01-02 RX ORDER — FENTANYL CITRATE 50 UG/ML
INJECTION, SOLUTION INTRAMUSCULAR; INTRAVENOUS AS NEEDED
Status: DISCONTINUED | OUTPATIENT
Start: 2025-01-02 | End: 2025-01-02 | Stop reason: SURG

## 2025-01-02 RX ORDER — ASPIRIN 81 MG/1
81 TABLET, CHEWABLE ORAL ONCE
Status: COMPLETED | OUTPATIENT
Start: 2025-01-02 | End: 2025-01-02

## 2025-01-02 RX ORDER — OXYCODONE AND ACETAMINOPHEN 10; 325 MG/1; MG/1
1 TABLET ORAL EVERY 4 HOURS PRN
Status: DISCONTINUED | OUTPATIENT
Start: 2025-01-02 | End: 2025-01-02 | Stop reason: HOSPADM

## 2025-01-02 RX ORDER — ONDANSETRON 2 MG/ML
4 INJECTION INTRAMUSCULAR; INTRAVENOUS
Status: DISCONTINUED | OUTPATIENT
Start: 2025-01-02 | End: 2025-01-02 | Stop reason: HOSPADM

## 2025-01-02 RX ORDER — GENTAMICIN SULFATE 80 MG/100ML
80 INJECTION, SOLUTION INTRAVENOUS ONCE
Status: COMPLETED | OUTPATIENT
Start: 2025-01-02 | End: 2025-01-02

## 2025-01-02 RX ORDER — PROPOFOL 10 MG/ML
VIAL (ML) INTRAVENOUS AS NEEDED
Status: DISCONTINUED | OUTPATIENT
Start: 2025-01-02 | End: 2025-01-02 | Stop reason: SURG

## 2025-01-02 RX ORDER — ONDANSETRON 4 MG/1
4 TABLET, ORALLY DISINTEGRATING ORAL ONCE AS NEEDED
Status: DISCONTINUED | OUTPATIENT
Start: 2025-01-02 | End: 2025-01-02 | Stop reason: HOSPADM

## 2025-01-02 RX ORDER — LABETALOL HYDROCHLORIDE 5 MG/ML
5 INJECTION, SOLUTION INTRAVENOUS
Status: DISCONTINUED | OUTPATIENT
Start: 2025-01-02 | End: 2025-01-02 | Stop reason: HOSPADM

## 2025-01-02 RX ORDER — SODIUM CHLORIDE, SODIUM LACTATE, POTASSIUM CHLORIDE, CALCIUM CHLORIDE 600; 310; 30; 20 MG/100ML; MG/100ML; MG/100ML; MG/100ML
1000 INJECTION, SOLUTION INTRAVENOUS CONTINUOUS
Status: DISCONTINUED | OUTPATIENT
Start: 2025-01-02 | End: 2025-01-02 | Stop reason: HOSPADM

## 2025-01-02 RX ORDER — SODIUM CHLORIDE, SODIUM LACTATE, POTASSIUM CHLORIDE, CALCIUM CHLORIDE 600; 310; 30; 20 MG/100ML; MG/100ML; MG/100ML; MG/100ML
100 INJECTION, SOLUTION INTRAVENOUS CONTINUOUS
Status: DISCONTINUED | OUTPATIENT
Start: 2025-01-02 | End: 2025-01-02 | Stop reason: HOSPADM

## 2025-01-02 RX ORDER — HYDROMORPHONE HYDROCHLORIDE 1 MG/ML
0.5 INJECTION, SOLUTION INTRAMUSCULAR; INTRAVENOUS; SUBCUTANEOUS
Status: DISCONTINUED | OUTPATIENT
Start: 2025-01-02 | End: 2025-01-02 | Stop reason: HOSPADM

## 2025-01-02 RX ORDER — CEPHALEXIN 500 MG/1
500 CAPSULE ORAL 3 TIMES DAILY
Qty: 9 CAPSULE | Refills: 0 | Status: SHIPPED | OUTPATIENT
Start: 2025-01-02 | End: 2025-01-05

## 2025-01-02 RX ORDER — ONDANSETRON 2 MG/ML
INJECTION INTRAMUSCULAR; INTRAVENOUS AS NEEDED
Status: DISCONTINUED | OUTPATIENT
Start: 2025-01-02 | End: 2025-01-02 | Stop reason: SURG

## 2025-01-02 RX ORDER — FENTANYL CITRATE 50 UG/ML
50 INJECTION, SOLUTION INTRAMUSCULAR; INTRAVENOUS
Status: DISCONTINUED | OUTPATIENT
Start: 2025-01-02 | End: 2025-01-02 | Stop reason: HOSPADM

## 2025-01-02 RX ORDER — ACETAMINOPHEN 500 MG
1000 TABLET ORAL ONCE
Status: COMPLETED | OUTPATIENT
Start: 2025-01-02 | End: 2025-01-02

## 2025-01-02 RX ORDER — LIDOCAINE HYDROCHLORIDE 20 MG/ML
INJECTION, SOLUTION EPIDURAL; INFILTRATION; INTRACAUDAL; PERINEURAL AS NEEDED
Status: DISCONTINUED | OUTPATIENT
Start: 2025-01-02 | End: 2025-01-02 | Stop reason: SURG

## 2025-01-02 RX ORDER — SODIUM CHLORIDE 0.9 % (FLUSH) 0.9 %
3-10 SYRINGE (ML) INJECTION AS NEEDED
Status: DISCONTINUED | OUTPATIENT
Start: 2025-01-02 | End: 2025-01-02 | Stop reason: HOSPADM

## 2025-01-02 RX ORDER — NALOXONE HCL 0.4 MG/ML
0.04 VIAL (ML) INJECTION AS NEEDED
Status: DISCONTINUED | OUTPATIENT
Start: 2025-01-02 | End: 2025-01-02 | Stop reason: HOSPADM

## 2025-01-02 RX ORDER — DEXAMETHASONE SODIUM PHOSPHATE 4 MG/ML
INJECTION, SOLUTION INTRA-ARTICULAR; INTRALESIONAL; INTRAMUSCULAR; INTRAVENOUS; SOFT TISSUE AS NEEDED
Status: DISCONTINUED | OUTPATIENT
Start: 2025-01-02 | End: 2025-01-02 | Stop reason: SURG

## 2025-01-02 RX ADMIN — ONDANSETRON 4 MG: 2 INJECTION INTRAMUSCULAR; INTRAVENOUS at 07:25

## 2025-01-02 RX ADMIN — GENTAMICIN SULFATE 80 MG: 80 INJECTION, SOLUTION INTRAVENOUS at 06:22

## 2025-01-02 RX ADMIN — CEFAZOLIN 2000 MG: 2 INJECTION, POWDER, FOR SOLUTION INTRAMUSCULAR; INTRAVENOUS at 06:51

## 2025-01-02 RX ADMIN — ACETAMINOPHEN 1000 MG: 500 TABLET, FILM COATED ORAL at 06:41

## 2025-01-02 RX ADMIN — SODIUM CHLORIDE, POTASSIUM CHLORIDE, SODIUM LACTATE AND CALCIUM CHLORIDE 1000 ML: 600; 310; 30; 20 INJECTION, SOLUTION INTRAVENOUS at 06:10

## 2025-01-02 RX ADMIN — LIDOCAINE HYDROCHLORIDE 100 MG: 20 INJECTION, SOLUTION EPIDURAL; INFILTRATION; INTRACAUDAL; PERINEURAL at 07:03

## 2025-01-02 RX ADMIN — FENTANYL CITRATE 100 MCG: 50 INJECTION, SOLUTION INTRAMUSCULAR; INTRAVENOUS at 07:03

## 2025-01-02 RX ADMIN — PROPOFOL 90 MG: 10 INJECTION, EMULSION INTRAVENOUS at 07:03

## 2025-01-02 RX ADMIN — PROPOFOL 50 MG: 10 INJECTION, EMULSION INTRAVENOUS at 07:13

## 2025-01-02 RX ADMIN — ASPIRIN 81 MG: 81 TABLET, CHEWABLE ORAL at 06:41

## 2025-01-02 RX ADMIN — DEXAMETHASONE SODIUM PHOSPHATE 4 MG: 4 INJECTION, SOLUTION INTRAMUSCULAR; INTRAVENOUS at 07:25

## 2025-01-02 NOTE — ANESTHESIA POSTPROCEDURE EVALUATION
Patient: Francisco Sinclair    Procedure Summary       Date: 01/02/25 Room / Location: [facility] OR  /  [facility] OR    Anesthesia Start: 0658 Anesthesia Stop: 0733    Procedure: PROSTATE ULTRASOUND BIOPSY MRI FUSION WITH URONAV Diagnosis:       Abnormal MRI, pelvis      Elevated prostate specific antigen (PSA)      (Abnormal MRI, pelvis [R93.5])      (Elevated prostate specific antigen (PSA) [R97.20])    Surgeons: Campos Pat MD Provider: Jam Waite CRNA    Anesthesia Type: general ASA Status: 3            Anesthesia Type: general    Vitals  Vitals Value Taken Time   /87 01/02/25 0745   Temp 97.8 °F (36.6 °C) 01/02/25 0745   Pulse 87 01/02/25 0747   Resp 16 01/02/25 0745   SpO2 99 % 01/02/25 0747   Vitals shown include unfiled device data.        Post Anesthesia Care and Evaluation    Patient location during evaluation: PACU  Patient participation: complete - patient participated  Level of consciousness: awake and awake and alert  Pain score: 0  Pain management: adequate    Airway patency: patent  Anesthetic complications: No anesthetic complications  PONV Status: none  Cardiovascular status: acceptable  Respiratory status: acceptable  Hydration status: acceptable    Comments: Patient discharged according to acceptable Dustin score per RN assessment. See nursing records for further information.     Blood pressure 168/81, pulse 88, temperature 97.8 °F (36.6 °C), temperature source Temporal, resp. rate 16, SpO2 95%.

## 2025-01-02 NOTE — ANESTHESIA PREPROCEDURE EVALUATION
Anesthesia Evaluation     Patient summary reviewed   no history of anesthetic complications:   NPO Solid Status: > 8 hours             Airway   Mallampati: II  TM distance: >3 FB  Neck ROM: full  Dental    (+) upper dentures and lower dentures    Pulmonary    (+) ,sleep apnea  Cardiovascular   Exercise tolerance: excellent (>7 METS)    (+) hypertension, CAD, CABG, cardiac stents , hyperlipidemia      Neuro/Psych- negative ROS  GI/Hepatic/Renal/Endo    (+) obesity, GERD    Musculoskeletal     Abdominal    Substance History      OB/GYN          Other                          Anesthesia Plan    ASA 3     general     (Aspirin today )    Anesthetic plan, risks, benefits, and alternatives have been provided, discussed and informed consent has been obtained with: patient.

## 2025-01-02 NOTE — OP NOTE
Operative Summary    Francisco Sinclair  Date of Procedure: 1/2/2025    Pre-op Diagnosis:   Abnormal MRI, pelvis [R93.5]  Elevated prostate specific antigen (PSA) [R97.20]    Post-op Diagnosis:     Post-Op Diagnosis Codes:     * Abnormal MRI, pelvis [R93.5]     * Elevated prostate specific antigen (PSA) [R97.20]    Procedure/CPT® Codes:      Procedure(s):  PROSTATE ULTRASOUND BIOPSY MRI FUSION WITH URONAV    Surgeon(s):  Campos Pat MD    Anesthesia: General    Staff:   Circulator: Yuliana Hallman RN  Scrub Person: Partha Rivera Brittany    Indications for procedure:  79-year-old male who had an abnormal CT scan showing an enlarged prostate.  He underwent an MRI which showed 84 cc gland.  He had a large PI-RADS 5 lesion in the posterior prostate peripheral zone extending from the base to the apex with extensive extraprostatic extension as well as invasion into the seminal vesicles.  He is brought to the operating room for MRI fusion biopsy.    Findings:   Height (mm): 52.8  Width (mm): 65.9  Length (mm): 66.9  Volume (cc): 102  PSA density:  0.05        Procedure details:  Patient is taken to the operating room where he is given general.  He is then placed in left lateral decubitus position.  Previous MRI images are reviewed as they have been loaded into the UroNav system. The electromagnetic generator for probe detection is attached to the bed and positioned appropriately. Using the [employer] ultrasound, the transrectal probe is inserted to identify the mid portion of the prostate gland in the transverse image.  Measurements of the width and height of the gland are then obtained.  The multiplanar probe is then used to take sagittal images of the prostate and again measurement is taken of the length of the gland.  The prostate volume is calculated using height times with times length ×1/2 which is then compared to the volume of the MRI.    The pre-procedural MRI (along with the segmentation and  "targeting data as determined by the radiologist) selected for fusion biopsy is then overlaid \"fused\" to the 3D TRUS volume of the prostate constructed from a series of 2D TRUS images obtained by a \"sweep\" of the TRUS probe.  Both rigid and elastic registration is carried out using the UroNav software.    The region of interest is identified in the bilateral base mid and apex portion of the prostate.  The target is identified as drawn by the radiologist using the UroNav software that included the bulls eye with the  saggital setting of the multiplanar probe. 4 biopsies are taken from the region of interest and labeled as \"region of interest #1 \"on the pathology requisition.  The midportion was biopsied with the other biopsies being a few millimeters away toward the periphery of the lesion by adjusting the probe.        After biopsies of  the suspicious lesion(s) were completed, ultrasound-guided \"random \"biopsies were performed again using ultrasound guidance with the saggital setting of the multiplanar probe.  After the completion of the fusion guided biopsies I viewed the three-dimensional view of the prostate to sample both areas that were not biopsied from the UroNav guidance. The biopsy specimens were labeled with respect to location.  Specimens were then placed in formalin.    The systematic of both the random biopsies and those of the region of interest(s) were reviewed and felt to be appropriate sampling of each.  The probe was removed.    Estimated Blood Loss: <30 mL    Specimens:                Specimens       ID Source Type Tests Collected By Collected At Frozen?    A Prostate Tissue TISSUE PATHOLOGY EXAM   Campos Pat MD 1/2/25 0619     Description: RIGHT MID BASE    B Prostate Tissue TISSUE PATHOLOGY EXAM   Campos Pat MD 1/2/25 0619     Description: RIGHT LATERAL BASE    C Prostate Tissue TISSUE PATHOLOGY EXAM   Campos Pat MD 1/2/25 0619     Description: RIGHT MID " MEDIAL    D Prostate Tissue TISSUE PATHOLOGY EXAM   Campos Pat MD 1/2/25 0619     Description: RIGHT MID LATERAL    E Prostate Tissue TISSUE PATHOLOGY EXAM   Campos Pat MD 1/2/25 0619     Description: RIGHT MID APEX    F Prostate Tissue TISSUE PATHOLOGY EXAM   Campos Pat MD 1/2/25 0619     Description: RIGHT LATERAL APEX    G Prostate Tissue TISSUE PATHOLOGY EXAM   Campos Pat MD 1/2/25 0619     Description: LEFT MID BASE    H Prostate Tissue TISSUE PATHOLOGY EXAM   Campos Pat MD 1/2/25 0619     Description: LEFT LATERAL BASE    I Prostate Tissue TISSUE PATHOLOGY EXAM   Campos Pat MD 1/2/25 0619     Description: LEFT MID MEDIAL    J Prostate Tissue TISSUE PATHOLOGY EXAM   Campos Pat MD 1/2/25 0619     Description: LEFT MID LATERAL    K Prostate Tissue TISSUE PATHOLOGY EXAM   Campos Pat MD 1/2/25 0619     Description: LEFT MID APEX    L Prostate Tissue TISSUE PATHOLOGY EXAM   Campos Pat MD 1/2/25 0619     Description: LEFT LATERAL APEX    M Prostate Tissue TISSUE PATHOLOGY EXAM   Campos Pat MD 1/2/25 0620     Description: LESION 1 TARGET 1-4              Drains: None    Complications: none    Plan: Follow-up to be determined by biopsy results    Campos Pat MD     Date: 1/2/2025  Time: 07:33 CST

## 2025-01-02 NOTE — ANESTHESIA PROCEDURE NOTES
Airway  Urgency: elective    Date/Time: 1/2/2025 7:03 AM  Airway not difficult    General Information and Staff    Patient location during procedure: OR  CRNA/CAA: Jam Waite CRNA    Indications and Patient Condition  Indications for airway management: airway protection    Preoxygenated: yes  Mask difficulty assessment: 0 - not attempted    Final Airway Details  Final airway type: supraglottic airway      Successful airway: unique  Size 4  Airway Seal Pressure (cm H2O): 15     Number of attempts at approach: 1  Assessment: lips, teeth, and gum same as pre-op

## 2025-01-07 LAB
CYTO UR: NORMAL
LAB AP CASE REPORT: NORMAL
LAB AP SPECIAL STAINS: NORMAL
Lab: NORMAL
PATH REPORT.FINAL DX SPEC: NORMAL
PATH REPORT.GROSS SPEC: NORMAL

## 2025-01-09 ENCOUNTER — HOSPITAL ENCOUNTER (EMERGENCY)
Facility: HOSPITAL | Age: 80
Discharge: HOME | End: 2025-01-09
Attending: EMERGENCY MEDICINE
Payer: MEDICARE

## 2025-01-09 ENCOUNTER — TELEPHONE (OUTPATIENT)
Dept: UROLOGY | Facility: CLINIC | Age: 80
End: 2025-01-09
Payer: MEDICARE

## 2025-01-09 ENCOUNTER — APPOINTMENT (OUTPATIENT)
Dept: GENERAL RADIOLOGY | Facility: HOSPITAL | Age: 80
End: 2025-01-09
Payer: MEDICARE

## 2025-01-09 ENCOUNTER — APPOINTMENT (OUTPATIENT)
Dept: CT IMAGING | Facility: HOSPITAL | Age: 80
End: 2025-01-09
Payer: MEDICARE

## 2025-01-09 VITALS
HEIGHT: 69 IN | TEMPERATURE: 98.2 F | HEART RATE: 95 BPM | WEIGHT: 195 LBS | SYSTOLIC BLOOD PRESSURE: 138 MMHG | DIASTOLIC BLOOD PRESSURE: 68 MMHG | OXYGEN SATURATION: 100 % | RESPIRATION RATE: 20 BRPM | BODY MASS INDEX: 28.88 KG/M2

## 2025-01-09 DIAGNOSIS — N13.9 ACUTE URINARY OBSTRUCTION: Primary | ICD-10-CM

## 2025-01-09 DIAGNOSIS — C61 PROSTATE CANCER: Primary | ICD-10-CM

## 2025-01-09 DIAGNOSIS — R07.9 CHEST PAIN, UNSPECIFIED TYPE: ICD-10-CM

## 2025-01-09 LAB
ALBUMIN SERPL-MCNC: 4 G/DL (ref 3.5–5.2)
ALBUMIN/GLOB SERPL: 1.3 G/DL
ALP SERPL-CCNC: 67 U/L (ref 39–117)
ALT SERPL W P-5'-P-CCNC: 20 U/L (ref 1–41)
ANION GAP SERPL CALCULATED.3IONS-SCNC: 14 MMOL/L (ref 5–15)
APTT PPP: 31.9 SECONDS (ref 24.5–36)
AST SERPL-CCNC: 26 U/L (ref 1–40)
BACTERIA UR QL AUTO: ABNORMAL /HPF
BASOPHILS # BLD AUTO: 0.02 10*3/MM3 (ref 0–0.2)
BASOPHILS NFR BLD AUTO: 0.2 % (ref 0–1.5)
BILIRUB SERPL-MCNC: 1.4 MG/DL (ref 0–1.2)
BILIRUB UR QL STRIP: ABNORMAL
BUN SERPL-MCNC: 18 MG/DL (ref 8–23)
BUN/CREAT SERPL: 17 (ref 7–25)
CALCIUM SPEC-SCNC: 9.3 MG/DL (ref 8.6–10.5)
CHLORIDE SERPL-SCNC: 103 MMOL/L (ref 98–107)
CLARITY UR: ABNORMAL
CO2 SERPL-SCNC: 23 MMOL/L (ref 22–29)
COLOR UR: ABNORMAL
CREAT SERPL-MCNC: 1.06 MG/DL (ref 0.76–1.27)
DEPRECATED RDW RBC AUTO: 44 FL (ref 37–54)
EGFRCR SERPLBLD CKD-EPI 2021: 70.5 ML/MIN/1.73
EOSINOPHIL # BLD AUTO: 0.02 10*3/MM3 (ref 0–0.4)
EOSINOPHIL NFR BLD AUTO: 0.2 % (ref 0.3–6.2)
ERYTHROCYTE [DISTWIDTH] IN BLOOD BY AUTOMATED COUNT: 13.3 % (ref 12.3–15.4)
GEN 5 1HR TROPONIN T REFLEX: 40 NG/L
GLOBULIN UR ELPH-MCNC: 3.1 GM/DL
GLUCOSE SERPL-MCNC: 116 MG/DL (ref 65–99)
GLUCOSE UR STRIP-MCNC: NEGATIVE MG/DL
HCT VFR BLD AUTO: 41.2 % (ref 37.5–51)
HGB BLD-MCNC: 14.1 G/DL (ref 13–17.7)
HGB UR QL STRIP.AUTO: ABNORMAL
HYALINE CASTS UR QL AUTO: ABNORMAL /LPF
IMM GRANULOCYTES # BLD AUTO: 0.04 10*3/MM3 (ref 0–0.05)
IMM GRANULOCYTES NFR BLD AUTO: 0.4 % (ref 0–0.5)
INR PPP: 0.94 (ref 0.91–1.09)
KETONES UR QL STRIP: ABNORMAL
LEUKOCYTE ESTERASE UR QL STRIP.AUTO: ABNORMAL
LYMPHOCYTES # BLD AUTO: 1.37 10*3/MM3 (ref 0.7–3.1)
LYMPHOCYTES NFR BLD AUTO: 14.1 % (ref 19.6–45.3)
MCH RBC QN AUTO: 30.9 PG (ref 26.6–33)
MCHC RBC AUTO-ENTMCNC: 34.2 G/DL (ref 31.5–35.7)
MCV RBC AUTO: 90.2 FL (ref 79–97)
MONOCYTES # BLD AUTO: 0.86 10*3/MM3 (ref 0.1–0.9)
MONOCYTES NFR BLD AUTO: 8.8 % (ref 5–12)
NEUTROPHILS NFR BLD AUTO: 7.41 10*3/MM3 (ref 1.7–7)
NEUTROPHILS NFR BLD AUTO: 76.3 % (ref 42.7–76)
NITRITE UR QL STRIP: NEGATIVE
NRBC BLD AUTO-RTO: 0 /100 WBC (ref 0–0.2)
PH UR STRIP.AUTO: 5.5 [PH] (ref 5–8)
PLATELET # BLD AUTO: 166 10*3/MM3 (ref 140–450)
PMV BLD AUTO: 10.9 FL (ref 6–12)
POTASSIUM SERPL-SCNC: 4.2 MMOL/L (ref 3.5–5.2)
PROT SERPL-MCNC: 7.1 G/DL (ref 6–8.5)
PROT UR QL STRIP: ABNORMAL
PROTHROMBIN TIME: 13.1 SECONDS (ref 11.8–14.8)
RBC # BLD AUTO: 4.57 10*6/MM3 (ref 4.14–5.8)
RBC # UR STRIP: ABNORMAL /HPF
REF LAB TEST METHOD: ABNORMAL
SODIUM SERPL-SCNC: 140 MMOL/L (ref 136–145)
SP GR UR STRIP: 1.02 (ref 1–1.03)
SQUAMOUS #/AREA URNS HPF: ABNORMAL /HPF
TROPONIN T % DELTA: -9 %
TROPONIN T NUMERIC DELTA: -4 NG/L
TROPONIN T SERPL HS-MCNC: 44 NG/L
UROBILINOGEN UR QL STRIP: ABNORMAL
WBC # UR STRIP: ABNORMAL /HPF
WBC NRBC COR # BLD AUTO: 9.72 10*3/MM3 (ref 3.4–10.8)

## 2025-01-09 PROCEDURE — 71045 X-RAY EXAM CHEST 1 VIEW: CPT

## 2025-01-09 PROCEDURE — 85730 THROMBOPLASTIN TIME PARTIAL: CPT | Performed by: EMERGENCY MEDICINE

## 2025-01-09 PROCEDURE — 84484 ASSAY OF TROPONIN QUANT: CPT | Performed by: EMERGENCY MEDICINE

## 2025-01-09 PROCEDURE — 25510000001 IOPAMIDOL 61 % SOLUTION: Performed by: EMERGENCY MEDICINE

## 2025-01-09 PROCEDURE — 87086 URINE CULTURE/COLONY COUNT: CPT | Performed by: EMERGENCY MEDICINE

## 2025-01-09 PROCEDURE — 80053 COMPREHEN METABOLIC PANEL: CPT | Performed by: EMERGENCY MEDICINE

## 2025-01-09 PROCEDURE — 93005 ELECTROCARDIOGRAM TRACING: CPT | Performed by: EMERGENCY MEDICINE

## 2025-01-09 PROCEDURE — 74177 CT ABD & PELVIS W/CONTRAST: CPT

## 2025-01-09 PROCEDURE — 85025 COMPLETE CBC W/AUTO DIFF WBC: CPT | Performed by: EMERGENCY MEDICINE

## 2025-01-09 PROCEDURE — 99285 EMERGENCY DEPT VISIT HI MDM: CPT

## 2025-01-09 PROCEDURE — 51702 INSERT TEMP BLADDER CATH: CPT

## 2025-01-09 PROCEDURE — 81001 URINALYSIS AUTO W/SCOPE: CPT | Performed by: EMERGENCY MEDICINE

## 2025-01-09 PROCEDURE — 93010 ELECTROCARDIOGRAM REPORT: CPT | Performed by: STUDENT IN AN ORGANIZED HEALTH CARE EDUCATION/TRAINING PROGRAM

## 2025-01-09 PROCEDURE — 36415 COLL VENOUS BLD VENIPUNCTURE: CPT

## 2025-01-09 PROCEDURE — 85610 PROTHROMBIN TIME: CPT | Performed by: EMERGENCY MEDICINE

## 2025-01-09 PROCEDURE — 51798 US URINE CAPACITY MEASURE: CPT

## 2025-01-09 RX ORDER — LIDOCAINE HYDROCHLORIDE 20 MG/ML
JELLY TOPICAL ONCE
Status: COMPLETED | OUTPATIENT
Start: 2025-01-09 | End: 2025-01-09

## 2025-01-09 RX ORDER — HYDROCODONE BITARTRATE AND ACETAMINOPHEN 5; 325 MG/1; MG/1
1 TABLET ORAL EVERY 4 HOURS PRN
Qty: 10 TABLET | Refills: 0 | Status: SHIPPED | OUTPATIENT
Start: 2025-01-09

## 2025-01-09 RX ORDER — SODIUM CHLORIDE 0.9 % (FLUSH) 0.9 %
10 SYRINGE (ML) INJECTION AS NEEDED
Status: DISCONTINUED | OUTPATIENT
Start: 2025-01-09 | End: 2025-01-09 | Stop reason: HOSPADM

## 2025-01-09 RX ORDER — IOPAMIDOL 612 MG/ML
100 INJECTION, SOLUTION INTRAVASCULAR
Status: COMPLETED | OUTPATIENT
Start: 2025-01-09 | End: 2025-01-09

## 2025-01-09 RX ORDER — CEFDINIR 300 MG/1
300 CAPSULE ORAL 2 TIMES DAILY
Qty: 20 CAPSULE | Refills: 0 | Status: SHIPPED | OUTPATIENT
Start: 2025-01-09

## 2025-01-09 RX ADMIN — IOPAMIDOL 100 ML: 612 INJECTION, SOLUTION INTRAVENOUS at 10:52

## 2025-01-09 RX ADMIN — LIDOCAINE HYDROCHLORIDE: 20 JELLY TOPICAL at 11:04

## 2025-01-09 NOTE — TELEPHONE ENCOUNTER
Called and discussed pathology report with patient we will plan for PET/CT given Bright 4+5 = 9 high risk disease for staging.

## 2025-01-09 NOTE — ED PROVIDER NOTES
Subjective   History of Present Illness  Patient presents with a major complaint of inability to urinate properly.  He says that he had a prostate biopsy performed on January 2 and this has been a problem ever since.  He gets up multiple times during the night and only urinates a little bit and then goes back to bed and has to get up short time later.  He has not called anybody or notified about it because he was thinking he would get a call about his biopsy results.  He is now having a lot of pain in the suprapubic area because he cannot urinate properly.  Today he started having some sharp pain in the left side of his chest has been bothering him off and on this morning.  He does have a history of heart disease also.  He has noticed blood in his urine.  He has had some diarrhea he thinks but has not noticed any blood in it.    History provided by:  Patient   used: No    Urinary Retention  Location:  Suprapubic  Quality:  Can't urinate properly  Severity:  Severe  Onset quality:  Gradual  Duration:  7 days  Timing:  Constant  Progression:  Unchanged  Chronicity:  New  Associated symptoms: abdominal pain and chest pain    Associated symptoms: no congestion, no cough, no diarrhea, no ear pain, no fatigue, no fever, no headaches, no loss of consciousness, no myalgias, no nausea, no rash, no rhinorrhea, no shortness of breath, no sore throat, no vomiting and no wheezing        Review of Systems   Constitutional: Negative.  Negative for fatigue and fever.   HENT: Negative.  Negative for congestion, ear pain, rhinorrhea and sore throat.    Respiratory: Negative.  Negative for cough, shortness of breath and wheezing.    Cardiovascular:  Positive for chest pain.   Gastrointestinal:  Positive for abdominal pain. Negative for diarrhea, nausea and vomiting.   Genitourinary: Negative.    Musculoskeletal: Negative.  Negative for myalgias.   Skin: Negative.  Negative for rash.   Neurological: Negative.   Negative for loss of consciousness and headaches.   Psychiatric/Behavioral: Negative.     All other systems reviewed and are negative.      Past Medical History:   Diagnosis Date    CAD (coronary artery disease)     Diverticulosis     Elevated cholesterol     GERD (gastroesophageal reflux disease)     History of adenomatous polyp of colon     History of colon polyps     Hyperlipidemia     Hypertension        Allergies   Allergen Reactions    Rosuvastatin Myalgia     Muscle spasms       Past Surgical History:   Procedure Laterality Date    CARDIAC CATHETERIZATION      COLONOSCOPY  08/04/2014    Diverticulosis; Questionable polyp on the ileocecal valve-path showed fragments of benign small intestinal mucosa with submucosa, no adenomatous changes identified; One 3mm adenomatous polyp in the ascending colon; One 6mm adenomatous polyp in the hepatic flexure; Repeat 5 years    COLONOSCOPY  07/14/2010    1cm hyperplastic polyp in the distal transverse colon; Diverticulosis; Repeat 4-5 years    COLONOSCOPY  06/13/2007    Diverticulosis; Internal hemorrhoids; 7mm hyperplastic rectal polyp; Repeat 3 years    COLONOSCOPY N/A 07/31/2019    Two 4mm tubular adenomatous polyps in the transverse colon; Diverticulosis in the left colon; Repeat 5 years    COLONOSCOPY N/A 6/7/2024    Procedure: COLONOSCOPY WITH ANESTHESIA;  Surgeon: Ana Oliva MD;  Location: [facility] ENDOSCOPY;  Service: Gastroenterology;  Laterality: N/A;  preop; hx of polyps  postop diverticulosis; polyps   PCP Kurt Pichardo    CORONARY ARTERY BYPASS GRAFT  2003    [place], [place]    ENDOSCOPY N/A 6/7/2024    Procedure: ESOPHAGOGASTRODUODENOSCOPY WITH ANESTHESIA;  Surgeon: Ana Oliva MD;  Location: [facility] ENDOSCOPY;  Service: Gastroenterology;  Laterality: N/A;  preop; black stools;   postop  PCP Kurt Pichardo    FOOT SURGERY      PROSTATE BIOPSY N/A 1/2/2025    Procedure: PROSTATE ULTRASOUND BIOPSY MRI FUSION WITH URONAV;  Surgeon: Campos Pat MD;   Location: [facility] OR;  Service: Urology;  Laterality: N/A;       Family History   Problem Relation Age of Onset    Colon cancer Neg Hx     Colon polyps Neg Hx     Esophageal cancer Neg Hx     Liver cancer Neg Hx     Liver disease Neg Hx     Rectal cancer Neg Hx     Stomach cancer Neg Hx        Social History     Socioeconomic History    Marital status:    Tobacco Use    Smoking status: Former     Current packs/day: 0.00     Types: Cigarettes     Quit date: 2003     Years since quittin.5    Smokeless tobacco: Never   Vaping Use    Vaping status: Never Used   Substance and Sexual Activity    Alcohol use: No    Drug use: Defer    Sexual activity: Defer           Objective   Physical Exam  Vitals and nursing note reviewed.   Constitutional:       Appearance: Normal appearance.   HENT:      Head: Normocephalic and atraumatic.   Eyes:      Pupils: Pupils are equal, round, and reactive to light.   Cardiovascular:      Rate and Rhythm: Normal rate and regular rhythm.   Pulmonary:      Effort: Pulmonary effort is normal.      Breath sounds: Normal breath sounds.   Abdominal:      General: Abdomen is flat.      Palpations: Abdomen is soft.      Tenderness: There is abdominal tenderness.      Comments: There is suprapubic tenderness noted.  There is no rebound or percussion tenderness noted.   Musculoskeletal:         General: Normal range of motion.   Skin:     General: Skin is warm and dry.   Neurological:      General: No focal deficit present.      Mental Status: He is alert and oriented to person, place, and time.   Psychiatric:         Mood and Affect: Mood normal.         Behavior: Behavior normal.         Procedures           ED Course  ED Course as of 25 1304   Thu 2025   1110 I did check with urology and was told it was okay to put a catheter in this patient. [TR]   1130 Catheter has been inserted and is relieved well over 1000 cc of dark-colored urine.  The patient is feeling much better  at the present time. [TR]      ED Course User Index  [TR] Abdulaziz Simsm Jr., MD                                                       Medical Decision Making  Placement of the catheter has relieved the patient of his obstruction and gotten rid of his pain in his abdomen.  I told him he needs to leave that in until sees urology next week.  I spoke with the urology midlevel earlier and she said they would see him next week to do a study and get this out hopefully.  His chest pain is gone now also.  His cardiac enzymes are elevated but they were stable so it does not show any signs of cardiac injury.  I did show him the copy of the CT done today.  There are lytic lesions that are going and some other changes there that are concerning for metastatic cancer.  He is aware of that and will follow-up with his doctor and with urology.  He is discharged in stable Condition at this time.    Problems Addressed:  Acute urinary obstruction: complicated acute illness or injury  Chest pain, unspecified type: complicated acute illness or injury    Amount and/or Complexity of Data Reviewed  Labs: ordered.  Radiology: ordered.  ECG/medicine tests: ordered.    Risk  Prescription drug management.        Final diagnoses:   Acute urinary obstruction   Chest pain, unspecified type       ED Disposition  ED Disposition       ED Disposition   Discharge    Condition   Stable    Comment   --               Campos Pat MD  [address] [address], [place] [id number] [phone]    Schedule an appointment as soon as possible for a visit in 1 week           Medication List        New Prescriptions      cefdinir 300 MG capsule  Commonly known as: OMNICEF  Take 1 capsule by mouth 2 (Two) Times a Day.     HYDROcodone-acetaminophen 5-325 MG per tablet  Commonly known as: NORCO  Take 1 tablet by mouth Every 4 (Four) Hours As Needed for Moderate Pain.               Where to Get Your Medications        These medications were  sent to [facility] [address] [place] [address] [address] [facility] - [phone]  - [phone]   [address] [facility] [address]      Phone: [phone]   cefdinir 300 MG capsule  HYDROcodone-acetaminophen 5-325 MG per tablet            Abdulaziz Simms Jr., MD  01/09/25 0932

## 2025-01-10 LAB
BACTERIA SPEC AEROBE CULT: NO GROWTH
QT INTERVAL: 346 MS
QTC INTERVAL: 437 MS

## 2025-01-13 NOTE — PROGRESS NOTES
Subjective    Mr. Sinclair is 79 y.o. male    Chief Complaint: Voiding Trial    History of Present Illness    79 year-old established patient with history of elevated PSA, gross hematuria, urinary hesitancy, and an abnormal CT of the abdomen and pelvis. He is here today for a voiding trial following a recent ER visit for urinary retention. He states that he has struggled with urinary hesitancy for many years but on 1/9/2025 he was unable to urinate and was having severe bladder pain. He presented to the ED and was placed with an indwelling lilly catheter after his bladder scan showed greater than 1 lt of urine. He most recently had a prostate biopsy MRI fusion with uronav for an abnormal MRI showing a large PI-RADs 5 lesion in the posterior prostate peripheral zone extending from base to apex with a prostate volume of 84.6 cc. The pathology from his biopsy showed [place] 4+5=9 high risk disease which he was informed by Dr. Pat who recommends having a PET/CT completed for staging.  He reports still with slight suprapubic tenderness and diarrhea. He denies fever, chills, or gross hematuria.       The following portions of the patient's history were reviewed and updated as appropriate: allergies, current medications, past family history, past medical history, past social history, past surgical history and problem list.    Review of Systems      Current Outpatient Medications:     amLODIPine (NORVASC) 5 MG tablet, Take 1 tablet by mouth Daily., Disp: , Rfl:     Aspirin 81 MG capsule, Take 81 mg by mouth Daily., Disp: , Rfl:     atorvastatin (LIPITOR) 80 MG tablet, Take 0.5 tablets by mouth Every Night., Disp: , Rfl:     busPIRone (BUSPAR) 10 MG tablet, Take 1 tablet by mouth Every Night., Disp: , Rfl:     carvedilol (COREG) 12.5 MG tablet, Take 0.5 tablets by mouth Daily., Disp: , Rfl:     Coenzyme Q10 (Co Q 10) 100 MG capsule, Take 200 mg by mouth Daily., Disp: , Rfl:     dipyridamole (PERSANTINE) 75 MG tablet, Take 1  tablet by mouth 3 (Three) Times a Day., Disp: , Rfl:     Flomax 0.4 MG capsule 24 hr capsule, 1 capsule Daily., Disp: , Rfl:     lisinopril (PRINIVIL,ZESTRIL) 10 MG tablet, Take 1 tablet by mouth Daily., Disp: , Rfl:     multivitamin with minerals (Multivitamin Adults) tablet tablet, Take 1 tablet by mouth Daily., Disp: , Rfl:     Omega-3 Fatty Acids (FISH OIL) 1000 MG capsule capsule, Take 1 capsule by mouth Daily With Breakfast., Disp: , Rfl:     omeprazole (priLOSEC) 20 MG capsule, Take 1 capsule by mouth Daily., Disp: , Rfl:     pantoprazole (PROTONIX) 40 MG EC tablet, Take 1 tablet by mouth Daily., Disp: , Rfl:     ranolazine (RANEXA) 500 MG 12 hr tablet, Take 1 tablet by mouth 2 (Two) Times a Day., Disp: , Rfl:     cefdinir (OMNICEF) 300 MG capsule, Take 1 capsule by mouth 2 (Two) Times a Day. (Patient not taking: Reported on 1/15/2025), Disp: 20 capsule, Rfl: 0    HYDROcodone-acetaminophen (NORCO) 5-325 MG per tablet, Take 1 tablet by mouth Every 4 (Four) Hours As Needed for Moderate Pain. (Patient not taking: Reported on 1/15/2025), Disp: 10 tablet, Rfl: 0    ketorolac (TORADOL) 10 MG tablet, Take 1 tablet by mouth Every 6 (Six) Hours As Needed for Moderate Pain (post op pain)., Disp: 10 tablet, Rfl: 0    Past Medical History:   Diagnosis Date    CAD (coronary artery disease)     Diverticulosis     Elevated cholesterol     GERD (gastroesophageal reflux disease)     History of adenomatous polyp of colon     History of colon polyps     Hyperlipidemia     Hypertension        Past Surgical History:   Procedure Laterality Date    CARDIAC CATHETERIZATION      COLONOSCOPY  08/04/2014    Diverticulosis; Questionable polyp on the ileocecal valve-path showed fragments of benign small intestinal mucosa with submucosa, no adenomatous changes identified; One 3mm adenomatous polyp in the ascending colon; One 6mm adenomatous polyp in the hepatic flexure; Repeat 5 years    COLONOSCOPY  07/14/2010    1cm hyperplastic polyp  "in the distal transverse colon; Diverticulosis; Repeat 4-5 years    COLONOSCOPY  2007    Diverticulosis; Internal hemorrhoids; 7mm hyperplastic rectal polyp; Repeat 3 years    COLONOSCOPY N/A 2019    Two 4mm tubular adenomatous polyps in the transverse colon; Diverticulosis in the left colon; Repeat 5 years    COLONOSCOPY N/A 2024    Procedure: COLONOSCOPY WITH ANESTHESIA;  Surgeon: Ana Oliva MD;  Location: [facility] ENDOSCOPY;  Service: Gastroenterology;  Laterality: N/A;  preop; hx of polyps  postop diverticulosis; polyps   PCP Kurt Pichardo    CORONARY ARTERY BYPASS GRAFT      [place], [place]    ENDOSCOPY N/A 2024    Procedure: ESOPHAGOGASTRODUODENOSCOPY WITH ANESTHESIA;  Surgeon: Ana Oliva MD;  Location: [facility] ENDOSCOPY;  Service: Gastroenterology;  Laterality: N/A;  preop; black stools;   postop  PCP Kurt Pichardo    FOOT SURGERY      PROSTATE BIOPSY N/A 2025    Procedure: PROSTATE ULTRASOUND BIOPSY MRI FUSION WITH URONAV;  Surgeon: Campos Pat MD;  Location: [facility] OR;  Service: Urology;  Laterality: N/A;       Social History     Socioeconomic History    Marital status:    Tobacco Use    Smoking status: Former     Current packs/day: 0.00     Types: Cigarettes     Quit date: 2003     Years since quittin.5    Smokeless tobacco: Never   Vaping Use    Vaping status: Never Used   Substance and Sexual Activity    Alcohol use: No    Drug use: Defer    Sexual activity: Defer       Family History   Problem Relation Age of Onset    Colon cancer Neg Hx     Colon polyps Neg Hx     Esophageal cancer Neg Hx     Liver cancer Neg Hx     Liver disease Neg Hx     Rectal cancer Neg Hx     Stomach cancer Neg Hx        Objective    Temp 96.8 °F (36 °C) (Infrared)   Ht 174 cm (68.5\")   Wt 88.5 kg (195 lb)   BMI 29.22 kg/m²     Physical Exam    Constitutional:No apparent distress; vitals reviewed as above  Psychiatric: Appropriate affect; alert and " oriented  Musculoskeletal: Normal gait and station  Respiratory: No distress; unlabored movement; no accessory musculature needed with symmetric movements  Skin: No pallor or diaphoresis      Results for orders placed or performed during the hospital encounter of 01/09/25   ECG 12 Lead Chest Pain    Collection Time: 01/09/25 10:10 AM   Result Value Ref Range    QT Interval 346 ms    QTC Interval 437 ms   Comprehensive Metabolic Panel    Collection Time: 01/09/25 10:23 AM    Specimen: Blood   Result Value Ref Range    Glucose 116 (H) 65 - 99 mg/dL    BUN 18 8 - 23 mg/dL    Creatinine 1.06 0.76 - 1.27 mg/dL    Sodium 140 136 - 145 mmol/L    Potassium 4.2 3.5 - 5.2 mmol/L    Chloride 103 98 - 107 mmol/L    CO2 23.0 22.0 - 29.0 mmol/L    Calcium 9.3 8.6 - 10.5 mg/dL    Total Protein 7.1 6.0 - 8.5 g/dL    Albumin 4.0 3.5 - 5.2 g/dL    ALT (SGPT) 20 1 - 41 U/L    AST (SGOT) 26 1 - 40 U/L    Alkaline Phosphatase 67 39 - 117 U/L    Total Bilirubin 1.4 (H) 0.0 - 1.2 mg/dL    Globulin 3.1 gm/dL    A/G Ratio 1.3 g/dL    BUN/Creatinine Ratio 17.0 7.0 - 25.0    Anion Gap 14.0 5.0 - 15.0 mmol/L    eGFR 70.5 >60.0 mL/min/1.73   Protime-INR    Collection Time: 01/09/25 10:23 AM    Specimen: Blood   Result Value Ref Range    Protime 13.1 11.8 - 14.8 Seconds    INR 0.94 0.91 - 1.09   aPTT    Collection Time: 01/09/25 10:23 AM    Specimen: Blood   Result Value Ref Range    PTT 31.9 24.5 - 36.0 seconds   High Sensitivity Troponin T    Collection Time: 01/09/25 10:23 AM    Specimen: Blood   Result Value Ref Range    HS Troponin T 44 (H) <22 ng/L   CBC Auto Differential    Collection Time: 01/09/25 10:23 AM    Specimen: Blood   Result Value Ref Range    WBC 9.72 3.40 - 10.80 10*3/mm3    RBC 4.57 4.14 - 5.80 10*6/mm3    Hemoglobin 14.1 13.0 - 17.7 g/dL    Hematocrit 41.2 37.5 - 51.0 %    MCV 90.2 79.0 - 97.0 fL    MCH 30.9 26.6 - 33.0 pg    MCHC 34.2 31.5 - 35.7 g/dL    RDW 13.3 12.3 - 15.4 %    RDW-SD 44.0 37.0 - 54.0 fl    MPV 10.9 6.0  - 12.0 fL    Platelets 166 140 - 450 10*3/mm3    Neutrophil % 76.3 (H) 42.7 - 76.0 %    Lymphocyte % 14.1 (L) 19.6 - 45.3 %    Monocyte % 8.8 5.0 - 12.0 %    Eosinophil % 0.2 (L) 0.3 - 6.2 %    Basophil % 0.2 0.0 - 1.5 %    Immature Grans % 0.4 0.0 - 0.5 %    Neutrophils, Absolute 7.41 (H) 1.70 - 7.00 10*3/mm3    Lymphocytes, Absolute 1.37 0.70 - 3.10 10*3/mm3    Monocytes, Absolute 0.86 0.10 - 0.90 10*3/mm3    Eosinophils, Absolute 0.02 0.00 - 0.40 10*3/mm3    Basophils, Absolute 0.02 0.00 - 0.20 10*3/mm3    Immature Grans, Absolute 0.04 0.00 - 0.05 10*3/mm3    nRBC 0.0 0.0 - 0.2 /100 WBC   Urine Culture - Urine, Urine, Catheter    Collection Time: 01/09/25 11:44 AM    Specimen: Urine, Catheter   Result Value Ref Range    Urine Culture No growth    High Sensitivity Troponin T 1Hr    Collection Time: 01/09/25 11:44 AM    Specimen: Arm, Left; Blood   Result Value Ref Range    HS Troponin T 40 (H) <22 ng/L    Troponin T Numeric Delta -4 ng/L    Troponin T % Delta -9 Abnormal if >/= 20% %   Urinalysis With Culture If Indicated - Urine, Catheter    Collection Time: 01/09/25 11:44 AM    Specimen: Urine, Catheter   Result Value Ref Range    Color, UA Dark Yellow (A) Yellow, Straw    Appearance, UA Cloudy (A) Clear    pH, UA 5.5 5.0 - 8.0    Specific Gravity, UA 1.025 1.005 - 1.030    Glucose, UA Negative Negative    Ketones, UA Trace (A) Negative    Bilirubin, UA Small (1+) (A) Negative    Blood, UA Large (3+) (A) Negative    Protein, UA 30 mg/dL (1+) (A) Negative    Leuk Esterase, UA Large (3+) (A) Negative    Nitrite, UA Negative Negative    Urobilinogen, UA 0.2 E.U./dL 0.2 - 1.0 E.U./dL   Urinalysis, Microscopic Only - Urine, Catheter    Collection Time: 01/09/25 11:44 AM    Specimen: Urine, Catheter   Result Value Ref Range    RBC, UA 6-10 (A) None Seen, 0-2 /HPF    WBC, UA 21-50 (A) None Seen, 0-2 /HPF    Bacteria, UA 1+ (A) None Seen /HPF    Squamous Epithelial Cells, UA 0-2 None Seen, 0-2 /HPF    Hyaline Casts, UA  None Seen None Seen /LPF    Methodology Manual Light Microscopy          Assessment and Plan       Diagnoses and all orders for this visit:    1. Elevated prostate specific antigen (PSA) (Primary)  -     POC Urinalysis Dipstick, Multipro  -     ketorolac (TORADOL) 10 MG tablet; Take 1 tablet by mouth Every 6 (Six) Hours As Needed for Moderate Pain (post op pain).  Dispense: 10 tablet; Refill: 0    2. Prostate cancer    3. Urinary retention    Lilly catheter removed without difficulty. \Educated patient that he will need to continue to hydrate and should urinated within the next 2-3 hours, if he is unable to urinate then he needs to call the office to come in and have lilly catheter replaced. Patient educated that if it is after hours and he is not able to urinate then he would need to go to the ED. Patient voiced understanding. Patient requesting refill on pain medication, will have patient stop the Norco and will send in short script for Toradol for moderate pain. He states that he has scheduled his PET/CT scan for the 29th. Patient will follow up with Dr. Pat based on these results.

## 2025-01-15 ENCOUNTER — TELEPHONE (OUTPATIENT)
Dept: UROLOGY | Facility: CLINIC | Age: 80
End: 2025-01-15
Payer: MEDICARE

## 2025-01-15 ENCOUNTER — OFFICE VISIT (OUTPATIENT)
Dept: UROLOGY | Facility: CLINIC | Age: 80
End: 2025-01-15
Payer: MEDICARE

## 2025-01-15 VITALS — BODY MASS INDEX: 28.88 KG/M2 | WEIGHT: 195 LBS | TEMPERATURE: 96.8 F | HEIGHT: 69 IN

## 2025-01-15 DIAGNOSIS — R97.20 ELEVATED PROSTATE SPECIFIC ANTIGEN (PSA): Primary | ICD-10-CM

## 2025-01-15 DIAGNOSIS — C61 PROSTATE CANCER: ICD-10-CM

## 2025-01-15 DIAGNOSIS — R33.9 URINARY RETENTION: ICD-10-CM

## 2025-01-15 RX ORDER — KETOROLAC TROMETHAMINE 10 MG/1
10 TABLET, FILM COATED ORAL EVERY 6 HOURS PRN
Qty: 10 TABLET | Refills: 0 | Status: SHIPPED | OUTPATIENT
Start: 2025-01-15

## 2025-01-15 NOTE — TELEPHONE ENCOUNTER
I called patient to give him his appointment with Dr. Pat on 2/3 at 11 am. There was no answer or voicemail. We will send a [employer] message as well.

## 2025-01-16 ENCOUNTER — TELEPHONE (OUTPATIENT)
Dept: UROLOGY | Facility: CLINIC | Age: 80
End: 2025-01-16
Payer: MEDICARE

## 2025-01-16 ENCOUNTER — PROCEDURE VISIT (OUTPATIENT)
Dept: UROLOGY | Facility: CLINIC | Age: 80
End: 2025-01-16
Payer: MEDICARE

## 2025-01-16 VITALS — HEART RATE: 80 BPM | SYSTOLIC BLOOD PRESSURE: 110 MMHG | OXYGEN SATURATION: 98 % | DIASTOLIC BLOOD PRESSURE: 72 MMHG

## 2025-01-16 DIAGNOSIS — R33.9 URINARY RETENTION: Primary | ICD-10-CM

## 2025-01-16 LAB
BILIRUB BLD-MCNC: ABNORMAL MG/DL
CLARITY, POC: CLEAR
COLOR UR: YELLOW
GLUCOSE UR STRIP-MCNC: NEGATIVE MG/DL
KETONES UR QL: NEGATIVE
LEUKOCYTE EST, POC: NEGATIVE
NITRITE UR-MCNC: NEGATIVE MG/ML
PH UR: 5.5 [PH] (ref 5–8)
PROT UR STRIP-MCNC: ABNORMAL MG/DL
RBC # UR STRIP: NEGATIVE /UL
SP GR UR: 1.03 (ref 1–1.03)
UROBILINOGEN UR QL: ABNORMAL

## 2025-01-16 NOTE — PROGRESS NOTES
Subjective    Mr. Sinclair is 79 y.o. male    Chief Complaint: Voiding Trial    History of Present Illness  Denies flank pain, hematuria, fevers.   79 year-old established patient with history of elevated PSA, gross hematuria, urinary hesitancy, and an abnormal CT of the abdomen and pelvis. He is here today for a voiding trial after failing previous voiding trial after catheter insertion in the ED. He states that he has struggled with urinary hesitancy for many years but on 1/9/2025 he was unable to urinate and was having severe bladder pain. He presented to the ED and was placed with an indwelling lilly catheter after his bladder scan showed greater than 1 lt of urine. He most recently had a prostate biopsy MRI fusion with uronav for an abnormal MRI showing a large PI-RADs 5 lesion in the posterior prostate peripheral zone extending from base to apex with a prostate volume of 84.6 cc. The pathology from his biopsy showed Bright 4+5=9 high risk disease which he was informed by Dr. Pat who recommends having a PET/CT completed for staging.  Patient comfortable today. He denies flank pain, suprapubic pain, or fever.      The following portions of the patient's history were reviewed and updated as appropriate: allergies, current medications, past family history, past medical history, past social history, past surgical history and problem list.    Review of Systems      Current Outpatient Medications:     amLODIPine (NORVASC) 5 MG tablet, Take 1 tablet by mouth Daily., Disp: , Rfl:     Aspirin 81 MG capsule, Take 81 mg by mouth Daily., Disp: , Rfl:     atorvastatin (LIPITOR) 80 MG tablet, Take 0.5 tablets by mouth Every Night., Disp: , Rfl:     busPIRone (BUSPAR) 10 MG tablet, Take 1 tablet by mouth Every Night., Disp: , Rfl:     carvedilol (COREG) 12.5 MG tablet, Take 0.5 tablets by mouth Daily., Disp: , Rfl:     Coenzyme Q10 (Co Q 10) 100 MG capsule, Take 200 mg by mouth Daily., Disp: , Rfl:     dipyridamole (PERSANTINE)  75 MG tablet, Take 1 tablet by mouth 3 (Three) Times a Day., Disp: , Rfl:     Flomax 0.4 MG capsule 24 hr capsule, 1 capsule Daily., Disp: , Rfl:     ketorolac (TORADOL) 10 MG tablet, Take 1 tablet by mouth Every 6 (Six) Hours As Needed for Moderate Pain (post op pain)., Disp: 10 tablet, Rfl: 0    lisinopril (PRINIVIL,ZESTRIL) 10 MG tablet, Take 1 tablet by mouth Daily., Disp: , Rfl:     multivitamin with minerals (Multivitamin Adults) tablet tablet, Take 1 tablet by mouth Daily., Disp: , Rfl:     Omega-3 Fatty Acids (FISH OIL) 1000 MG capsule capsule, Take 1 capsule by mouth Daily With Breakfast., Disp: , Rfl:     omeprazole (priLOSEC) 20 MG capsule, Take 1 capsule by mouth Daily., Disp: , Rfl:     pantoprazole (PROTONIX) 40 MG EC tablet, Take 1 tablet by mouth Daily., Disp: , Rfl:     ranolazine (RANEXA) 500 MG 12 hr tablet, Take 1 tablet by mouth 2 (Two) Times a Day., Disp: , Rfl:     cefdinir (OMNICEF) 300 MG capsule, Take 1 capsule by mouth 2 (Two) Times a Day. (Patient not taking: Reported on 1/23/2025), Disp: 20 capsule, Rfl: 0    HYDROcodone-acetaminophen (NORCO) 5-325 MG per tablet, Take 1 tablet by mouth Every 4 (Four) Hours As Needed for Moderate Pain. (Patient not taking: Reported on 1/23/2025), Disp: 10 tablet, Rfl: 0    Past Medical History:   Diagnosis Date    CAD (coronary artery disease)     Diverticulosis     Elevated cholesterol     GERD (gastroesophageal reflux disease)     History of adenomatous polyp of colon     History of colon polyps     Hyperlipidemia     Hypertension        Past Surgical History:   Procedure Laterality Date    CARDIAC CATHETERIZATION      COLONOSCOPY  08/04/2014    Diverticulosis; Questionable polyp on the ileocecal valve-path showed fragments of benign small intestinal mucosa with submucosa, no adenomatous changes identified; One 3mm adenomatous polyp in the ascending colon; One 6mm adenomatous polyp in the hepatic flexure; Repeat 5 years    COLONOSCOPY  07/14/2010    1cm  hyperplastic polyp in the distal transverse colon; Diverticulosis; Repeat 4-5 years    COLONOSCOPY  2007    Diverticulosis; Internal hemorrhoids; 7mm hyperplastic rectal polyp; Repeat 3 years    COLONOSCOPY N/A 2019    Two 4mm tubular adenomatous polyps in the transverse colon; Diverticulosis in the left colon; Repeat 5 years    COLONOSCOPY N/A 2024    Procedure: COLONOSCOPY WITH ANESTHESIA;  Surgeon: Ana Oliva MD;  Location: [facility] ENDOSCOPY;  Service: Gastroenterology;  Laterality: N/A;  preop; hx of polyps  postop diverticulosis; polyps   PCP Kurt Pichardo    CORONARY ARTERY BYPASS GRAFT      [place], [place]    ENDOSCOPY N/A 2024    Procedure: ESOPHAGOGASTRODUODENOSCOPY WITH ANESTHESIA;  Surgeon: Ana Oliva MD;  Location: [facility] ENDOSCOPY;  Service: Gastroenterology;  Laterality: N/A;  preop; black stools;   postop  PCP Kurt Pichardo    FOOT SURGERY      PROSTATE BIOPSY N/A 2025    Procedure: PROSTATE ULTRASOUND BIOPSY MRI FUSION WITH URONAV;  Surgeon: Campos Pat MD;  Location: [facility] OR;  Service: Urology;  Laterality: N/A;       Social History     Socioeconomic History    Marital status:    Tobacco Use    Smoking status: Former     Current packs/day: 0.00     Types: Cigarettes     Quit date: 2003     Years since quittin.5    Smokeless tobacco: Never   Vaping Use    Vaping status: Never Used   Substance and Sexual Activity    Alcohol use: No    Drug use: Defer    Sexual activity: Defer       Family History   Problem Relation Age of Onset    Colon cancer Neg Hx     Colon polyps Neg Hx     Esophageal cancer Neg Hx     Liver cancer Neg Hx     Liver disease Neg Hx     Rectal cancer Neg Hx     Stomach cancer Neg Hx        Objective    There were no vitals taken for this visit.    Physical Exam    Constitutional:No apparent distress; vitals reviewed as above  Psychiatric: Appropriate affect; alert and oriented  Musculoskeletal: Normal gait and  station  Respiratory: No distress; unlabored movement; no accessory musculature needed with symmetric movements  Skin: No pallor or diaphoresis      Results for orders placed or performed in visit on 01/16/25   POC Urinalysis Dipstick, Multipro    Collection Time: 01/16/25 11:49 AM    Specimen: Urine   Result Value Ref Range    Color Yellow Yellow, Straw, Dark Yellow, Aliza    Clarity, UA Clear Clear    Glucose, UA Negative Negative mg/dL    Bilirubin Small (1+) (A) Negative    Ketones, UA Negative Negative    Specific Gravity  1.030 1.005 - 1.030    Blood, UA Negative Negative    pH, Urine 5.5 5.0 - 8.0    Protein, POC 30 mg/dL (A) Negative mg/dL    Urobilinogen, UA 0.2 E.U./dL Normal, 0.2 E.U./dL    Nitrite, UA Negative Negative    Leukocytes Negative Negative     Assessment and Plan      Diagnoses and all orders for this visit:    1. Urinary retention (Primary)    2. Prostate cancer        Lilly catheter removed without difficulty. Educated patient that he will need to continue to hydrate and should urinated within the next 2-3 hours, if he is unable to urinate then he needs to call the office to come in and have lilly catheter replaced. Patient educated that if it is after hours and he is not able to urinate then he would need to go to the ED. Patient voiced understanding. Discussed that if he is unable to urinate then a lilly catheter will be reinserted depending on his bladder scan and be left until he follows up with Dr. Pat on 2/3/25.

## 2025-01-16 NOTE — PROGRESS NOTES
"Pt. Of Dr. Pat, saw Sabrina in clinic yesterday. Pt. States he has only been able to void small amounts since having his catheter removed in office yesterday. Pt. Was advised to present to clinic for a bladder scan. Upon arrival bladder scan showed 576 ml. Pt. Then voided, stating it was \"large drops but not a steady stream\". PVR bladder scan showed 511 ml. Discussed with Sabrina. Will place a lilly and have pt. Return to clinic next week for repeat voiding trial as pt. Has only failed one voiding trial thus far. Pt. Cleaned with betadine and new 18 fr coude lilly placed without difficulty. 700 ml of dark harsh urine drained from pt.'s bladder. Urine collected for UA. Dipstick ran, no indications of infection so was not sent for culture. 10 cc of sterile water instilled in to catheter balloon and catheter connected to a leg bag. Pt. Advised to f/u next week as scheduled. Sabrina RAMOS in office at the time of procedure. SUJEY Troy RN   "

## 2025-01-16 NOTE — TELEPHONE ENCOUNTER
Patient called and stated he had catheter removed and was instructed to let us know if he was unable to urinate, patient did not urinate all yesterday but started a stream today but not a steady stream, after speaking with another MA, we advised patient to come in for a nurse visit to be bladder scanned so we can see if he is retaining in his bladder or fully emptying out.

## 2025-01-20 NOTE — PROGRESS NOTES
"Subjective    Mr. Sinclair is 79 y.o. male    Chief Complaint: Cancer Consult    History of Present Illness  Patient here to discuss prostate cancer.  Patient underwent a MRI fusion biopsy 1/2/2025 which revealed [place] 4+ 5 = adenocarcinoma the prostate 13/13 cores.  .  This was done because of a grossly abnormal digital rectal exam.  PSA 7.36.  PET/CT 1/29/2025 shows concerning ruben involvement in the pelvis as well as mediastinum left axillary and left subclavicular regions.  In addition there are several foci of abnormal tracer uptake with a sclerotic lesion located the right anterior seventh rib consistent with bony metastatic disease.  Patient has been catheter dependent since biopsy with multiple failed voiding trials.     No results found for: \"PSA\"    The following portions of the patient's history were reviewed and updated as appropriate: allergies, current medications, past family history, past medical history, past social history, past surgical history and problem list.    Review of Systems      Current Outpatient Medications:     amLODIPine (NORVASC) 5 MG tablet, Take 1 tablet by mouth Daily., Disp: , Rfl:     Aspirin 81 MG capsule, Take 81 mg by mouth Daily., Disp: , Rfl:     atorvastatin (LIPITOR) 80 MG tablet, Take 0.5 tablets by mouth Every Night., Disp: , Rfl:     busPIRone (BUSPAR) 10 MG tablet, Take 1 tablet by mouth Every Night., Disp: , Rfl:     carvedilol (COREG) 12.5 MG tablet, Take 0.5 tablets by mouth Daily., Disp: , Rfl:     Coenzyme Q10 (Co Q 10) 100 MG capsule, Take 200 mg by mouth Daily., Disp: , Rfl:     dipyridamole (PERSANTINE) 75 MG tablet, Take 1 tablet by mouth 3 (Three) Times a Day., Disp: , Rfl:     Flomax 0.4 MG capsule 24 hr capsule, 1 capsule Daily., Disp: , Rfl:     HYDROcodone-acetaminophen (NORCO) 5-325 MG per tablet, Take 1 tablet by mouth Every 4 (Four) Hours As Needed for Moderate Pain., Disp: 10 tablet, Rfl: 0    ketorolac (TORADOL) 10 MG tablet, Take 1 tablet by mouth " Every 6 (Six) Hours As Needed for Moderate Pain (post op pain)., Disp: 10 tablet, Rfl: 0    lisinopril (PRINIVIL,ZESTRIL) 10 MG tablet, Take 1 tablet by mouth Daily., Disp: , Rfl:     multivitamin with minerals (Multivitamin Adults) tablet tablet, Take 1 tablet by mouth Daily., Disp: , Rfl:     Omega-3 Fatty Acids (FISH OIL) 1000 MG capsule capsule, Take 1 capsule by mouth Daily With Breakfast., Disp: , Rfl:     omeprazole (priLOSEC) 20 MG capsule, Take 1 capsule by mouth Daily., Disp: , Rfl:     pantoprazole (PROTONIX) 40 MG EC tablet, Take 1 tablet by mouth Daily., Disp: , Rfl:     ranolazine (RANEXA) 500 MG 12 hr tablet, Take 1 tablet by mouth 2 (Two) Times a Day., Disp: , Rfl:     Past Medical History:   Diagnosis Date    CAD (coronary artery disease)     Diverticulosis     Elevated cholesterol     GERD (gastroesophageal reflux disease)     History of adenomatous polyp of colon     History of colon polyps     Hyperlipidemia     Hypertension        Past Surgical History:   Procedure Laterality Date    CARDIAC CATHETERIZATION      COLONOSCOPY  08/04/2014    Diverticulosis; Questionable polyp on the ileocecal valve-path showed fragments of benign small intestinal mucosa with submucosa, no adenomatous changes identified; One 3mm adenomatous polyp in the ascending colon; One 6mm adenomatous polyp in the hepatic flexure; Repeat 5 years    COLONOSCOPY  07/14/2010    1cm hyperplastic polyp in the distal transverse colon; Diverticulosis; Repeat 4-5 years    COLONOSCOPY  06/13/2007    Diverticulosis; Internal hemorrhoids; 7mm hyperplastic rectal polyp; Repeat 3 years    COLONOSCOPY N/A 07/31/2019    Two 4mm tubular adenomatous polyps in the transverse colon; Diverticulosis in the left colon; Repeat 5 years    COLONOSCOPY N/A 6/7/2024    Procedure: COLONOSCOPY WITH ANESTHESIA;  Surgeon: Ana Oliva MD;  Location: [facility] ENDOSCOPY;  Service: Gastroenterology;  Laterality: N/A;  preop; hx of polyps  postop  diverticulosis; polyps   PCP Kurt Pichardo    CORONARY ARTERY BYPASS GRAFT      [place]    ENDOSCOPY N/A 2024    Procedure: ESOPHAGOGASTRODUODENOSCOPY WITH ANESTHESIA;  Surgeon: Ana Oliva MD;  Location: [facility] ENDOSCOPY;  Service: Gastroenterology;  Laterality: N/A;  preop; black stools;   postop  PCP Kurt Pichardo    FOOT SURGERY      PROSTATE BIOPSY N/A 2025    Procedure: PROSTATE ULTRASOUND BIOPSY MRI FUSION WITH URONAV;  Surgeon: Campos Pat MD;  Location: [facility] OR;  Service: Urology;  Laterality: N/A;       Social History     Socioeconomic History    Marital status:    Tobacco Use    Smoking status: Former     Current packs/day: 0.00     Types: Cigarettes     Quit date: 2003     Years since quittin.5    Smokeless tobacco: Never   Vaping Use    Vaping status: Never Used   Substance and Sexual Activity    Alcohol use: No    Drug use: Defer    Sexual activity: Defer       Family History   Problem Relation Age of Onset    Colon cancer Neg Hx     Colon polyps Neg Hx     Esophageal cancer Neg Hx     Liver cancer Neg Hx     Liver disease Neg Hx     Rectal cancer Neg Hx     Stomach cancer Neg Hx        Objective    There were no vitals taken for this visit.    Physical Exam        Results for orders placed or performed in visit on 25   POC Urinalysis Dipstick, Multipro    Collection Time: 25 11:49 AM    Specimen: Urine   Result Value Ref Range    Color Yellow Yellow, Straw, Dark Yellow, Aliza    Clarity, UA Clear Clear    Glucose, UA Negative Negative mg/dL    Bilirubin Small (1+) (A) Negative    Ketones, UA Negative Negative    Specific Gravity  1.030 1.005 - 1.030    Blood, UA Negative Negative    pH, Urine 5.5 5.0 - 8.0    Protein, POC 30 mg/dL (A) Negative mg/dL    Urobilinogen, UA 0.2 E.U./dL Normal, 0.2 E.U./dL    Nitrite, UA Negative Negative    Leukocytes Negative Negative     Assessment and Plan    Diagnoses and all orders for this visit:    1.  Prostate cancer (Primary)  -     Ambulatory Referral to Oncology  -     PSA DIAGNOSTIC; Future    2. Urinary retention        Patient with metastatic prostate cancer based on pet imaging.  I personally reviewed these images today.  In addition he has had urinary retention since his biopsy.  I recommend treatment with ADT hopefully this will also shrink up his prostate and allow him to urinate.  I do not think he be a candidate for radiation given his multiple metastatic sites.  I have made a referral to oncology for oral ADT treatment.

## 2025-01-23 ENCOUNTER — OFFICE VISIT (OUTPATIENT)
Dept: UROLOGY | Facility: CLINIC | Age: 80
End: 2025-01-23
Payer: MEDICARE

## 2025-01-23 DIAGNOSIS — R33.9 URINARY RETENTION: Primary | ICD-10-CM

## 2025-01-23 DIAGNOSIS — C61 PROSTATE CANCER: ICD-10-CM

## 2025-01-24 ENCOUNTER — PROCEDURE VISIT (OUTPATIENT)
Dept: UROLOGY | Facility: CLINIC | Age: 80
End: 2025-01-24
Payer: MEDICARE

## 2025-01-24 VITALS — DIASTOLIC BLOOD PRESSURE: 81 MMHG | OXYGEN SATURATION: 95 % | HEART RATE: 86 BPM | SYSTOLIC BLOOD PRESSURE: 139 MMHG

## 2025-01-24 DIAGNOSIS — R33.9 URINARY RETENTION: Primary | ICD-10-CM

## 2025-01-24 NOTE — PROGRESS NOTES
Pt. Of Dr. Pat, presented to clinic unable to void. Pt. Was seen by RACHEL Puga yesterday and had a lilly removed. Pt. States has only been able to dribble small amounts of urine and currently has significant suprapubic discomfort. Bladder scan upon arrival shows 676 ml in pt.'s bladder. Pt. Cleaned with betadine and new 20 fr. Coude catheter inserted without difficulty. 650 ml of dark yellow urine drained from pt.'s bladder. 10cc of sterile water inserted in to the catheter balloon. Catheter was then attached to a leg bag. Pt. Instructed to keep the catheter in place until his follow up visit with Dr. Pat on 2/3/25. Pt. V/u. Catheter care education provided and all questions answered at this time. Pt. Advised to call if he has any issues with his catheter before his appt. Dr. Pat in the office at the time of procedure. SUJEY Troy RN

## 2025-01-29 ENCOUNTER — HOSPITAL ENCOUNTER (OUTPATIENT)
Dept: CT IMAGING | Facility: HOSPITAL | Age: 80
Discharge: HOME | End: 2025-01-29
Payer: MEDICARE

## 2025-01-29 DIAGNOSIS — C61 PROSTATE CANCER: ICD-10-CM

## 2025-01-29 PROCEDURE — A9596 GALLIUM GA 68 GOZETOTIDE 25 MCG KIT: HCPCS | Performed by: UROLOGY

## 2025-01-29 PROCEDURE — 78815 PET IMAGE W/CT SKULL-THIGH: CPT

## 2025-01-29 PROCEDURE — 34310000005 GALLIUM GA 68 GOZETOTIDE 25 MCG KIT: Performed by: UROLOGY

## 2025-01-29 RX ADMIN — KIT FOR THE PREPARATION OF GALLIUM GA 68 GOZETOTIDE INJECTION 1 DOSE: KIT INTRAVENOUS at 11:00

## 2025-01-30 ENCOUNTER — OFFICE VISIT (OUTPATIENT)
Dept: CARDIOLOGY CLINIC | Age: 80
End: 2025-01-30
Payer: MEDICARE

## 2025-01-30 VITALS
SYSTOLIC BLOOD PRESSURE: 116 MMHG | HEIGHT: 71 IN | DIASTOLIC BLOOD PRESSURE: 72 MMHG | HEART RATE: 80 BPM | WEIGHT: 195 LBS | BODY MASS INDEX: 27.3 KG/M2

## 2025-01-30 DIAGNOSIS — I50.22 CHRONIC SYSTOLIC CONGESTIVE HEART FAILURE, NYHA CLASS 2 (HCC): ICD-10-CM

## 2025-01-30 DIAGNOSIS — Z95.1 HX OF CABG: ICD-10-CM

## 2025-01-30 DIAGNOSIS — R00.2 PALPITATIONS: ICD-10-CM

## 2025-01-30 DIAGNOSIS — I25.10 CORONARY ARTERY DISEASE INVOLVING NATIVE CORONARY ARTERY OF NATIVE HEART WITHOUT ANGINA PECTORIS: Primary | ICD-10-CM

## 2025-01-30 DIAGNOSIS — I25.5 ISCHEMIC CARDIOMYOPATHY: ICD-10-CM

## 2025-01-30 PROCEDURE — 3078F DIAST BP <80 MM HG: CPT | Performed by: INTERNAL MEDICINE

## 2025-01-30 PROCEDURE — G8417 CALC BMI ABV UP PARAM F/U: HCPCS | Performed by: INTERNAL MEDICINE

## 2025-01-30 PROCEDURE — 99214 OFFICE O/P EST MOD 30 MIN: CPT | Performed by: INTERNAL MEDICINE

## 2025-01-30 PROCEDURE — 1159F MED LIST DOCD IN RCRD: CPT | Performed by: INTERNAL MEDICINE

## 2025-01-30 PROCEDURE — 1036F TOBACCO NON-USER: CPT | Performed by: INTERNAL MEDICINE

## 2025-01-30 PROCEDURE — 1123F ACP DISCUSS/DSCN MKR DOCD: CPT | Performed by: INTERNAL MEDICINE

## 2025-01-30 PROCEDURE — 3074F SYST BP LT 130 MM HG: CPT | Performed by: INTERNAL MEDICINE

## 2025-01-30 PROCEDURE — G8427 DOCREV CUR MEDS BY ELIG CLIN: HCPCS | Performed by: INTERNAL MEDICINE

## 2025-01-30 RX ORDER — TAMSULOSIN HYDROCHLORIDE 0.4 MG/1
0.4 CAPSULE ORAL DAILY
COMMUNITY

## 2025-01-30 ASSESSMENT — ENCOUNTER SYMPTOMS
SHORTNESS OF BREATH: 0
NAUSEA: 0
RESPIRATORY NEGATIVE: 1
GASTROINTESTINAL NEGATIVE: 1
VOMITING: 0
DIARRHEA: 0
EYES NEGATIVE: 1

## 2025-01-30 NOTE — PROGRESS NOTES
[facility]                            Date:  1/30/2025  Patient: Felipe Tierney  Age:  79 y.o., 1945      Reason for evaluation:         SUBJECTIVE:    Returns today follow-up assessment seen and examined in follow-up for coronary artery disease chronic congestive heart failure ischemic cardiomyopathy previous CABG and palpitations.  Last Lexiscan reviewed 6/29/2023 EF 36% large sized severe intensity fixed perfusion defect inferior wall from base to apex without ischemia.  Denies anginal chest pain or dyspnea and no other complaints or issues reported.  He was recently diagnosed with prostate adenocarcinoma underwent a PET scan recently awaiting report from a cardiac standpoint no objection to surgical intervention if that is felt to be required.    Review of Systems   Constitutional: Negative.  Negative for chills, fever and unexpected weight change.   HENT: Negative.     Eyes: Negative.    Respiratory: Negative.  Negative for shortness of breath.    Cardiovascular: Negative.  Negative for chest pain.   Gastrointestinal: Negative.  Negative for diarrhea, nausea and vomiting.   Endocrine: Negative.    Genitourinary: Negative.    Musculoskeletal: Negative.    Skin: Negative.    Neurological: Negative.    All other systems reviewed and are negative.        OBJECTIVE:    /72   Pulse 80   Ht 1.803 m (5' 11\")   Wt 88.5 kg (195 lb)   BMI 27.20 kg/m²     Labs:   CBC: No results for input(s): \"WBC\", \"HGB\", \"HCT\", \"PLT\" in the last 72 hours.  BMP:No results for input(s): \"NA\", \"K\", \"CO2\", \"BUN\", \"CREATININE\", \"LABGLOM\", \"GLUCOSE\" in the last 72 hours.  BNP: No results for input(s): \"BNP\" in the last 72 hours.  PT/INR: No results for input(s): \"PROTIME\", \"INR\" in the last 72 hours.  APTT:No results for input(s): \"APTT\" in the last 72 hours.  CARDIAC ENZYMES:No results for input(s): \"CKTOTAL\", \"CKMB\", \"CKMBINDEX\", \"TROPONINI\" in the last 72 hours.  FASTING LIPID PANEL:  Lab Results

## 2025-02-03 ENCOUNTER — OFFICE VISIT (OUTPATIENT)
Dept: UROLOGY | Facility: CLINIC | Age: 80
End: 2025-02-03
Payer: MEDICARE

## 2025-02-03 DIAGNOSIS — C61 PROSTATE CANCER: Primary | ICD-10-CM

## 2025-02-03 DIAGNOSIS — R33.9 URINARY RETENTION: ICD-10-CM

## 2025-02-03 PROCEDURE — 99214 OFFICE O/P EST MOD 30 MIN: CPT | Performed by: UROLOGY

## 2025-02-03 PROCEDURE — 1160F RVW MEDS BY RX/DR IN RCRD: CPT | Performed by: UROLOGY

## 2025-02-03 PROCEDURE — 1159F MED LIST DOCD IN RCRD: CPT | Performed by: UROLOGY

## 2025-02-05 NOTE — PROGRESS NOTES
"[facility] HEMATOLOGY & ONCOLOGY  [address]  [place] [id number] [phone]    Patient Name: Francisco Sinclair  Encounter Date: 02/10/2025  YOB: 1945  Patient Number: [id number]      REASON FOR CONSULTATION:  Prostate cancer    HISTORY OF PRESENT ILLNESS:  Francisco \"García\" Yahir is an 79 yoM with CAD, prior CABG, hyperlipidemia, GERD    -  Due to grossly abnormal digital rectal exam and PSA >7:    - 1/2/2025- MRI fusion biopsy-Bright 4+ 5 = adenocarcinoma the prostate 13/13 cores:    Final diagnosis- 1. Prostate, right mid base, needle biopsy:  - Adenocarcinoma with mixed micropapillary ductal and acinar components, Selden grade 4+3 = 7 (Grade Group 3).  -[facility] myxoid stromal changes with hemosiderin-laden macrophages.  - Tumor discontinuously involves approximately 50% of the total linear length of the submitted tissue.  Comment: Immunohistochemical stain for p63 confirms the absence of a basal cell lining.  2. Prostate, right lateral base, needle biopsy:  - Adenocarcinoma, acinar type, Bright grade 4+3 = 7 (Grade Group 3).  -[facility] myxoid stromal changes and focal xanthogranulomata.  - Tumor discontinuously involves approximately 90% of the linear length of the submitted tissue.  Comment: Immunohistochemical stain for p63 confirms the absence of a basal cell lining in the areas of concern.  3. Prostate, right mid medial, needle biopsy:  - Adenocarcinoma, acinar type, Selden grade 4+3 = 7 (Grade Group 3).  - [facility] myxoid stromal changes and hemosiderin-laden macrophages.  - Tumor discontinuously involves approximately 45% of the linear length of the submitted tissue.  4. Prostate, right mid lateral, needle biopsy:  - Adenocarcinoma, acinar type, Selden grade 3+4 = 7 (Grade Group 2).  - Tumor discontinuously involves approximately 30% of the linear length of th  5. Prostate, right mid apex, needle biopsy:  - Adenocarcinoma, acinar " type, Bright grade 4+5 = 9 (Grade Group 5).  - Focal xanthogranuloma.  - Tumor discontinuously involves approximately 70% of the total linear length of the submitted tissue.  6. Prostate, right lateral apex, needle biopsy:  - Adenocarcinoma, acinar type, Bright grade 4+5 = 9 (Grade Group 5).  - [place] stromal myxoid changes.  - Tumor discontinuously involves approximately 40% of the linear length of the submitted tissue.  7. Prostate, left mid base, needle biopsy:  - Adenocarcinoma with mixed acinar and ductal components, Bright grade 3+4 = 7 (Grade Group 2).  - Tumor involves approximately 70% of the linear length of the submitted tissue.  8. Prostate, left lateral base, needle biopsy:  - Adenocarcinoma, acinar type, Bright grade 4+4 = 8 (Grade Group 4).  - Tumor involves approximately 15% of the linear length of the submitted tissue.  9. Prostate, left mid medial, needle biopsy:  - Adenocarcinoma, ductal type, Bright grade 4+4 = 8 (Grade Group 4).  - Tumor involves approximately 10% of the submitted tissue.  10. Prostate, right mid lateral, needle biopsy:  - Adenocarcinoma, acinar type, Sarah grade 4+3 = 7 (Grade Group 3).  - Tumor discontinuously involves approximately 60% of the linear length of the submitted tissue.  11. Prostate, left mid apex, needle biopsy:  - Adenocarcinoma with mixed acinar and ductal components, Sarah grade 3+4 = 7 (Grade Group 2).  - [place] myxoid stromal changes.  - Tumor discontinuously involves approximately 50% of the linear length of the submitted tissue.  12. Prostate, left lateral apex, needle biopsy:  - Adenocarcinoma, acinar type, Bright grade 4+4 = 8 (Grade Group 4).  - [place] myxoid stromal changes.  - Tumor discontinuously involves approximately 80% of the linear length of the submitted tissue.  13. Prostate, lesion 1, targets 1 through 4, needle biopsies:  - Adenocarcinoma with mixed acinar and ductal components, Bright grade 4+3 = 7 (Grade Group 3).  - [place] myxoid  stromal changes and hemosiderin-laden macrophages.  - Tumor discontinuously involves approximately 50% of the total linear length of the submitted tissue.  AJCC stage: pTX pNX    - 1/9/2025- CT abdomen/pelvis- A poorly defined mass between the enlarged prostate and the rectum which shows ill-defined septated low density areas was not noted in the previous study and may represent a mass from the prostate involving the rectum. There is complete loss of fat plane between the prostate and the rectum. Moderate diffuse thickening of the wall of the mid and distal rectum may be due to inflammatory/infectious process or involvement by the prostatic neoplasm. Details given above.Evolving metastatic lesions of the liver which have increased in size since the previous study in October 2024. There is significant dilatation of the urinary bladder which reaches into the abdomen to the level of the umbilicus. This is probably due to outlet obstruction from the enlarged prostate. There is evidence of bilateral hydronephrosis and hydroureter probably due to vesicoureteral  reflux. This was not noted in the previous study.Cholelithiasis. No finding to suggest cholecystitis. Persistent and unchanged retroperitoneal para-aortic lymphadenopathy.This may represent metastatic lymphadenopathy?    -  1/9/2025- Glucose 116, bili 1.4 otherwise normal CMP. CBC normal      - 1/29/2025- PET/CT- Tracer uptake at the LEFT posterior and lateral prostate consistent with known malignancy. Local invasion of seminal vesicle demonstrated on prior MRI pelvis 12/6/2024. Appearance on today's exam is also concerning for involvement of the LEFT anterior lateral rectum. Appearance concerning for ruben involvement at least of the pelvis and appears to involve mediastinal, LEFT axillary, and LEFT subclavicular regions. Evidence for bony metastatic disease.    - 2/3/2025- Consult Dr. Pat, urology-A/P: Patient with metastatic prostate cancer based on pet  imaging. I personally reviewed these images today. In addition he has had urinary retention since his biopsy. I recommend treatment with ADT hopefully this will also shrink up his prostate and allow him to urinate. I do not think he be a candidate for radiation given his multiple metastatic sites. I have made a referral to oncology for oral ADT treatment.     - 2/10/2025- The patient is seen for management considerations    LABS    Lab Results - Last 18 Months   Lab Units 01/09/25  1023 12/20/24  0951   HEMOGLOBIN g/dL 14.1 14.1   HEMATOCRIT % 41.2 41.7   MCV fL 90.2 94.1   WBC 10*3/mm3 9.72 4.16   RDW % 13.3 13.3   MPV fL 10.9 10.4   PLATELETS 10*3/mm3 166 131*   IMM GRAN % % 0.4  --    NEUTROS ABS 10*3/mm3 7.41*  --    LYMPHS ABS 10*3/mm3 1.37  --    MONOS ABS 10*3/mm3 0.86  --    EOS ABS 10*3/mm3 0.02  --    BASOS ABS 10*3/mm3 0.02  --    IMMATURE GRANS (ABS) 10*3/mm3 0.04  --    NRBC /100 WBC 0.0  --        Lab Results - Last 18 Months   Lab Units 01/09/25  1023 12/20/24  0951 12/06/24  0751   GLUCOSE mg/dL 116* 97  --    SODIUM mmol/L 140 143  --    POTASSIUM mmol/L 4.2 4.3  --    CO2 mmol/L 23.0 27.0  --    CHLORIDE mmol/L 103 106  --    ANION GAP mmol/L 14.0 10.0  --    CREATININE mg/dL 1.06 1.14 1.30   BUN mg/dL 18 16  --    BUN / CREAT RATIO  17.0 14.0  --    CALCIUM mg/dL 9.3 8.9  --    ALK PHOS U/L 67  --   --    TOTAL PROTEIN g/dL 7.1  --   --    ALT (SGPT) U/L 20  --   --    AST (SGOT) U/L 26  --   --    BILIRUBIN mg/dL 1.4*  --   --    ALBUMIN g/dL 4.0  --   --    GLOBULIN gm/dL 3.1  --   --          PAST MEDICAL HISTORY:  ALLERGIES:  Allergies   Allergen Reactions    Rosuvastatin Myalgia     Muscle spasms     CURRENT MEDICATIONS:  Outpatient Encounter Medications as of 2/10/2025   Medication Sig Dispense Refill    amLODIPine (NORVASC) 5 MG tablet Take 1 tablet by mouth Daily.      Aspirin 81 MG capsule Take 81 mg by mouth Daily.      atorvastatin (LIPITOR) 80 MG tablet Take 0.5 tablets by mouth Every  Night.      busPIRone (BUSPAR) 10 MG tablet Take 1 tablet by mouth Every Night.      carvedilol (COREG) 12.5 MG tablet Take 0.5 tablets by mouth Daily.      Coenzyme Q10 (Co Q 10) 100 MG capsule Take 200 mg by mouth Daily.      dipyridamole (PERSANTINE) 75 MG tablet Take 1 tablet by mouth 3 (Three) Times a Day.      Flomax 0.4 MG capsule 24 hr capsule 1 capsule Daily.      HYDROcodone-acetaminophen (NORCO) 5-325 MG per tablet Take 1 tablet by mouth Every 4 (Four) Hours As Needed for Moderate Pain. 10 tablet 0    ketorolac (TORADOL) 10 MG tablet Take 1 tablet by mouth Every 6 (Six) Hours As Needed for Moderate Pain (post op pain). 10 tablet 0    lisinopril (PRINIVIL,ZESTRIL) 10 MG tablet Take 1 tablet by mouth Daily.      multivitamin with minerals (Multivitamin Adults) tablet tablet Take 1 tablet by mouth Daily.      Omega-3 Fatty Acids (FISH OIL) 1000 MG capsule capsule Take 1 capsule by mouth Daily With Breakfast.      omeprazole (priLOSEC) 20 MG capsule Take 1 capsule by mouth Daily.      pantoprazole (PROTONIX) 40 MG EC tablet Take 1 tablet by mouth Daily.      ranolazine (RANEXA) 500 MG 12 hr tablet Take 1 tablet by mouth 2 (Two) Times a Day.       No facility-administered encounter medications on file as of 2/10/2025.     ADULT ILLNESSES:  Patient Active Problem List   Diagnosis Code    Hx of colonic polyps Z86.0100    Black stools K92.1    Abnormal MRI, pelvis R93.5    Elevated prostate specific antigen (PSA) R97.20    Prostate cancer C61     SURGERIES:  Past Surgical History:   Procedure Laterality Date    CARDIAC CATHETERIZATION      COLONOSCOPY  08/04/2014    Diverticulosis; Questionable polyp on the ileocecal valve-path showed fragments of benign small intestinal mucosa with submucosa, no adenomatous changes identified; One 3mm adenomatous polyp in the ascending colon; One 6mm adenomatous polyp in the hepatic flexure; Repeat 5 years    COLONOSCOPY  07/14/2010    1cm hyperplastic polyp in the distal  transverse colon; Diverticulosis; Repeat 4-5 years    COLONOSCOPY  06/13/2007    Diverticulosis; Internal hemorrhoids; 7mm hyperplastic rectal polyp; Repeat 3 years    COLONOSCOPY N/A 07/31/2019    Two 4mm tubular adenomatous polyps in the transverse colon; Diverticulosis in the left colon; Repeat 5 years    COLONOSCOPY N/A 6/7/2024    Procedure: COLONOSCOPY WITH ANESTHESIA;  Surgeon: Ana Oliva MD;  Location: [facility] ENDOSCOPY;  Service: Gastroenterology;  Laterality: N/A;  preop; hx of polyps  postop diverticulosis; polyps   PCP Kurt Pichardo    CORONARY ARTERY BYPASS GRAFT  2003    [place], [place]    ENDOSCOPY N/A 6/7/2024    Procedure: ESOPHAGOGASTRODUODENOSCOPY WITH ANESTHESIA;  Surgeon: Ana Oliva MD;  Location: [facility] ENDOSCOPY;  Service: Gastroenterology;  Laterality: N/A;  preop; black stools;   postop  PCP Kurt Pichardo    FOOT SURGERY      PROSTATE BIOPSY N/A 1/2/2025    Procedure: PROSTATE ULTRASOUND BIOPSY MRI FUSION WITH URONAV;  Surgeon: Campos Pat MD;  Location: [facility] OR;  Service: Urology;  Laterality: N/A;     HEALTH MAINTENANCE ITEMS:  Health Maintenance Due   Topic Date Due    TDAP/TD VACCINES (1 - Tdap) Never done    Pneumococcal Vaccine 65+ (1 of 1 - PCV) Never done    ZOSTER VACCINE (2 of 3) 06/25/2014    RSV Vaccine - Adults (1 - 1-dose 75+ series) Never done    ANNUAL WELLNESS VISIT  06/18/2019    BMI FOLLOWUP  07/09/2020    LIPID PANEL  04/15/2022    COVID-19 Vaccine (5 - 2024-25 season) 09/01/2024       <no information>  Last Completed Colonoscopy            COLORECTAL CANCER SCREENING (COLONOSCOPY - Every 3 Years) Next due on 6/7/2027 06/07/2024  Colonoscopy    06/07/2024  Surgical Procedure: COLONOSCOPY    07/31/2019  COLONOSCOPY    07/31/2019  Surgical Procedure: COLONOSCOPY    08/04/2014  SCANNED - COLONOSCOPY    Only the first 5 history entries have been loaded, but more history exists.                  Immunization History   Administered Date(s) Administered     "COVID-19 (MODERNA) BIVALENT 12+YRS 10/11/2022    COVID-19 (MODERNA) Monovalent Original Booster 2021     Last Completed Mammogram       This patient has no relevant Health Maintenance data.              FAMILY HISTORY:  Family History   Problem Relation Age of Onset    Colon cancer Neg Hx     Colon polyps Neg Hx     Esophageal cancer Neg Hx     Liver cancer Neg Hx     Liver disease Neg Hx     Rectal cancer Neg Hx     Stomach cancer Neg Hx      SOCIAL HISTORY:  Social History     Socioeconomic History    Marital status:    Tobacco Use    Smoking status: Former     Current packs/day: 0.00     Types: Cigarettes     Quit date: 2003     Years since quittin.6    Smokeless tobacco: Never   Vaping Use    Vaping status: Never Used   Substance and Sexual Activity    Alcohol use: No    Drug use: Defer    Sexual activity: Defer       REVIEW OF SYSTEMS:  Review of Systems   Constitutional:  Positive for activity change, appetite change and unexpected weight loss (60 pounds in the past 24 months).        Manages his ADLs to include chores, errands and driving.  Is up and about, \"most of the time\".   HENT:  Positive for hearing loss.    Eyes: Negative.    Respiratory: Negative.          Ex cigarette smoker, 2 packs/day.  Quit over 30 years ago   Cardiovascular: Negative.    Gastrointestinal: Negative.    Endocrine: Negative.    Genitourinary:  Positive for decreased urine volume, difficulty urinating (Now wears a Santana catheter with a leg bag), nocturia and urinary incontinence.   Musculoskeletal: Negative.    Allergic/Immunologic: Negative.    Neurological: Negative.    Hematological: Negative.    Psychiatric/Behavioral: Negative.     All other systems reviewed and are negative.      /68   Pulse 81   Temp 97.1 °F (36.2 °C) (Temporal)   Resp 18   Ht 174 cm (68.5\")   Wt 85.9 kg (189 lb 4.8 oz)   SpO2 99%   BMI 28.36 kg/m²  Body surface area is 2.01 meters squared.  Pain Score    02/10/25 1443 "   PainSc: 0-No pain       Physical Exam:  Physical Exam  Vitals and nursing note reviewed.   Constitutional:       Comments: Pleasant, cooperative, heavyset, modestly kept elderly male.  Ambulatory.  ECOG 1.  Accompanied by his daughter-in-law, Angus FRANCISCO:      Head: Normocephalic and atraumatic.   Eyes:      General: No scleral icterus.     Extraocular Movements: Extraocular movements intact.      Pupils: Pupils are equal, round, and reactive to light.   Cardiovascular:      Rate and Rhythm: Normal rate.   Pulmonary:      Effort: Pulmonary effort is normal.   Abdominal:      Palpations: Abdomen is soft.      Tenderness: There is no abdominal tenderness.   Musculoskeletal:         General: Normal range of motion.      Cervical back: Normal range of motion.   Lymphadenopathy:      Cervical: No cervical adenopathy.   Skin:     General: Skin is warm.   Neurological:      General: No focal deficit present.      Mental Status: He is alert.   Psychiatric:         Mood and Affect: Mood normal.         Behavior: Behavior normal.         Thought Content: Thought content normal.         Assessment:  1.  Prostate adenocarcinoma          -Original Tumor stage:  IV- Rodman 4+ 5 = 13/13 cores         -Original tumor burden:  -- 1/9/2025- CT abdomen/pelvis- A poorly defined mass between the enlarged prostate and the rectum which shows ill-defined septated low density areas was not noted in the previous study and may represent a mass from the prostate involving the rectum. There is complete loss of fat plane between the prostate and the rectum. Moderate diffuse thickening of the wall of the mid and distal rectum may be due to inflammatory/infectious process or involvement by the prostatic neoplasm. Details given above.Evolving metastatic lesions of the liver which have increased in size since the previous study in October 2024. There is significant dilatation of the urinary bladder which reaches into the abdomen to the level  of the umbilicus. This is probably due to outlet obstruction from the enlarged prostate. There is evidence of bilateral hydronephrosis and hydroureter probably due to vesicoureteral reflux. This was not noted in the previous study.  Cholelithiasis. No finding to suggest cholecystitis. Persistent and unchanged retroperitoneal para-aortic lymphadenopathy.This may represent metastatic lymphadenopathy?    - 1/29/2025- PET/CT- Tracer uptake at the LEFT posterior and lateral prostate consistent with known malignancy. Local invasion of seminal vesicle demonstrated on prior MRI pelvis 12/6/2024. Appearance on today's exam is also concerning for involvement of the LEFT anterior lateral rectum. Appearance concerning for ruben involvement at least of the pelvis and appears to involve mediastinal, LEFT axillary, and LEFT subclavicular regions. Evidence for bony metastatic disease.      -Tumor status:  Untreated      2.   CAD/CABG  3.   Poor prognosis     Recommendations:  1.   Review available information including the pathology from biopsy, imaging (CT abdomen/pelvis and PET scan above) most recent PSA pending  2.   Draw baseline CBC with differential, iron, iron saturation, ferritin, B12, folate, CMP, PSA, testosterone.    3.    Review NCCN guidelines prostate cancer -systemic therapy for castration naïve disease- M1: ADT (LHRH agonist or orchiectomy) with one of the following: Preferred regimens: Apalutamide, abiraterone, enzalutamide (category 1); OR ADT with docetaxel 75 mg/m2 for 6 cycles and one of the following (preferred) abiraterone (category 1), darolutamide (category 1); OR EBRT to the primary tumor bed for low-volume M1 or ADT.  Follow-up: Physical exam with PSA every 3-6 months.  Imaging for symptoms.  Conventional imaging every 3-6 months for patients with M1 to monitor treatment response..     4.   Reappoint to Dr. Pat Re: ADT (LHRH agonist)     5.  Teaching sheets for abiraterona, degarelix and leuprolide.      "  6.  Apprised of the potential toxicities of leuprolide (to include but not limited to: Hot flashes/diaphoresis,transient tumor flare, headache, depression, emotional lability, generalized pain, dyspepsia, edema, weight changes, dizziness, acne, gynecomastia, insomnia, sexual dysfunction, urogenital disorder, paresthesias, neuromuscular disorders, asthenia, anaphylaxis, bone density loss, suicidality, seizures, hepatotoxicity, diabetes, ureteral obstruction, spinal cord compression, myocardial infarction, stroke, sudden death, Q/T prolongation).  He will agree to a trial of therapy.    7.   Abiraterone.  Potential toxicities discussed: Hypertension, fluid retention, hypokalemia, QT prolongation, torsade de pointes, arrhythmia, cardiac failure, adrenal insufficiency, hepatotoxicity, hepatitis, hepatic failure, fractures, anaphylaxis, rhabdomyolysis, hypertension, arthralgia, hot flashes, edema, diarrhea, cough, fatigue, myalgia, UTI, constipation, hypokalemia, URI, dyspepsia, elevated liver enzymes, insomnia, confusion, dyspnea, arrhythmia, hematuria, cardiac failure, nocturia, fever, fractures, headache, rash, infection, hypertriglyceridemia, hyperglycemia, anemia, hypercholesterolemia.  Questions answered.  He agrees to a trial of therapy.     8.  Rx:      Abiraterone 1000 mg p.o. dailyx 11 refills  Prednisone 5 mg p.o. twice daily with abiraterone # 60 x 11 refills  Leuprolide or degarelix - per Dr. Pat.     9.  Xgeva 120 mg sc this week then every 4 weeks   10.  Discussed tertiary care referral Re:  if patient wishes a second opinion for management /clinical trials.  For now wants to stay local.  \"Maybe later\"  11.  Return to the office in 6 weeks with pre-office CBC with differential, CMP, and PSA.         MEDICAL DECISION MAKING: High Complexity   AMOUNT OF DATA: Extensive  RISK OF COMPLICATIONS: Moderate     I spent ~79 minutes caring for Francisco \"García\" on this date of service. This time includes time spent by " me in the following activities: preparing for the visit, reviewing tests, performing a medically appropriate examination and/or evaluation, counseling and educating the patient/family/caregiver, ordering medications, tests, or procedures and documenting information in the medical record.

## 2025-02-10 ENCOUNTER — CONSULT (OUTPATIENT)
Dept: ONCOLOGY | Facility: CLINIC | Age: 80
End: 2025-02-10
Payer: MEDICARE

## 2025-02-10 ENCOUNTER — SPECIALTY PHARMACY (OUTPATIENT)
Dept: ONCOLOGY | Facility: HOSPITAL | Age: 80
End: 2025-02-10
Payer: MEDICARE

## 2025-02-10 ENCOUNTER — LAB (OUTPATIENT)
Dept: LAB | Facility: HOSPITAL | Age: 80
End: 2025-02-10
Payer: MEDICARE

## 2025-02-10 VITALS
HEIGHT: 69 IN | WEIGHT: 189.3 LBS | BODY MASS INDEX: 28.04 KG/M2 | SYSTOLIC BLOOD PRESSURE: 126 MMHG | TEMPERATURE: 97.1 F | DIASTOLIC BLOOD PRESSURE: 68 MMHG | HEART RATE: 81 BPM | RESPIRATION RATE: 18 BRPM | OXYGEN SATURATION: 99 %

## 2025-02-10 DIAGNOSIS — C61 PROSTATE CANCER: ICD-10-CM

## 2025-02-10 DIAGNOSIS — C61 PROSTATE CANCER: Primary | ICD-10-CM

## 2025-02-10 LAB
ALBUMIN SERPL-MCNC: 3.6 G/DL (ref 3.5–5.2)
ALBUMIN/GLOB SERPL: 1.2 G/DL
ALP SERPL-CCNC: 75 U/L (ref 39–117)
ALT SERPL W P-5'-P-CCNC: 10 U/L (ref 1–41)
ANION GAP SERPL CALCULATED.3IONS-SCNC: 8 MMOL/L (ref 5–15)
AST SERPL-CCNC: 15 U/L (ref 1–40)
BASOPHILS # BLD AUTO: 0.01 10*3/MM3 (ref 0–0.2)
BASOPHILS NFR BLD AUTO: 0.3 % (ref 0–1.5)
BILIRUB SERPL-MCNC: 0.4 MG/DL (ref 0–1.2)
BUN SERPL-MCNC: 17 MG/DL (ref 8–23)
BUN/CREAT SERPL: 14.8 (ref 7–25)
CALCIUM SPEC-SCNC: 8.4 MG/DL (ref 8.6–10.5)
CHLORIDE SERPL-SCNC: 106 MMOL/L (ref 98–107)
CO2 SERPL-SCNC: 28 MMOL/L (ref 22–29)
CREAT SERPL-MCNC: 1.15 MG/DL (ref 0.76–1.27)
DEPRECATED RDW RBC AUTO: 50 FL (ref 37–54)
EGFRCR SERPLBLD CKD-EPI 2021: 63.9 ML/MIN/1.73
EOSINOPHIL # BLD AUTO: 0.16 10*3/MM3 (ref 0–0.4)
EOSINOPHIL NFR BLD AUTO: 4.1 % (ref 0.3–6.2)
ERYTHROCYTE [DISTWIDTH] IN BLOOD BY AUTOMATED COUNT: 14.6 % (ref 12.3–15.4)
FERRITIN SERPL-MCNC: 231.1 NG/ML (ref 30–400)
GLOBULIN UR ELPH-MCNC: 2.9 GM/DL
GLUCOSE SERPL-MCNC: 82 MG/DL (ref 65–99)
HCT VFR BLD AUTO: 37.4 % (ref 37.5–51)
HGB BLD-MCNC: 12.1 G/DL (ref 13–17.7)
IMM GRANULOCYTES # BLD AUTO: 0.01 10*3/MM3 (ref 0–0.05)
IMM GRANULOCYTES NFR BLD AUTO: 0.3 % (ref 0–0.5)
IRON 24H UR-MRATE: 36 MCG/DL (ref 59–158)
IRON SATN MFR SERPL: 14 % (ref 20–50)
LYMPHOCYTES # BLD AUTO: 1.19 10*3/MM3 (ref 0.7–3.1)
LYMPHOCYTES NFR BLD AUTO: 30.3 % (ref 19.6–45.3)
MCH RBC QN AUTO: 30.1 PG (ref 26.6–33)
MCHC RBC AUTO-ENTMCNC: 32.4 G/DL (ref 31.5–35.7)
MCV RBC AUTO: 93 FL (ref 79–97)
MONOCYTES # BLD AUTO: 0.34 10*3/MM3 (ref 0.1–0.9)
MONOCYTES NFR BLD AUTO: 8.7 % (ref 5–12)
NEUTROPHILS NFR BLD AUTO: 2.22 10*3/MM3 (ref 1.7–7)
NEUTROPHILS NFR BLD AUTO: 56.3 % (ref 42.7–76)
NRBC BLD AUTO-RTO: 0 /100 WBC (ref 0–0.2)
PLATELET # BLD AUTO: 149 10*3/MM3 (ref 140–450)
PMV BLD AUTO: 9.8 FL (ref 6–12)
POTASSIUM SERPL-SCNC: 3.9 MMOL/L (ref 3.5–5.2)
PROT SERPL-MCNC: 6.5 G/DL (ref 6–8.5)
RBC # BLD AUTO: 4.02 10*6/MM3 (ref 4.14–5.8)
SODIUM SERPL-SCNC: 142 MMOL/L (ref 136–145)
TIBC SERPL-MCNC: 261 MCG/DL (ref 298–536)
TRANSFERRIN SERPL-MCNC: 175 MG/DL (ref 200–360)
WBC NRBC COR # BLD AUTO: 3.93 10*3/MM3 (ref 3.4–10.8)

## 2025-02-10 PROCEDURE — 84466 ASSAY OF TRANSFERRIN: CPT

## 2025-02-10 PROCEDURE — 82728 ASSAY OF FERRITIN: CPT

## 2025-02-10 PROCEDURE — 85025 COMPLETE CBC W/AUTO DIFF WBC: CPT

## 2025-02-10 PROCEDURE — 84153 ASSAY OF PSA TOTAL: CPT

## 2025-02-10 PROCEDURE — 36415 COLL VENOUS BLD VENIPUNCTURE: CPT

## 2025-02-10 PROCEDURE — 82607 VITAMIN B-12: CPT

## 2025-02-10 PROCEDURE — 80053 COMPREHEN METABOLIC PANEL: CPT

## 2025-02-10 PROCEDURE — 83540 ASSAY OF IRON: CPT

## 2025-02-10 PROCEDURE — 84403 ASSAY OF TOTAL TESTOSTERONE: CPT

## 2025-02-10 PROCEDURE — 82746 ASSAY OF FOLIC ACID SERUM: CPT

## 2025-02-11 ENCOUNTER — SPECIALTY PHARMACY (OUTPATIENT)
Dept: ONCOLOGY | Facility: HOSPITAL | Age: 80
End: 2025-02-11
Payer: MEDICARE

## 2025-02-11 DIAGNOSIS — C61 PROSTATE CANCER: Primary | ICD-10-CM

## 2025-02-11 LAB
FOLATE SERPL-MCNC: 16.2 NG/ML (ref 4.78–24.2)
PSA SERPL-MCNC: 12 NG/ML (ref 0–4)
TESTOST SERPL-MCNC: 426 NG/DL (ref 193–740)
VIT B12 BLD-MCNC: 460 PG/ML (ref 211–946)

## 2025-02-11 RX ORDER — PREDNISONE 5 MG/1
5 TABLET ORAL 2 TIMES DAILY
Qty: 60 TABLET | Refills: 5 | Status: SHIPPED | OUTPATIENT
Start: 2025-02-11 | End: 2025-02-11

## 2025-02-11 RX ORDER — ABIRATERONE ACETATE 250 MG/1
1000 TABLET ORAL DAILY
Qty: 120 TABLET | Refills: 5 | Status: SHIPPED | OUTPATIENT
Start: 2025-02-11

## 2025-02-11 RX ORDER — PREDNISONE 5 MG/1
5 TABLET ORAL DAILY
Qty: 30 TABLET | Refills: 5 | Status: SHIPPED | OUTPATIENT
Start: 2025-02-11 | End: 2025-02-11

## 2025-02-11 RX ORDER — PREDNISONE 5 MG/1
5 TABLET ORAL 2 TIMES DAILY
Qty: 60 TABLET | Refills: 5 | Status: SHIPPED | OUTPATIENT
Start: 2025-02-11

## 2025-02-11 NOTE — PROGRESS NOTES
Specialty Pharmacy Patient Management Program  Oncology Initial Assessment       Francisco Sinclair is a 79 y.o. male with Prostate Cancer seen seen by an Oncology provider and enrolled in the Specialty Oncology Patient Management program offered by [facility].  An initial outreach was conducted, including assessment of therapy appropriateness and specialty medication education for Zytiga + prednisone. The patient was introduced to services offered by [facility], including: regular assessments, refill coordination, curbside pick-up or mail order delivery options, prior authorization maintenance, and financial assistance programs as applicable. The patient was also provided with contact information for the pharmacy team.     Diagnosis (Documented by Provider):  Prostate adenocarcinoma          -Original Tumor stage:  IV- Gould 4+ 5 = 13/13 cores         -Original tumor burden:  -- 1/9/2025- CT abdomen/pelvis- A poorly defined mass between the enlarged prostate and the rectum which shows ill-defined septated low density areas was not noted in the previous study and may represent a mass from the prostate involving the rectum. There is complete loss of fat plane between the prostate and the rectum. Moderate diffuse thickening of the wall of the mid and distal rectum may be due to inflammatory/infectious process or involvement by the prostatic neoplasm. Details given above.Evolving metastatic lesions of the liver which have increased in size since the previous study in October 2024. There is significant dilatation of the urinary bladder which reaches into the abdomen to the level of the umbilicus. This is probably due to outlet obstruction from the enlarged prostate. There is evidence of bilateral hydronephrosis and hydroureter probably due to vesicoureteral reflux. This was not noted in the previous study.  Cholelithiasis. No finding to suggest cholecystitis. Persistent and  unchanged retroperitoneal para-aortic lymphadenopathy.This may represent metastatic lymphadenopathy?    - 1/29/2025- PET/CT- Tracer uptake at the LEFT posterior and lateral prostate consistent with known malignancy. Local invasion of seminal vesicle demonstrated on prior MRI pelvis 12/6/2024. Appearance on today's exam is also concerning for involvement of the LEFT anterior lateral rectum. Appearance concerning for ruben involvement at least of the pelvis and appears to involve mediastinal, LEFT axillary, and LEFT subclavicular regions. Evidence for bony metastatic disease.    Oncology Provider: Josh Santa MD ([facility])  Oral Chemotherapy Regimen: abiraterone [Zytiga] 1000 mg PO daily + prednisone 5 mg PO twice daily  Consult Message Received:      Initiation Progress Updates:          Script Review and Evaluation + Insurance Coverage and Financial Support:  Oral Treatment Plan Signed?: Yes   Oral Treatment Plan Released?: Yes, released on 2/11/2025.   Specialty Pharmacy Selected:  [facility], [facility] ([place]) [address] - [phone] [facility] [phone] FX   Prior Authorization Status: Approved   Financial Assistance Status: Approved, patient is a [facility] patient. However, script was sent to [facility] to gather in-house funding since patient's copay amount was showing over $1000/month. If patient calls back (as he was educated to) and states he will not be able to afford medication with [facility], we will send prescription to [facility] instead.   Predicted Shipping Date: Pending [facility] filling process/in-house funding/shipment   Predicted Start Date: As soon as oral specialty medication is available.     Oncology/Hematology History Overview Note    Oncology/Hematology History   Prostate cancer   2/10/2025 Initial Diagnosis    Prostate cancer     2/10/2025 -  Chemotherapy    OP PROSTATE Enzalutamide     2/17/2025 -  Chemotherapy     OP SUPPORTIVE Denosumab (Xgeva) Q28D         Problem List   Problem list reviewed by Bethany Louise, PharmJONAS on 2025 at  4:56 PM  Medicines reviewed by Bethany Louise PharmD on 2025 at  4:47 PM  Allergies reviewed by Bethany Louise PharmD on 2025 at  4:43 PM  Patient Active Problem List   Diagnosis Code    Hx of colonic polyps Z86.0100    Black stools K92.1    Abnormal MRI, pelvis R93.5    Elevated prostate specific antigen (PSA) R97.20    Prostate cancer C61       Specialty Pharmacy Goal:   Goals Addressed Today        Specialty Pharmacy General Goal      Achieve progression free disease as evidenced by provider markers (ie. undetectable PSA WNL)              Relevant Past Medical History, Comorbidities, and Vaccines:  Relevant medical history and concomitant health conditions were discussed with the patient. The patient's chart has been reviewed for relevant past medical history and comorbid health conditions and updated as necessary.  Vaccines are coordinated by the patient's oncologist and primary care provider.  Past Medical History:   Diagnosis Date    CAD (coronary artery disease)     Diverticulosis     Elevated cholesterol     GERD (gastroesophageal reflux disease)     History of adenomatous polyp of colon     History of colon polyps     Hyperlipidemia     Hypertension      Social History     Socioeconomic History    Marital status:    Tobacco Use    Smoking status: Former     Current packs/day: 0.00     Types: Cigarettes     Quit date: 2003     Years since quittin.6    Smokeless tobacco: Never   Vaping Use    Vaping status: Never Used   Substance and Sexual Activity    Alcohol use: No    Drug use: Defer    Sexual activity: Defer       Relevant Laboratory Values:  The labs listed below have been reviewed. No dose adjustments are needed for the oral specialty medication(s) based on the labs.    Lab Results   Component Value Date    GLUCOSE 82 02/10/2025    CALCIUM  8.4 (L) 02/10/2025     02/10/2025    K 3.9 02/10/2025    CO2 28.0 02/10/2025     02/10/2025    BUN 17 02/10/2025    CREATININE 1.15 02/10/2025    EGFRIFAFRI >59 03/08/2021    EGFRIFNONA 49 (A) 03/08/2021    BCR 14.8 02/10/2025    ANIONGAP 8.0 02/10/2025     Lab Results   Component Value Date    WBC 3.93 02/10/2025    RBC 4.02 (L) 02/10/2025    HGB 12.1 (L) 02/10/2025    HCT 37.4 (L) 02/10/2025    MCV 93.0 02/10/2025    MCH 30.1 02/10/2025    MCHC 32.4 02/10/2025    RDW 14.6 02/10/2025    RDWSD 50.0 02/10/2025    MPV 9.8 02/10/2025     02/10/2025    NEUTRORELPCT 56.3 02/10/2025    LYMPHORELPCT 30.3 02/10/2025    MONORELPCT 8.7 02/10/2025    EOSRELPCT 4.1 02/10/2025    BASORELPCT 0.3 02/10/2025    AUTOIGPER 0.3 02/10/2025    NEUTROABS 2.22 02/10/2025    LYMPHSABS 1.19 02/10/2025    MONOSABS 0.34 02/10/2025    EOSABS 0.16 02/10/2025    BASOSABS 0.01 02/10/2025    AUTOIGNUM 0.01 02/10/2025    NRBC 0.0 02/10/2025       Allergy Review:  Known allergies and reactions were discussed with the patient. The patient's chart has been reviewed for allergy information and updated as necessary.   Allergies   Allergen Reactions    Rosuvastatin Myalgia     Muscle spasms        Current Medication List:  This medication list has been reviewed with the patient and evaluated for any interactions or necessary modifications/recommendations, and updated to include all prescription medications, OTC medications, and supplements the patient is currently taking.  This list reflects what is contained in the patient's profile, which has also been marked as reviewed to communicate to other providers it is the most up to date version of the patient's current medication therapy.   Prior to Admission medications    Medication Sig Start Date End Date Taking? Authorizing Provider   abiraterone acetate (ZYTIGA) 250 MG tablet Take 4 tablets by mouth Daily. Take on empty stomach; either 1 hour before or 2 hours after a meal. 2/11/25    Josh Santa MD   amLODIPine (NORVASC) 2.5 MG tablet Take 1 tablet by mouth Daily.    Alfonso Godfrey MD   Aspirin 81 MG capsule Take 81 mg by mouth Daily.    Alfonso Godfrey MD   atorvastatin (LIPITOR) 80 MG tablet Take 0.5 tablets by mouth Every Night.    Alfonso Godfrey MD   carvedilol (COREG) 12.5 MG tablet Take 0.5 tablets by mouth 2 (Two) Times a Day With Meals.    Alfonso Godfrey MD   Coenzyme Q10 (Co Q 10) 100 MG capsule Take 100 mg by mouth Daily.    Alfonso Godfrey MD   dipyridamole (PERSANTINE) 75 MG tablet Take 1 tablet by mouth 3 (Three) Times a Day.    Alfonso Godfrey MD   Flomax 0.4 MG capsule 24 hr capsule Take 1 capsule by mouth Daily. 10/15/24   Alfonso Godfrey MD   HYDROcodone-acetaminophen (NORCO) 5-325 MG per tablet Take 1 tablet by mouth Every 4 (Four) Hours As Needed for Moderate Pain. 1/9/25   Abdulaziz Simms Jr., MD   ketorolac (TORADOL) 10 MG tablet Take 1 tablet by mouth Every 6 (Six) Hours As Needed for Moderate Pain (post op pain). 1/15/25   Sabrina Vyas APRN   lisinopril (PRINIVIL,ZESTRIL) 20 MG tablet Take 0.5 tablets by mouth Daily.    Alfonso Godfrey MD   multivitamin with minerals (Multivitamin Adults) tablet tablet Take 1 tablet by mouth Daily. Centrum Silver Men 50+ Multivitamin    Alfonso Godfrey MD   Omega-3 Fatty Acids (FISH OIL) 1000 MG capsule capsule Take 1 capsule by mouth Daily With Breakfast.    Alfonso Godfrey MD   omeprazole (priLOSEC) 20 MG capsule Take 1 capsule by mouth Daily.    Alfonso Godfrey MD   predniSONE (DELTASONE) 5 MG tablet Take 1 tablet by mouth 2 (Two) Times a Day. Start taking medication, the same day as abiraterone [Zytiga]. 2/11/25   Josh Santa MD   ranolazine (RANEXA) 1000 MG 12 hr tablet Take 1 tablet by mouth 2 (Two) Times a Day.    Alfonso Godfrey MD   busPIRone (BUSPAR) 10 MG tablet Take 1 tablet by mouth Every Night. 5/15/24 2/11/25  Alfonso Godfrey  MD   cefdinir (OMNICEF) 300 MG capsule Take 1 capsule by mouth 2 (Two) Times a Day.  Patient not taking: Reported on 1/23/2025 1/9/25 2/3/25  Abdulaziz Simms Jr., MD   pantoprazole (PROTONIX) 40 MG EC tablet Take 1 tablet by mouth Daily.  2/11/25  Alfonso Godfrey MD   predniSONE (DELTASONE) 5 MG tablet Take 1 tablet by mouth Daily. Start taking medication, the same day as abiraterone [Zytiga]. 2/11/25 2/11/25  Josh Santa MD   predniSONE (DELTASONE) 5 MG tablet Take 1 tablet by mouth 2 (Two) Times a Day. Start taking medication, the same day as abiraterone [Zytiga]. 2/11/25 2/11/25  Josh Santa MD       Medication(s) Review:   Medicines reviewed by Bethany Louise, PharmD on 2/11/2025 at  4:47 PM  Prior to Admission medications    Medication Sig Start Date End Date Taking? Authorizing Provider   abiraterone acetate (ZYTIGA) 250 MG tablet Take 4 tablets by mouth Daily. Take on empty stomach; either 1 hour before or 2 hours after a meal. 2/11/25   Josh Santa MD   amLODIPine (NORVASC) 2.5 MG tablet Take 1 tablet by mouth Daily.    Alfonso Godfrey MD   Aspirin 81 MG capsule Take 81 mg by mouth Daily.    Alfonso Godfrey MD   atorvastatin (LIPITOR) 80 MG tablet Take 0.5 tablets by mouth Every Night.    Alfonso Godfrey MD   carvedilol (COREG) 12.5 MG tablet Take 0.5 tablets by mouth 2 (Two) Times a Day With Meals.    Alfonso Godfrey MD   Coenzyme Q10 (Co Q 10) 100 MG capsule Take 100 mg by mouth Daily.    Alfonso Godfrey MD   dipyridamole (PERSANTINE) 75 MG tablet Take 1 tablet by mouth 3 (Three) Times a Day.    Alfonso Godfrey MD   Flomax 0.4 MG capsule 24 hr capsule Take 1 capsule by mouth Daily. 10/15/24   Alfonso Godfrey MD   HYDROcodone-acetaminophen (NORCO) 5-325 MG per tablet Take 1 tablet by mouth Every 4 (Four) Hours As Needed for Moderate Pain. 1/9/25   Abdulaziz Simms Jr., MD   ketorolac (TORADOL) 10 MG tablet Take 1 tablet by mouth  Every 6 (Six) Hours As Needed for Moderate Pain (post op pain). 1/15/25   Sabrina Vyas APRN   lisinopril (PRINIVIL,ZESTRIL) 20 MG tablet Take 0.5 tablets by mouth Daily.    Alfonso Godfrey MD   multivitamin with minerals (Multivitamin Adults) tablet tablet Take 1 tablet by mouth Daily. Centrum Silver Men 50+ Multivitamin    Alfonso Godfrey MD   Omega-3 Fatty Acids (FISH OIL) 1000 MG capsule capsule Take 1 capsule by mouth Daily With Breakfast.    Alfonso Godfrey MD   omeprazole (priLOSEC) 20 MG capsule Take 1 capsule by mouth Daily.    Alfonso Godfrey MD   predniSONE (DELTASONE) 5 MG tablet Take 1 tablet by mouth 2 (Two) Times a Day. Start taking medication, the same day as abiraterone [Zytiga]. 2/11/25   Josh Santa MD   ranolazine (RANEXA) 1000 MG 12 hr tablet Take 1 tablet by mouth 2 (Two) Times a Day.    Alfonso Godfrey MD   busPIRone (BUSPAR) 10 MG tablet Take 1 tablet by mouth Every Night. 5/15/24 2/11/25  Alfonso Godfrey MD   cefdinir (OMNICEF) 300 MG capsule Take 1 capsule by mouth 2 (Two) Times a Day.  Patient not taking: Reported on 1/23/2025 1/9/25 2/3/25  Abdulaziz Simms Jr., MD   pantoprazole (PROTONIX) 40 MG EC tablet Take 1 tablet by mouth Daily.  2/11/25  Alfonso Godfrey MD   predniSONE (DELTASONE) 5 MG tablet Take 1 tablet by mouth Daily. Start taking medication, the same day as abiraterone [Zytiga]. 2/11/25 2/11/25  Josh Santa MD   predniSONE (DELTASONE) 5 MG tablet Take 1 tablet by mouth 2 (Two) Times a Day. Start taking medication, the same day as abiraterone [Zytiga]. 2/11/25 2/11/25  Josh Santa MD       Medication Regimen Assessment:  Administration: Patient is taking every day at the same time , on an empty stomach , and as prescribed .   Patient can self administer medications: yes  Medication Follow-Up Plan: Refill coordination    Drug-Drug Interaction(s) Assessment:  Reviewed concomitant medications, allergies, labs,  comorbidities/medical history, quality of life, and immunization history.   Completed medication reconciliation today to assess for drug interactions. Patient denies starting or stopping any medications.    Drug-drug interactions noted and discussed during education: no significant drug interactions noted. . Reminded the patient to let us know before making any changes or starting any new prescription or OTC medications so we can first assess drug interactions.  Drug-food interactions noted and discussed during education. Patient was instructed to avoid eating grapefruit and drinking grapefruit juice and eating [place] oranges (commonly found in orange marmalade)  Vaccines are coordinated by the patient's oncologist and primary care provider.    Prophylaxis Medications Assessment:  Bone Health (such as calcium/vitamin D, bisphosphonate, RANKL inhibitor) - Currently Taking The patient will start taking Denosumab every 4 weeks  VTE prophylaxis - Currently Taking The patient is currently on Aspirin 81 mg PO daily  Prophylactic antimicrobials - Not Indicated   Tumor lysis syndrome prophylaxis - Risk for TLS Low.     INITIAL EDUCATION PROVIDED FOR SPECIALTY MEDICATION:  The patient has been provided with the following education and any applicable administration techniques (i.e. self-injection) have been demonstrated for the therapies indicated. All questions and concerns have been addressed prior to the patient receiving the medication, and the patient has verbalized comprehension of the education and any materials provided. Additional patient education shall be provided and documented upon request by the patient, provider, or payer.    Provided Patient With: Education sheets about the medication, 24-hour clinic phone number and my contact information and instructions to call should additional questions arise.     Medication Education Sheets Provided: (select all that apply)  Oral Specialty Medication: Zytiga  (abiraterone acetate)  Steroid: Prednisone    TOPICS COMMENTS   Storage and Handling of Oral Specialty Medication Store in the original container, in a dry location out of direct sunlight, and out of reach of children or pets. Store at room temperature.  Discussed safe handling and what to do with any unused medication.   Administration of Oral Specialty Medication Take on an empty stomach, 1 hour(s) before and 2 hour(s) after eating. Take with a full glass of water. Do not crush or chew tablets.   Adherence to Oral Specialty Regimen and Handling Missed Doses Patient is likely to have good treatment adherence; reinforced the importance of adherence. Reviewed how to address missed doses and encouraged the patient to let us know of any missed doses.   Anemia: role of RBC, cause, s/s, ways to manage, role of transfusion Reviewed the role of RBC and the use of transfusions if hemoglobin decreases too much.  Patient to notify us if he experiences shortness of breath, dizziness, or palpitations.  Also let patient know that he could feel more tired than usual and to try to stay active, but rest if he needs to.    Thrombocytopenia: role of platelet, cause, s/s, ways to prevent bleeding, things to avoid, when to seek help Reviewed the role of platelets in blood clotting and when to call clinic (bloody nose that bleeds for 5 minutes despite pressure, a cut that won't stop bleeding despite pressure, gums that bleed excessively with brushing or flossing). Recommended using an electric razor, soft bristle toothbrush, and blowing your nose gently.    Neutropenia: role of WBC, cause, infection precautions, s/s of infection, when to call MD Reviewed the role of WBC, good infection prevention practices, and when to call the clinic (temperature 100.4F, sore throat, burning urination, etc).  COVID Vaccines:  recommended  Flu Vaccine:  recommended   Nutrition and Appetite Changes:  importance of maintaining healthy diet & weight, ways  to manage to improve intake, dietary consult, exercise regimen, electrolyte and/or blood glucose abnormalities Decreased Appetite: Discussed the risk of decreased appetite. Recommended eating smaller, more frequent meals. Instructed the patient to contact the clinic if losing weight or having difficulty eating enough to maintain energy level.   Diarrhea: causes, s/s of dehydration, ways to manage, dietary changes, when to call MD Discussed the risk of diarrhea. Instructed patient on use OTC loperamide with diarrhea onset, but emphasized the importance of calling the clinic if 4-6 episodes in 24 hours not relieved by OTC loperamide.   Constipation: causes, ways to manage, dietary changes, when to call MD Provided supplementary handout with instructions for use of docusate and other OTC therapies to manage constipation.  Patient was instructed to call us if medications aren't working.   Nausea/Vomiting: cause, use of antiemetics, dietary changes, when to call MD Emetic risk: Low  PRN home meds: Ondansetron 8mg PO every 8 hours PRN nausea / vomiting  Pharmacy home meds sent to: [facility] - [place], [place] - [address] [facility] - [phone] [facility] [phone] FX    Instructed the patient to take the scheduled anti-nausea medications even if he does not feel nauseous.  I explained this is to prevent delayed nausea.    Instructed the patient to take a dose of the PRN medication at the first onset of nausea and if it's not working to call us for additional medications.  Also provided non-drug measures to mitigate nausea.   Mouth Sores: causes, oral care, ways to manage Mouth sores can be prevented by making a mouth wash mixture of salt, baking soda, and water. The patient was instructed to swish and spit four times daily after meals and before bedtime.  Use of a soft bristle toothbrush was recommended.  The patient was instructed to avoid alcohol-containing OTC mouthwashes.    Alopecia: cause, ways to manage,  resources Discussed the possibility of hair loss with the patient. Informed patient that he could request a prescription for a wig if desired and most of the cost is usually covered by insurance. Recommended covering the head with a hat and/or protecting the skin on the head with SPF 30 or higher.    Nervous System Changes: causes, s/s, neuropathies, cognitive changes, ways to manage Headache: Can occur with this treatment. Patient encouraged to call clinic if they develop new or severe headaches. and Hot Flashes: Discussed the possibility of hot flashes as well as mitigation strategies.   Pain: causes, ways to manage Headache: Reviewed the potential for headache, and encouraged the patient to call the clinic if the headache follows a head injury, is severe or starts suddenly, if it last more than 3 days, or if it is associated with vomiting, visual disturbance, confusion, etc.   Skin/Nail Changes: cause, s/s, ways to manage Immunotherapy - Discussed potential for a rash or itchy skin from immunotherapy, offered nonpharmacologic prevention strategies, and instructed patient to call if a rash develops that worsens or gets larger   Organ Toxicities: cause, s/s, need for diagnostic tests, labs, when to notify MD Discussed potential effects on organ systems, monitoring, diagnostic tests, labs, and when to notify the MD. Discussed the signs/symptoms of the following: cardiotoxicity, delayed wound healing, hypertension - recommended close monitoring of blood pressure, provided BP log, and explained how to track values, lung changes, and skin changes.   Survivorship: distress, distress assessment, secondary malignancies, early/late effects, follow-up, social issues, social support Discussed the rare, but possible risk of secondary malignancies months to years after treatment   Miscellaneous Financial Issues: none at this time  Lab Draws:  Return to the office in 6 weeks with pre-office CBC with differential, CMP, and PSA    Infertility and Sexuality:  causes, fertility preservation options, sexuality changes, ways to manage, importance of birth control The patient is not of childbearing potential.   Home Care: how to manage bodily fluids Counseled on management of soiled linens and proper flush technique.  Discussed how to manage all the side effects at home and advised when to contact the MD office     Adherence and Self-Administration Assessment:  Adherence related to the patient's specialty therapy was discussed with the patient. The adherence segment of this outreach has been reviewed and updated.  Barriers to Patient Adherence and/or Self-Administration: none at this time  Methods for Supporting Patient Adherence and/or Self-Administration: Dosing calendar  Expected duration of therapy: Until disease progression or intolerable toxicity    Goals of Therapy:  Goals related to the patient's specialty therapy were discussed with the patient. The patient goals segment of this outreach has been reviewed and updated.  Patient Goals of Therapy:   Consistently take medications as prescribed  Patient will adhere to medication regimen  Patient will report any medication side effects to healthcare provider  Clinical Goals:    Goals Addressed Today        Specialty Pharmacy General Goal      Achieve progression free disease as evidenced by provider markers (ie. undetectable PSA WNL)            Support patient understanding of medication regimen  Ensure patient knows the pharmacy contact information  Schedule regular follow-up to monitor the treatment serious adverse events  Schedule regular follow-up to confirm medication adherence  Schedule regular follow-up to monitor disease progression or stability    Quality of Life Assessment:  The patient's current (pre-therapy) quality of life was discussed with the patient. The QOL segment of this outreach has been reviewed and updated.   Quality of Life Score: 7/10    Reassessment Plan and  Follow-Up:  Pharmacist to perform regular reassessments no more than (6) months from the previous assessment.  Welcome information and patient satisfaction survey to be sent by retail team with patient's initial fill.  Care Coordinator to set up future refill outreaches, coordinate prescription delivery, and escalate clinical questions to pharmacist.     Medication Therapy Changes: none at this time  Additional Plans, Therapy Recommendations or Therapy Problems to Be Addressed: none at this time     Attestation:  I attest the patient was actively involved in and has agreed to the above plan of care. If the prescribed therapy is at any point deemed not appropriate based on the current or future assessments, a consultation will be initiated with the patient's specialty care provider to determine the best course of action. The revised plan of therapy will be documented along with any required assessments and/or additional patient education provided.     Finally, all questions and concerns today were addressed to the best of my knowledge within the initial clinical assessment office visit. Patient verbalized they had the [facility] contact information for any future concerns or questions.         Electronically signed 02/11/2025 16:57 CST by:  Bethany Louise PharmD  [facility] Specialty Pharmacist  [facility]  Phone: [phone]

## 2025-02-17 ENCOUNTER — INFUSION (OUTPATIENT)
Dept: ONCOLOGY | Facility: HOSPITAL | Age: 80
End: 2025-02-17
Payer: MEDICARE

## 2025-02-17 ENCOUNTER — LAB (OUTPATIENT)
Dept: LAB | Facility: HOSPITAL | Age: 80
End: 2025-02-17
Payer: MEDICARE

## 2025-02-17 ENCOUNTER — TELEPHONE (OUTPATIENT)
Dept: ONCOLOGY | Facility: CLINIC | Age: 80
End: 2025-02-17
Payer: MEDICARE

## 2025-02-17 VITALS
RESPIRATION RATE: 18 BRPM | BODY MASS INDEX: 28.08 KG/M2 | WEIGHT: 189.6 LBS | DIASTOLIC BLOOD PRESSURE: 71 MMHG | HEART RATE: 79 BPM | TEMPERATURE: 96.9 F | HEIGHT: 69 IN | OXYGEN SATURATION: 100 % | SYSTOLIC BLOOD PRESSURE: 157 MMHG

## 2025-02-17 DIAGNOSIS — C61 PROSTATE CANCER: Primary | ICD-10-CM

## 2025-02-17 DIAGNOSIS — C61 PROSTATE CANCER: ICD-10-CM

## 2025-02-17 DIAGNOSIS — R97.20 ELEVATED PROSTATE SPECIFIC ANTIGEN (PSA): Primary | ICD-10-CM

## 2025-02-17 LAB
ALBUMIN SERPL-MCNC: 3.9 G/DL (ref 3.5–5.2)
ALBUMIN/GLOB SERPL: 1.5 G/DL
ALP SERPL-CCNC: 67 U/L (ref 39–117)
ALT SERPL W P-5'-P-CCNC: 10 U/L (ref 1–41)
ANION GAP SERPL CALCULATED.3IONS-SCNC: 8 MMOL/L (ref 5–15)
AST SERPL-CCNC: 16 U/L (ref 1–40)
BILIRUB SERPL-MCNC: 0.4 MG/DL (ref 0–1.2)
BUN SERPL-MCNC: 19 MG/DL (ref 8–23)
BUN/CREAT SERPL: 16.4 (ref 7–25)
CALCIUM SPEC-SCNC: 8.8 MG/DL (ref 8.6–10.5)
CHLORIDE SERPL-SCNC: 105 MMOL/L (ref 98–107)
CO2 SERPL-SCNC: 29 MMOL/L (ref 22–29)
CREAT SERPL-MCNC: 1.16 MG/DL (ref 0.76–1.27)
EGFRCR SERPLBLD CKD-EPI 2021: 63.3 ML/MIN/1.73
GLOBULIN UR ELPH-MCNC: 2.6 GM/DL
GLUCOSE SERPL-MCNC: 89 MG/DL (ref 65–99)
MAGNESIUM SERPL-MCNC: 1.5 MG/DL (ref 1.6–2.4)
PHOSPHATE SERPL-MCNC: 3.5 MG/DL (ref 2.5–4.5)
POTASSIUM SERPL-SCNC: 4.2 MMOL/L (ref 3.5–5.2)
PROT SERPL-MCNC: 6.5 G/DL (ref 6–8.5)
SODIUM SERPL-SCNC: 142 MMOL/L (ref 136–145)

## 2025-02-17 PROCEDURE — 36415 COLL VENOUS BLD VENIPUNCTURE: CPT

## 2025-02-17 PROCEDURE — 80053 COMPREHEN METABOLIC PANEL: CPT

## 2025-02-17 PROCEDURE — 83735 ASSAY OF MAGNESIUM: CPT

## 2025-02-17 PROCEDURE — 84100 ASSAY OF PHOSPHORUS: CPT

## 2025-02-17 PROCEDURE — 25010000002 LEUPROLIDE ACETATE PER 7.5 MG: Performed by: UROLOGY

## 2025-02-17 PROCEDURE — 96402 CHEMO HORMON ANTINEOPL SQ/IM: CPT

## 2025-02-17 RX ORDER — MAGNESIUM OXIDE 400 MG/1
400 TABLET ORAL DAILY
Qty: 3 TABLET | Refills: 0 | Status: SHIPPED | OUTPATIENT
Start: 2025-02-17 | End: 2025-02-20

## 2025-02-17 RX ADMIN — LEUPROLIDE ACETATE 7.5 MG: KIT at 13:22

## 2025-02-17 NOTE — PROGRESS NOTES
Patient here for 1st time dose of Lupron and Xgeva. Xgeva not given today due to Mag of 1.5 and both injections being new. Patient wanted to wait til he came back next month for the 2nd Lupron injection and receive the Xgeva then. Office was notified of Mag level and asked that a script be called into his pharmacy as he did not want to stay for a possible infusion. Dalton Nolen RN

## 2025-02-17 NOTE — TELEPHONE ENCOUNTER
Nurse/Sandy contacts office, regarding patient Francisco Sinclair Magnesium level of 1.5 patient does not want to stay for a magnesium run today and request a RX to be sent to pharmacy for     RX: [facility]

## 2025-02-17 NOTE — TELEPHONE ENCOUNTER
----- Message from Kristian Wilson sent at 2/17/2025  1:26 PM CST -----  Magnesium oxide 400 mg p.o. daily for 3 days.

## 2025-02-24 ENCOUNTER — PROCEDURE VISIT (OUTPATIENT)
Dept: UROLOGY | Facility: CLINIC | Age: 80
End: 2025-02-24
Payer: MEDICARE

## 2025-02-24 VITALS — SYSTOLIC BLOOD PRESSURE: 145 MMHG | OXYGEN SATURATION: 97 % | DIASTOLIC BLOOD PRESSURE: 84 MMHG | HEART RATE: 80 BPM

## 2025-02-24 DIAGNOSIS — R33.9 URINARY RETENTION: Primary | ICD-10-CM

## 2025-02-24 PROCEDURE — 51702 INSERT TEMP BLADDER CATH: CPT

## 2025-02-24 NOTE — PROGRESS NOTES
Pt. Returned to clinic this afternoon, c/o his catheter not feeling right. States it has drained but only small output although pt. States he has not drank very much since leaving office this morning. I flushed his catheter with sterile water and urine return was clear and yellow, catheter seems to be draining without issue. I withdrew 10cc sterile water from the balloon and re-positioned the catheter and then re-inflated the balloon with 10 cc of sterile water. I then flushed the catheter again, without difficulty and it drained without issue. Pt. States his catheter feels better now. I re-attached the catheter to a leg bag an secured it with securement device to pt.'s leg. SUJEY Troy RN

## 2025-02-24 NOTE — PROGRESS NOTES
Francisco Sinclair is here today for catheter change.  Patient is seen by Dr. Pat.  The old catheter was removed without difficulty. Using sterile technique the patient was prepped with iodine and new 20 fr. coude catheter was placed. 10 cc of sterile water used to inflated the balloon and catheter was then attached to a leg bag. Patient tolerated the procedure well.  Angella RAMOS was in the office at the time of procedure. The patient was advised to return in 1 month for next catheter change. Patient verbalized understanding. SUJEY Troy RN     I have reviewed and agree with medical assistance documentation above

## 2025-03-05 NOTE — PROGRESS NOTES
Subjective    Mr. Sinclair is 79 y.o. male    Chief Complaint: Prostate Cancer     History of Present Illness  Patient here to discuss prostate cancer. Patient underwent a MRI fusion biopsy 1/2/2025 which revealed Hubbell 4+ 5 = adenocarcinoma the prostate 13/13 cores. . This was done because of a grossly abnormal digital rectal exam. Volume 84.6cc.  PSA 7.36. PET/CT 1/29/2025 shows concerning ruben involvement in the pelvis as well as mediastinum left axillary and left subclavicular regions. In addition there are several foci of abnormal tracer uptake with a sclerotic lesion located the right anterior seventh rib consistent with bony metastatic disease. Patient has been catheter dependent since biopsy with multiple failed voiding trials. Patient on Lupron and Zytiga. Patient has not started zytiga yet.      Lab Results   Component Value Date    PSA 12.000 (H) 02/10/2025       The following portions of the patient's history were reviewed and updated as appropriate: allergies, current medications, past family history, past medical history, past social history, past surgical history and problem list.    Review of Systems      Current Outpatient Medications:     abiraterone acetate (ZYTIGA) 250 MG tablet, Take 4 tablets by mouth Daily. Take on empty stomach; either 1 hour before or 2 hours after a meal., Disp: 120 tablet, Rfl: 5    amLODIPine (NORVASC) 2.5 MG tablet, Take 1 tablet by mouth Daily., Disp: , Rfl:     Aspirin 81 MG capsule, Take 81 mg by mouth Daily., Disp: , Rfl:     atorvastatin (LIPITOR) 80 MG tablet, Take 0.5 tablets by mouth Every Night., Disp: , Rfl:     carvedilol (COREG) 12.5 MG tablet, Take 0.5 tablets by mouth 2 (Two) Times a Day With Meals., Disp: , Rfl:     Coenzyme Q10 (Co Q 10) 100 MG capsule, Take 100 mg by mouth Daily., Disp: , Rfl:     dipyridamole (PERSANTINE) 75 MG tablet, Take 1 tablet by mouth 3 (Three) Times a Day., Disp: , Rfl:     Flomax 0.4 MG capsule 24 hr capsule, Take 1 capsule by  mouth Daily., Disp: , Rfl:     HYDROcodone-acetaminophen (NORCO) 5-325 MG per tablet, Take 1 tablet by mouth Every 4 (Four) Hours As Needed for Moderate Pain., Disp: 10 tablet, Rfl: 0    ketorolac (TORADOL) 10 MG tablet, Take 1 tablet by mouth Every 6 (Six) Hours As Needed for Moderate Pain (post op pain)., Disp: 10 tablet, Rfl: 0    lisinopril (PRINIVIL,ZESTRIL) 20 MG tablet, Take 0.5 tablets by mouth Daily., Disp: , Rfl:     multivitamin with minerals (Multivitamin Adults) tablet tablet, Take 1 tablet by mouth Daily. Centrum Silver Men 50+ Multivitamin, Disp: , Rfl:     Omega-3 Fatty Acids (FISH OIL) 1000 MG capsule capsule, Take 1 capsule by mouth Daily With Breakfast., Disp: , Rfl:     omeprazole (priLOSEC) 20 MG capsule, Take 1 capsule by mouth Daily., Disp: , Rfl:     predniSONE (DELTASONE) 5 MG tablet, Take 1 tablet by mouth 2 (Two) Times a Day. Start taking medication, the same day as abiraterone [Zytiga]., Disp: 60 tablet, Rfl: 5    predniSONE (DELTASONE) 5 MG tablet, Take 1 tablet by mouth 2 (Two) Times a Day. Start taking medication, the same day as abiraterone [Zytiga]., Disp: 60 tablet, Rfl: 5    ranolazine (RANEXA) 1000 MG 12 hr tablet, Take 1 tablet by mouth 2 (Two) Times a Day., Disp: , Rfl:     abiraterone acetate (ZYTIGA) 250 MG tablet, Take 4 tablets by mouth Daily. Take on empty stomach; either 1 hour before or 2 hours after a meal., Disp: 120 tablet, Rfl: 5    Past Medical History:   Diagnosis Date    CAD (coronary artery disease)     Diverticulosis     Elevated cholesterol     GERD (gastroesophageal reflux disease)     History of adenomatous polyp of colon     History of colon polyps     Hyperlipidemia     Hypertension        Past Surgical History:   Procedure Laterality Date    CARDIAC CATHETERIZATION      COLONOSCOPY  08/04/2014    Diverticulosis; Questionable polyp on the ileocecal valve-path showed fragments of benign small intestinal mucosa with submucosa, no adenomatous changes  "identified; One 3mm adenomatous polyp in the ascending colon; One 6mm adenomatous polyp in the hepatic flexure; Repeat 5 years    COLONOSCOPY  2010    1cm hyperplastic polyp in the distal transverse colon; Diverticulosis; Repeat 4-5 years    COLONOSCOPY  2007    Diverticulosis; Internal hemorrhoids; 7mm hyperplastic rectal polyp; Repeat 3 years    COLONOSCOPY N/A 2019    Two 4mm tubular adenomatous polyps in the transverse colon; Diverticulosis in the left colon; Repeat 5 years    COLONOSCOPY N/A 2024    Procedure: COLONOSCOPY WITH ANESTHESIA;  Surgeon: Ana Oliva MD;  Location: [facility] ENDOSCOPY;  Service: Gastroenterology;  Laterality: N/A;  preop; hx of polyps  postop diverticulosis; polyps   PCP Kurt Pichardo    CORONARY ARTERY BYPASS GRAFT      [place], [place]    ENDOSCOPY N/A 2024    Procedure: ESOPHAGOGASTRODUODENOSCOPY WITH ANESTHESIA;  Surgeon: Ana Oliva MD;  Location: [facility] ENDOSCOPY;  Service: Gastroenterology;  Laterality: N/A;  preop; black stools;   postop  PCP Kurt Pichardo    FOOT SURGERY      PROSTATE BIOPSY N/A 2025    Procedure: PROSTATE ULTRASOUND BIOPSY MRI FUSION WITH URONAV;  Surgeon: Campos Pat MD;  Location: [facility] OR;  Service: Urology;  Laterality: N/A;       Social History     Socioeconomic History    Marital status:    Tobacco Use    Smoking status: Former     Current packs/day: 0.00     Types: Cigarettes     Quit date: 2003     Years since quittin.7    Smokeless tobacco: Never   Vaping Use    Vaping status: Never Used   Substance and Sexual Activity    Alcohol use: No    Drug use: Defer    Sexual activity: Defer       Family History   Problem Relation Age of Onset    Colon cancer Neg Hx     Colon polyps Neg Hx     Esophageal cancer Neg Hx     Liver cancer Neg Hx     Liver disease Neg Hx     Rectal cancer Neg Hx     Stomach cancer Neg Hx        Objective    Temp 97.4 °F (36.3 °C)   Ht 180.3 cm (71\")   Wt 86 kg (189 lb " 9.6 oz)   BMI 26.44 kg/m²     Physical Exam        Results for orders placed or performed in visit on 02/17/25   Comprehensive Metabolic Panel    Collection Time: 02/17/25 12:47 PM    Specimen: Arm, Right; Blood   Result Value Ref Range    Glucose 89 65 - 99 mg/dL    BUN 19 8 - 23 mg/dL    Creatinine 1.16 0.76 - 1.27 mg/dL    Sodium 142 136 - 145 mmol/L    Potassium 4.2 3.5 - 5.2 mmol/L    Chloride 105 98 - 107 mmol/L    CO2 29.0 22.0 - 29.0 mmol/L    Calcium 8.8 8.6 - 10.5 mg/dL    Total Protein 6.5 6.0 - 8.5 g/dL    Albumin 3.9 3.5 - 5.2 g/dL    ALT (SGPT) 10 1 - 41 U/L    AST (SGOT) 16 1 - 40 U/L    Alkaline Phosphatase 67 39 - 117 U/L    Total Bilirubin 0.4 0.0 - 1.2 mg/dL    Globulin 2.6 gm/dL    A/G Ratio 1.5 g/dL    BUN/Creatinine Ratio 16.4 7.0 - 25.0    Anion Gap 8.0 5.0 - 15.0 mmol/L    eGFR 63.3 >60.0 mL/min/1.73   Magnesium    Collection Time: 02/17/25 12:47 PM    Specimen: Arm, Right; Blood   Result Value Ref Range    Magnesium 1.5 (L) 1.6 - 2.4 mg/dL   Phosphorus    Collection Time: 02/17/25 12:47 PM    Specimen: Arm, Right; Blood   Result Value Ref Range    Phosphorus 3.5 2.5 - 4.5 mg/dL     Assessment and Plan    Diagnoses and all orders for this visit:    1. Prostate cancer (Primary)  -     PSA DIAGNOSTIC; Future    2. Urinary retention    3. BPH with urinary obstruction          Patient with metastatic prostate cancer based on pet imaging.  In addition he has had urinary retention since his biopsy.  I recommend treatment with ADT hopefully this will also shrink up his prostate and allow him to urinate.     Patient has received Xgeva as well as Lupron shot.  Has not started Zytiga secondary to issues getting the medications.  I did discuss suprapubic tube versus TURP versus continued Santana drainage and voiding trial in 3 months.  Patient was to try voiding trial in 3 months.    He will follow-up in 3 months with PSA and a voiding trial.  Continue Flomax.

## 2025-03-10 ENCOUNTER — TELEPHONE (OUTPATIENT)
Dept: ONCOLOGY | Facility: CLINIC | Age: 80
End: 2025-03-10
Payer: MEDICARE

## 2025-03-10 DIAGNOSIS — C61 PROSTATE CANCER: Primary | ICD-10-CM

## 2025-03-10 RX ORDER — PREDNISONE 5 MG/1
5 TABLET ORAL 2 TIMES DAILY
Qty: 60 TABLET | Refills: 5 | Status: SHIPPED | OUTPATIENT
Start: 2025-03-10

## 2025-03-10 RX ORDER — ABIRATERONE ACETATE 250 MG/1
1000 TABLET ORAL DAILY
Qty: 120 TABLET | Refills: 5 | Status: SHIPPED | OUTPATIENT
Start: 2025-03-10 | End: 2025-03-17 | Stop reason: SDUPTHER

## 2025-03-10 NOTE — TELEPHONE ENCOUNTER
Call from [facility] with the [facility] to report that previously the zytiga and prednisone was not covered by the [facility]. Pt did not have an auth for our office. She requests that we resend the prescription to them

## 2025-03-13 DIAGNOSIS — C61 PROSTATE CANCER: Primary | ICD-10-CM

## 2025-03-17 ENCOUNTER — LAB (OUTPATIENT)
Dept: LAB | Facility: HOSPITAL | Age: 80
End: 2025-03-17
Payer: MEDICARE

## 2025-03-17 ENCOUNTER — OFFICE VISIT (OUTPATIENT)
Dept: UROLOGY | Facility: CLINIC | Age: 80
End: 2025-03-17
Payer: MEDICARE

## 2025-03-17 ENCOUNTER — INFUSION (OUTPATIENT)
Dept: ONCOLOGY | Facility: HOSPITAL | Age: 80
End: 2025-03-17
Payer: MEDICARE

## 2025-03-17 VITALS — BODY MASS INDEX: 26.54 KG/M2 | WEIGHT: 189.6 LBS | TEMPERATURE: 97.4 F | HEIGHT: 71 IN

## 2025-03-17 VITALS
SYSTOLIC BLOOD PRESSURE: 156 MMHG | TEMPERATURE: 96.5 F | OXYGEN SATURATION: 100 % | HEART RATE: 75 BPM | HEIGHT: 71 IN | DIASTOLIC BLOOD PRESSURE: 77 MMHG | RESPIRATION RATE: 16 BRPM | WEIGHT: 183 LBS | BODY MASS INDEX: 25.62 KG/M2

## 2025-03-17 DIAGNOSIS — R33.9 URINARY RETENTION: ICD-10-CM

## 2025-03-17 DIAGNOSIS — C61 PROSTATE CANCER: ICD-10-CM

## 2025-03-17 DIAGNOSIS — N13.8 BPH WITH URINARY OBSTRUCTION: ICD-10-CM

## 2025-03-17 DIAGNOSIS — R97.20 ELEVATED PROSTATE SPECIFIC ANTIGEN (PSA): ICD-10-CM

## 2025-03-17 DIAGNOSIS — C61 PROSTATE CANCER: Primary | ICD-10-CM

## 2025-03-17 DIAGNOSIS — N40.1 BPH WITH URINARY OBSTRUCTION: ICD-10-CM

## 2025-03-17 LAB
ALBUMIN SERPL-MCNC: 4 G/DL (ref 3.5–5.2)
ALBUMIN/GLOB SERPL: 1.5 G/DL
ALP SERPL-CCNC: 66 U/L (ref 39–117)
ALT SERPL W P-5'-P-CCNC: 12 U/L (ref 1–41)
ANION GAP SERPL CALCULATED.3IONS-SCNC: 9 MMOL/L (ref 5–15)
AST SERPL-CCNC: 15 U/L (ref 1–40)
BASOPHILS # BLD AUTO: 0.01 10*3/MM3 (ref 0–0.2)
BASOPHILS NFR BLD AUTO: 0.2 % (ref 0–1.5)
BILIRUB SERPL-MCNC: 0.6 MG/DL (ref 0–1.2)
BUN SERPL-MCNC: 23 MG/DL (ref 8–23)
BUN/CREAT SERPL: 20.7 (ref 7–25)
CALCIUM SPEC-SCNC: 9.1 MG/DL (ref 8.6–10.5)
CHLORIDE SERPL-SCNC: 104 MMOL/L (ref 98–107)
CO2 SERPL-SCNC: 28 MMOL/L (ref 22–29)
CREAT SERPL-MCNC: 1.11 MG/DL (ref 0.76–1.27)
DEPRECATED RDW RBC AUTO: 47.7 FL (ref 37–54)
EGFRCR SERPLBLD CKD-EPI 2021: 66.7 ML/MIN/1.73
EOSINOPHIL # BLD AUTO: 0.09 10*3/MM3 (ref 0–0.4)
EOSINOPHIL NFR BLD AUTO: 1.7 % (ref 0.3–6.2)
ERYTHROCYTE [DISTWIDTH] IN BLOOD BY AUTOMATED COUNT: 14.2 % (ref 12.3–15.4)
GLOBULIN UR ELPH-MCNC: 2.7 GM/DL
GLUCOSE SERPL-MCNC: 106 MG/DL (ref 65–99)
HCT VFR BLD AUTO: 39 % (ref 37.5–51)
HGB BLD-MCNC: 13 G/DL (ref 13–17.7)
IMM GRANULOCYTES # BLD AUTO: 0.02 10*3/MM3 (ref 0–0.05)
IMM GRANULOCYTES NFR BLD AUTO: 0.4 % (ref 0–0.5)
LYMPHOCYTES # BLD AUTO: 1.23 10*3/MM3 (ref 0.7–3.1)
LYMPHOCYTES NFR BLD AUTO: 22.6 % (ref 19.6–45.3)
MAGNESIUM SERPL-MCNC: 1.8 MG/DL (ref 1.6–2.4)
MCH RBC QN AUTO: 30.8 PG (ref 26.6–33)
MCHC RBC AUTO-ENTMCNC: 33.3 G/DL (ref 31.5–35.7)
MCV RBC AUTO: 92.4 FL (ref 79–97)
MONOCYTES # BLD AUTO: 0.35 10*3/MM3 (ref 0.1–0.9)
MONOCYTES NFR BLD AUTO: 6.4 % (ref 5–12)
NEUTROPHILS NFR BLD AUTO: 3.75 10*3/MM3 (ref 1.7–7)
NEUTROPHILS NFR BLD AUTO: 68.7 % (ref 42.7–76)
PHOSPHATE SERPL-MCNC: 3.3 MG/DL (ref 2.5–4.5)
PLATELET # BLD AUTO: 142 10*3/MM3 (ref 140–450)
PMV BLD AUTO: 9.7 FL (ref 6–12)
POTASSIUM SERPL-SCNC: 4.3 MMOL/L (ref 3.5–5.2)
PROT SERPL-MCNC: 6.7 G/DL (ref 6–8.5)
RBC # BLD AUTO: 4.22 10*6/MM3 (ref 4.14–5.8)
SODIUM SERPL-SCNC: 141 MMOL/L (ref 136–145)
WBC NRBC COR # BLD AUTO: 5.45 10*3/MM3 (ref 3.4–10.8)

## 2025-03-17 PROCEDURE — 1159F MED LIST DOCD IN RCRD: CPT | Performed by: UROLOGY

## 2025-03-17 PROCEDURE — 85025 COMPLETE CBC W/AUTO DIFF WBC: CPT

## 2025-03-17 PROCEDURE — 96402 CHEMO HORMON ANTINEOPL SQ/IM: CPT

## 2025-03-17 PROCEDURE — 1160F RVW MEDS BY RX/DR IN RCRD: CPT | Performed by: UROLOGY

## 2025-03-17 PROCEDURE — 83735 ASSAY OF MAGNESIUM: CPT

## 2025-03-17 PROCEDURE — 84100 ASSAY OF PHOSPHORUS: CPT

## 2025-03-17 PROCEDURE — 36415 COLL VENOUS BLD VENIPUNCTURE: CPT

## 2025-03-17 PROCEDURE — 96372 THER/PROPH/DIAG INJ SC/IM: CPT

## 2025-03-17 PROCEDURE — 80053 COMPREHEN METABOLIC PANEL: CPT

## 2025-03-17 PROCEDURE — 99214 OFFICE O/P EST MOD 30 MIN: CPT | Performed by: UROLOGY

## 2025-03-17 PROCEDURE — 25010000002 LEUPROLIDE 45 MG KIT: Performed by: UROLOGY

## 2025-03-17 PROCEDURE — 25010000002 DENOSUMAB 120 MG/1.7ML SOLUTION: Performed by: INTERNAL MEDICINE

## 2025-03-17 RX ORDER — ABIRATERONE ACETATE 250 MG/1
1000 TABLET ORAL DAILY
Qty: 120 TABLET | Refills: 5 | Status: SHIPPED | OUTPATIENT
Start: 2025-03-17

## 2025-03-17 RX ADMIN — DENOSUMAB 120 MG: 120 INJECTION SUBCUTANEOUS at 10:04

## 2025-03-17 RX ADMIN — LEUPROLIDE ACETATE 45 MG: KIT at 10:08

## 2025-03-24 ENCOUNTER — PROCEDURE VISIT (OUTPATIENT)
Dept: UROLOGY | Facility: CLINIC | Age: 80
End: 2025-03-24
Payer: MEDICARE

## 2025-03-24 VITALS — DIASTOLIC BLOOD PRESSURE: 88 MMHG | SYSTOLIC BLOOD PRESSURE: 156 MMHG | OXYGEN SATURATION: 99 % | HEART RATE: 70 BPM

## 2025-03-24 DIAGNOSIS — R33.9 URINARY RETENTION: Primary | ICD-10-CM

## 2025-03-24 NOTE — PROGRESS NOTES
"[facility] ONC [facility] HEMATOLOGY & ONCOLOGY  [address]  [facility] [facility] [address] [phone]    Patient Name: Francisco Sinclair  Encounter Date: 03/31/2025  YOB: 1945  Patient Number: [id number]        REASON FOR VISIT:   Francisco \"García\" Yahir is an 79 yoM who returns in follow-up of S prostate adenocarcinoma -original tumor stage:  IV- Bright 4+ 5 = 13/13 cores.  On 2/10/2025- The patient was seen for management considerations. Started: Prednisone 5 mg p.o. twice daily+ abiraterone 1000 mg p.o. daily. On 2/17/2025-Lupron 7.5 mg IM. On 3/17/2025-Lupron 45 mg IM.  Here alone (previously accompanied by his daughter-in-law, Angus Sinclair)    I have reviewed the HPI and verified with the patient the accuracy of it. No changes to interval history since the information was documented. Josh Santa MD 03/31/25      Diagnostic abnormalities:  - CAD, prior CABG, hyperlipidemia, GERD    -  Due to grossly abnormal digital rectal exam and PSA >7:  - 1/2/2025- MRI fusion biopsy-[place] 4+ 5 = adenocarcinoma the prostate 13/13 cores:    Final diagnosis- 1. Prostate, right mid base, needle biopsy:  - Adenocarcinoma with mixed micropapillary ductal and acinar components, [place] grade 4+3 = 7 (Grade Group 3).  -[place] myxoid stromal changes with hemosiderin-laden macrophages.  - Tumor discontinuously involves approximately 50% of the total linear length of the submitted tissue.  Comment: Immunohistochemical stain for p63 confirms the absence of a basal cell lining.  2. Prostate, right lateral base, needle biopsy:  - Adenocarcinoma, acinar type, [place] grade 4+3 = 7 (Grade Group 3).  -[place] myxoid stromal changes and focal xanthogranulomata.  - Tumor discontinuously involves approximately 90% of the linear length of the submitted tissue.  Comment: Immunohistochemical stain for p63 confirms the absence of a basal cell lining in the areas of concern.  3. Prostate, " right mid medial, needle biopsy:  - Adenocarcinoma, acinar type, [place] grade 4+3 = 7 (Grade Group 3).  - [place] myxoid stromal changes and hemosiderin-laden macrophages.  - Tumor discontinuously involves approximately 45% of the linear length of the submitted tissue.  4. Prostate, right mid lateral, needle biopsy:  - Adenocarcinoma, acinar type, Bright grade 3+4 = 7 (Grade Group 2).  - Tumor discontinuously involves approximately 30% of the linear length of th  5. Prostate, right mid apex, needle biopsy:  - Adenocarcinoma, acinar type, [place] grade 4+5 = 9 (Grade Group 5).  - Focal xanthogranuloma.  - Tumor discontinuously involves approximately 70% of the total linear length of the submitted tissue.  6. Prostate, right lateral apex, needle biopsy:  - Adenocarcinoma, acinar type, [place] grade 4+5 = 9 (Grade Group 5).  - [place] stromal myxoid changes.  - Tumor discontinuously involves approximately 40% of the linear length of the submitted tissue.  7. Prostate, left mid base, needle biopsy:  - Adenocarcinoma with mixed acinar and ductal components, Bright grade 3+4 = 7 (Grade Group 2).  - Tumor involves approximately 70% of the linear length of the submitted tissue.  8. Prostate, left lateral base, needle biopsy:  - Adenocarcinoma, acinar type, [place] grade 4+4 = 8 (Grade Group 4).  - Tumor involves approximately 15% of the linear length of the submitted tissue.  9. Prostate, left mid medial, needle biopsy:  - Adenocarcinoma, ductal type, [place] grade 4+4 = 8 (Grade Group 4).  - Tumor involves approximately 10% of the submitted tissue.  10. Prostate, right mid lateral, needle biopsy:  - Adenocarcinoma, acinar type, [place] grade 4+3 = 7 (Grade Group 3).  - Tumor discontinuously involves approximately 60% of the linear length of the submitted tissue.  11. Prostate, left mid apex, needle biopsy:  - Adenocarcinoma with mixed acinar and ductal components, [place] grade 3+4 = 7 (Grade Group 2).  - [place] myxoid  stromal changes.  - Tumor discontinuously involves approximately 50% of the linear length of the submitted tissue.  12. Prostate, left lateral apex, needle biopsy:  - Adenocarcinoma, acinar type, Bright grade 4+4 = 8 (Grade Group 4).  - [place] myxoid stromal changes.  - Tumor discontinuously involves approximately 80% of the linear length of the submitted tissue.  13. Prostate, lesion 1, targets 1 through 4, needle biopsies:  - Adenocarcinoma with mixed acinar and ductal components, Bright grade 4+3 = 7 (Grade Group 3).  - [place] myxoid stromal changes and hemosiderin-laden macrophages.  - Tumor discontinuously involves approximately 50% of the total linear length of the submitted tissue.  AJCC stage: pTX pNX    - 1/9/2025- CT abdomen/pelvis- A poorly defined mass between the enlarged prostate and the rectum which shows ill-defined septated low density areas was not noted in the previous study and may represent a mass from the prostate involving the rectum. There is complete loss of fat plane between the prostate and the rectum. Moderate diffuse thickening of the wall of the mid and distal rectum may be due to inflammatory/infectious process or involvement by the prostatic neoplasm. Details given above.Evolving metastatic lesions of the liver which have increased in size since the previous study in October 2024. There is significant dilatation of the urinary bladder which reaches into the abdomen to the level of the umbilicus. This is probably due to outlet obstruction from the enlarged prostate. There is evidence of bilateral hydronephrosis and hydroureter probably due to vesicoureteral  reflux. This was not noted in the previous study.Cholelithiasis. No finding to suggest cholecystitis. Persistent and unchanged retroperitoneal para-aortic lymphadenopathy.This may represent metastatic lymphadenopathy?    -  1/9/2025- Glucose 116, bili 1.4 otherwise normal CMP. CBC normal      - 1/29/2025- PET/CT- Tracer uptake at  the LEFT posterior and lateral prostate consistent with known malignancy. Local invasion of seminal vesicle demonstrated on prior MRI pelvis 12/6/2024. Appearance on today's exam is also concerning for involvement of the LEFT anterior lateral rectum. Appearance concerning for ruben involvement at least of the pelvis and appears to involve mediastinal, LEFT axillary, and LEFT subclavicular regions. Evidence for bony metastatic disease.  -2/10/2025-hemoglobin 12.1 otherwise normal CBC.  B12 460, folate 16.2, ferritin 231, iron 36, iron saturation 14, TIBC 261, PSA 12, testosterone 426  - 2/17/2025-CMP normal magnesium 1.5 (Mag-Ox called in)    Previous interventions:  - 2/3/2025- Consult Dr. Pat, urology-A/P: Patient with metastatic prostate cancer based on pet imaging. I personally reviewed these images today. In addition he has had urinary retention since his biopsy. I recommend treatment with ADT hopefully this will also shrink up his prostate and allow him to urinate. I do not think he be a candidate for radiation given his multiple metastatic sites. I have made a referral to oncology for oral ADT treatment.     - 2/10/2025- The patient is seen for management considerations. Started:    Prednisone 5 mg p.o. twice daily with abiraterone # 60 x 11 refills  Abiraterone 1000 mg p.o. dailyx 11 refills    - 2/17/2025-Lupron 7.5 mg IM  - 3/17/2025-Lupron 45 mg IM    LABS    Lab Results - Last 18 Months   Lab Units 03/17/25  0921 02/10/25  1607 01/09/25  1023 12/20/24  0951   HEMOGLOBIN g/dL 13.0 12.1* 14.1 14.1   HEMATOCRIT % 39.0 37.4* 41.2 41.7   MCV fL 92.4 93.0 90.2 94.1   WBC 10*3/mm3 5.45 3.93 9.72 4.16   RDW % 14.2 14.6 13.3 13.3   MPV fL 9.7 9.8 10.9 10.4   PLATELETS 10*3/mm3 142 149 166 131*   IMM GRAN % % 0.4 0.3 0.4  --    NEUTROS ABS 10*3/mm3 3.75 2.22 7.41*  --    LYMPHS ABS 10*3/mm3 1.23 1.19 1.37  --    MONOS ABS 10*3/mm3 0.35 0.34 0.86  --    EOS ABS 10*3/mm3 0.09 0.16 0.02  --    BASOS ABS 10*3/mm3 0.01  0.01 0.02  --    IMMATURE GRANS (ABS) 10*3/mm3 0.02 0.01 0.04  --    NRBC /100 WBC  --  0.0 0.0  --        Lab Results - Last 18 Months   Lab Units 03/17/25  0921 02/17/25  1247 02/10/25  1607 01/09/25  1023 12/20/24  0951 12/06/24  0751   GLUCOSE mg/dL 106* 89 82 116* 97  --    SODIUM mmol/L 141 142 142 140 143  --    POTASSIUM mmol/L 4.3 4.2 3.9 4.2 4.3  --    CO2 mmol/L 28.0 29.0 28.0 23.0 27.0  --    CHLORIDE mmol/L 104 105 106 103 106  --    ANION GAP mmol/L 9.0 8.0 8.0 14.0 10.0  --    CREATININE mg/dL 1.11 1.16 1.15 1.06 1.14 1.30   BUN mg/dL 23 19 17 18 16  --    BUN / CREAT RATIO  20.7 16.4 14.8 17.0 14.0  --    CALCIUM mg/dL 9.1 8.8 8.4* 9.3 8.9  --    ALK PHOS U/L 66 67 75 67  --   --    TOTAL PROTEIN g/dL 6.7 6.5 6.5 7.1  --   --    ALT (SGPT) U/L 12 10 10 20  --   --    AST (SGOT) U/L 15 16 15 26  --   --    BILIRUBIN mg/dL 0.6 0.4 0.4 1.4*  --   --    ALBUMIN g/dL 4.0 3.9 3.6 4.0  --   --    GLOBULIN gm/dL 2.7 2.6 2.9 3.1  --   --          PAST MEDICAL HISTORY:  ALLERGIES:  Allergies   Allergen Reactions    Rosuvastatin Myalgia     Muscle spasms     CURRENT MEDICATIONS:  Outpatient Encounter Medications as of 3/31/2025   Medication Sig Dispense Refill    abiraterone acetate (ZYTIGA) 250 MG tablet Take 4 tablets by mouth Daily. Take on empty stomach; either 1 hour before or 2 hours after a meal. 120 tablet 5    amLODIPine (NORVASC) 2.5 MG tablet Take 1 tablet by mouth Daily.      Aspirin 81 MG capsule Take 81 mg by mouth Daily.      atorvastatin (LIPITOR) 80 MG tablet Take 0.5 tablets by mouth Every Night.      carvedilol (COREG) 12.5 MG tablet Take 0.5 tablets by mouth 2 (Two) Times a Day With Meals.      Coenzyme Q10 (Co Q 10) 100 MG capsule Take 100 mg by mouth Daily.      dipyridamole (PERSANTINE) 75 MG tablet Take 1 tablet by mouth 3 (Three) Times a Day.      Flomax 0.4 MG capsule 24 hr capsule Take 1 capsule by mouth Daily.      HYDROcodone-acetaminophen (NORCO) 5-325 MG per tablet Take 1 tablet  by mouth Every 4 (Four) Hours As Needed for Moderate Pain. 10 tablet 0    ketorolac (TORADOL) 10 MG tablet Take 1 tablet by mouth Every 6 (Six) Hours As Needed for Moderate Pain (post op pain). 10 tablet 0    lisinopril (PRINIVIL,ZESTRIL) 20 MG tablet Take 0.5 tablets by mouth Daily.      multivitamin with minerals (Multivitamin Adults) tablet tablet Take 1 tablet by mouth Daily. Centrum Silver Men 50+ Multivitamin      Omega-3 Fatty Acids (FISH OIL) 1000 MG capsule capsule Take 1 capsule by mouth Daily With Breakfast.      omeprazole (priLOSEC) 20 MG capsule Take 1 capsule by mouth Daily.      predniSONE (DELTASONE) 5 MG tablet Take 1 tablet by mouth 2 (Two) Times a Day. Start taking medication, the same day as abiraterone [Zytiga]. 60 tablet 5    predniSONE (DELTASONE) 5 MG tablet Take 1 tablet by mouth 2 (Two) Times a Day. Start taking medication, the same day as abiraterone [Zytiga]. 60 tablet 5    ranolazine (RANEXA) 1000 MG 12 hr tablet Take 1 tablet by mouth 2 (Two) Times a Day. (Patient not taking: Reported on 3/31/2025)       No facility-administered encounter medications on file as of 3/31/2025.     ADULT ILLNESSES:  Patient Active Problem List   Diagnosis Code    Hx of colonic polyps Z86.0100    Black stools K92.1    Abnormal MRI, pelvis R93.5    Elevated prostate specific antigen (PSA) R97.20    Prostate cancer C61     SURGERIES:  Past Surgical History:   Procedure Laterality Date    CARDIAC CATHETERIZATION      COLONOSCOPY  08/04/2014    Diverticulosis; Questionable polyp on the ileocecal valve-path showed fragments of benign small intestinal mucosa with submucosa, no adenomatous changes identified; One 3mm adenomatous polyp in the ascending colon; One 6mm adenomatous polyp in the hepatic flexure; Repeat 5 years    COLONOSCOPY  07/14/2010    1cm hyperplastic polyp in the distal transverse colon; Diverticulosis; Repeat 4-5 years    COLONOSCOPY  06/13/2007    Diverticulosis; Internal hemorrhoids; 7mm  hyperplastic rectal polyp; Repeat 3 years    COLONOSCOPY N/A 07/31/2019    Two 4mm tubular adenomatous polyps in the transverse colon; Diverticulosis in the left colon; Repeat 5 years    COLONOSCOPY N/A 6/7/2024    Procedure: COLONOSCOPY WITH ANESTHESIA;  Surgeon: Ana Oliva MD;  Location: [facility] ENDOSCOPY;  Service: Gastroenterology;  Laterality: N/A;  preop; hx of polyps  postop diverticulosis; polyps   PCP Kurt Pichardo    CORONARY ARTERY BYPASS GRAFT  2003    [place], [place]    ENDOSCOPY N/A 6/7/2024    Procedure: ESOPHAGOGASTRODUODENOSCOPY WITH ANESTHESIA;  Surgeon: Ana Oliva MD;  Location: [facility] ENDOSCOPY;  Service: Gastroenterology;  Laterality: N/A;  preop; black stools;   postop  PCP Kurt Pichardo    FOOT SURGERY      PROSTATE BIOPSY N/A 1/2/2025    Procedure: PROSTATE ULTRASOUND BIOPSY MRI FUSION WITH URONAV;  Surgeon: Campos Pat MD;  Location: [facility] OR;  Service: Urology;  Laterality: N/A;     HEALTH MAINTENANCE ITEMS:  Health Maintenance Due   Topic Date Due    Pneumococcal Vaccine 50+ (1 of 1 - PCV) Never done    TDAP/TD VACCINES (1 - Tdap) 04/12/2011    ZOSTER VACCINE (2 of 3) 06/25/2014    RSV Vaccine - Adults (1 - 1-dose 75+ series) Never done    ANNUAL WELLNESS VISIT  06/18/2019    COVID-19 Vaccine (5 - 2024-25 season) 09/01/2024       <no information>  Last Completed Colonoscopy            Upcoming       COLORECTAL CANCER SCREENING (COLONOSCOPY - Every 3 Years) Next due on 6/7/2027 06/07/2024  Surgical Procedure: COLONOSCOPY    06/07/2024  Colonoscopy    07/31/2019  Surgical Procedure: COLONOSCOPY    07/31/2019  COLONOSCOPY    08/04/2014  SCANNED - COLONOSCOPY     Only the first 5 history entries have been loaded, but more history exists.                        Immunization History   Administered Date(s) Administered    COVID-19 (MODERNA) BIVALENT 12+YRS 10/11/2022    COVID-19 (MODERNA) Monovalent Original Booster 12/14/2021     Last Completed Mammogram    This patient has  "no relevant Health Maintenance data.           FAMILY HISTORY:  Family History   Problem Relation Age of Onset    Colon cancer Neg Hx     Colon polyps Neg Hx     Esophageal cancer Neg Hx     Liver cancer Neg Hx     Liver disease Neg Hx     Rectal cancer Neg Hx     Stomach cancer Neg Hx      SOCIAL HISTORY:  Social History     Socioeconomic History    Marital status:    Tobacco Use    Smoking status: Former     Current packs/day: 0.00     Types: Cigarettes     Quit date: 2003     Years since quittin.7    Smokeless tobacco: Never   Vaping Use    Vaping status: Never Used   Substance and Sexual Activity    Alcohol use: No    Drug use: Defer    Sexual activity: Defer       REVIEW OF SYSTEMS:  Review of Systems   Constitutional:  Positive for activity change, appetite change and unexpected weight loss (60 pounds in the past 24 months).        Manages his ADLs to include chores, errands and driving.  Is up and about, \"most of the time\".  States he likes working in his garden    Lupron/abiraterone tolerance: Mild fatigue.  Mild hot flashes.  No nausea.  No vomiting.  Edema.  No rash.  No arthralgias.  States he is able to continue   HENT:  Positive for hearing loss.    Eyes: Negative.    Respiratory: Negative.          Ex cigarette smoker, 2 packs/day.  Quit over 30 years ago   Cardiovascular: Negative.    Gastrointestinal: Negative.    Endocrine: Negative.    Genitourinary:  Positive for decreased urine volume, difficulty urinating (Now wears a Santana catheter with a leg bag), nocturia and urinary incontinence.   Musculoskeletal: Negative.    Allergic/Immunologic: Negative.    Neurological: Negative.    Hematological: Negative.    Psychiatric/Behavioral: Negative.     All other systems reviewed and are negative.      /70   Pulse 77   Temp 97.3 °F (36.3 °C) (Temporal)   Resp 18   Ht 180.3 cm (71\")   Wt 84.6 kg (186 lb 9.6 oz)   SpO2 98%   BMI 26.03 kg/m²  Body surface area is 2.05 meters " squared.  Pain Score    03/31/25 0834   PainSc: 0-No pain       Physical Exam  Vitals and nursing note reviewed.   Constitutional:       Comments: Pleasant, cooperative, heavyset, modestly kept elderly male.  Ambulatory.  ECOG 1.      Has lost 3 lb since his initial visit   HENT:      Head: Normocephalic and atraumatic.   Eyes:      General: No scleral icterus.     Extraocular Movements: Extraocular movements intact.      Pupils: Pupils are equal, round, and reactive to light.   Cardiovascular:      Rate and Rhythm: Normal rate.   Pulmonary:      Effort: Pulmonary effort is normal.   Abdominal:      Palpations: Abdomen is soft.      Tenderness: There is no abdominal tenderness.   Genitourinary:     Comments: Patient wearing a Santana catheter attached to a right leg bag  Musculoskeletal:         General: Normal range of motion.      Cervical back: Normal range of motion.   Lymphadenopathy:      Cervical: No cervical adenopathy.   Skin:     General: Skin is warm.   Neurological:      General: No focal deficit present.      Mental Status: He is alert.   Psychiatric:         Mood and Affect: Mood normal.         Behavior: Behavior normal.         Thought Content: Thought content normal.           Assessment:  1.  Prostate adenocarcinoma          -Original Tumor stage:  IV- Bright 4+ 5 = 13/13 cores         -Original tumor burden:  -- 1/9/2025- CT abdomen/pelvis- A poorly defined mass between the enlarged prostate and the rectum which shows ill-defined septated low density areas was not noted in the previous study and may represent a mass from the prostate involving the rectum. There is complete loss of fat plane between the prostate and the rectum. Moderate diffuse thickening of the wall of the mid and distal rectum may be due to inflammatory/infectious process or involvement by the prostatic neoplasm. Details given above.Evolving metastatic lesions of the liver which have increased in size since the previous study in  October 2024. There is significant dilatation of the urinary bladder which reaches into the abdomen to the level of the umbilicus. This is probably due to outlet obstruction from the enlarged prostate. There is evidence of bilateral hydronephrosis and hydroureter probably due to vesicoureteral reflux. This was not noted in the previous study.  Cholelithiasis. No finding to suggest cholecystitis. Persistent and unchanged retroperitoneal para-aortic lymphadenopathy.This may represent metastatic lymphadenopathy?    - 1/29/2025- PET/CT- Tracer uptake at the LEFT posterior and lateral prostate consistent with known malignancy. Local invasion of seminal vesicle demonstrated on prior MRI pelvis 12/6/2024. Appearance on today's exam is also concerning for involvement of the LEFT anterior lateral rectum. Appearance concerning for ruben involvement at least of the pelvis and appears to involve mediastinal, LEFT axillary, and LEFT subclavicular regions. Evidence for bony metastatic disease.      -Tumor status:   - 2/10/2025- Started:    Prednisone 5 mg p.o. twice daily with abiraterone # 60 x 11 refills  Abiraterone 1000 mg p.o. dailyx 11 refills    - 2/17/2025-Lupron 7.5 mg IM  - 3/17/2025-Lupron 45 mg IM    2.   CAD/CABG  3.   Anemia with evidence for anemia of chronic disease   - Hgb 13; MCV 92.4 (prior ashwin: Hgb 12.1; 93)  4.   Mild hot flashes.    5.   Poor prognosis     Recommendations:  1.   Review labs:  -2/10/2025-hemoglobin 12.1 otherwise normal CBC.  B12 460, folate 16.2, ferritin 231, iron 36, iron saturation 14, TIBC 261, PSA 12, testosterone 426  - 2/17/2025-CMP normal magnesium 1.5 (Mag-Ox called in)  - 3/17/2025-Hgb 13 otherwise normal CBC, normal CMP.  PSA/testosterone pending  2.   Prior review available information including the pathology from biopsy, imaging (CT abdomen/pelvis and PET scan above) most recent PSA pending  3.    Review NCCN guidelines prostate cancer -systemic therapy for castration naïve  disease- M1: ADT (LHRH agonist or orchiectomy) with one of the following: Preferred regimens: Apalutamide, abiraterone, enzalutamide (category 1); OR ADT with docetaxel 75 mg/m2 for 6 cycles and one of the following (preferred) abiraterone (category 1), darolutamide (category 1); OR EBRT to the primary tumor bed for low-volume M1 or ADT.  Follow-up: Physical exam with PSA every 3-6 months.  Imaging for symptoms.  Conventional imaging every 3-6 months for patients with M1 to monitor treatment response..     4.   Abiraterone and Lupron tolerance discussed.  Mild fatigue and hot flashes otherwise no inordinate toxicities.  Is willing to press on.  5.  Apprised of the potential toxicities of leuprolide (to include but not limited to: Hot flashes/diaphoresis,transient tumor flare, headache, depression, emotional lability, generalized pain, dyspepsia, edema, weight changes, dizziness, acne, gynecomastia, insomnia, sexual dysfunction, urogenital disorder, paresthesias, neuromuscular disorders, asthenia, anaphylaxis, bone density loss, suicidality, seizures, hepatotoxicity, diabetes, ureteral obstruction, spinal cord compression, myocardial infarction, stroke, sudden death, Q/T prolongation).  He agrees to press on with therapy.    6.   Abiraterone.  Potential toxicities discussed: Hypertension, fluid retention, hypokalemia, QT prolongation, torsade de pointes, arrhythmia, cardiac failure, adrenal insufficiency, hepatotoxicity, hepatitis, hepatic failure, fractures, anaphylaxis, rhabdomyolysis, hypertension, arthralgia, hot flashes, edema, diarrhea, cough, fatigue, myalgia, UTI, constipation, hypokalemia, URI, dyspepsia, elevated liver enzymes, insomnia, confusion, dyspnea, arrhythmia, hematuria, cardiac failure, nocturia, fever, fractures, headache, rash, infection, hypertriglyceridemia, hyperglycemia, anemia, hypercholesterolemia.  Questions answered.  He agrees to press on with therapy.     7.  Rx:      Abiraterone 1000  "mg p.o. dailyx 11 refills  Prednisone 5 mg p.o. twice daily with abiraterone # 60 x 11 refills  Leuprolide or degarelix - per Dr. Pat.     8.  Xgeva 120 mg sc this week then every 4 weeks   9.  Discussed tertiary care referral Re:  if patient wishes a second opinion for management /clinical trials.  For now wants to stay local.  \"Maybe later\"  10.  Return to the office in 12 weeks with pre-office CBC with differential, CMP, testosterone and PSA.         MEDICAL DECISION MAKING: High Complexity   AMOUNT OF DATA: Extensive  RISK OF COMPLICATIONS: Moderate     I spent ~42 minutes caring for Francisco \"García\" on this date of service. This time includes time spent by me in the following activities: preparing for the visit, reviewing tests, performing a medically appropriate examination and/or evaluation, counseling and educating the patient/family/caregiver, ordering medications, tests, or procedures and documenting information in the medical record.           "

## 2025-03-24 NOTE — PROGRESS NOTES
Francisco Sinclair is here today for catheter change.  Patient is seen by Dr. Pat.  The old catheter was removed without difficulty. Using sterile technique the patient was prepped with iodine and new 20 fr. coude catheter was placed. 10 cc of sterile water used to inflated the balloon and catheter was then attached to a leg bag. Patient tolerated the procedure well.  Arjun Wilson PA-C was in the office at the time of procedure. The patient was advised to return in 1 month for next catheter change. Patient verbalized understanding. SUJEY Troy RN     I have reviewed and agree with medical assistance documentation above

## 2025-03-31 ENCOUNTER — OFFICE VISIT (OUTPATIENT)
Dept: ONCOLOGY | Facility: CLINIC | Age: 80
End: 2025-03-31
Payer: OTHER GOVERNMENT

## 2025-03-31 ENCOUNTER — LAB (OUTPATIENT)
Dept: LAB | Facility: HOSPITAL | Age: 80
End: 2025-03-31
Payer: MEDICARE

## 2025-03-31 VITALS
RESPIRATION RATE: 18 BRPM | DIASTOLIC BLOOD PRESSURE: 70 MMHG | OXYGEN SATURATION: 98 % | BODY MASS INDEX: 26.12 KG/M2 | TEMPERATURE: 97.3 F | HEIGHT: 71 IN | WEIGHT: 186.6 LBS | SYSTOLIC BLOOD PRESSURE: 118 MMHG | HEART RATE: 77 BPM

## 2025-03-31 DIAGNOSIS — C61 PROSTATE CANCER: ICD-10-CM

## 2025-03-31 DIAGNOSIS — C61 PROSTATE CANCER: Primary | ICD-10-CM

## 2025-03-31 LAB
ALBUMIN SERPL-MCNC: 3.7 G/DL (ref 3.5–5.2)
ALBUMIN/GLOB SERPL: 1.3 G/DL
ALP SERPL-CCNC: 68 U/L (ref 39–117)
ALT SERPL W P-5'-P-CCNC: 12 U/L (ref 1–41)
ANION GAP SERPL CALCULATED.3IONS-SCNC: 11 MMOL/L (ref 5–15)
AST SERPL-CCNC: 14 U/L (ref 1–40)
BASOPHILS # BLD AUTO: 0.02 10*3/MM3 (ref 0–0.2)
BASOPHILS NFR BLD AUTO: 0.4 % (ref 0–1.5)
BILIRUB SERPL-MCNC: 0.7 MG/DL (ref 0–1.2)
BUN SERPL-MCNC: 22 MG/DL (ref 8–23)
BUN/CREAT SERPL: 18.8 (ref 7–25)
CALCIUM SPEC-SCNC: 8 MG/DL (ref 8.6–10.5)
CHLORIDE SERPL-SCNC: 105 MMOL/L (ref 98–107)
CO2 SERPL-SCNC: 25 MMOL/L (ref 22–29)
CREAT SERPL-MCNC: 1.17 MG/DL (ref 0.76–1.27)
DEPRECATED RDW RBC AUTO: 48.2 FL (ref 37–54)
EGFRCR SERPLBLD CKD-EPI 2021: 62.6 ML/MIN/1.73
EOSINOPHIL # BLD AUTO: 0.06 10*3/MM3 (ref 0–0.4)
EOSINOPHIL NFR BLD AUTO: 1.1 % (ref 0.3–6.2)
ERYTHROCYTE [DISTWIDTH] IN BLOOD BY AUTOMATED COUNT: 14.1 % (ref 12.3–15.4)
GLOBULIN UR ELPH-MCNC: 2.8 GM/DL
GLUCOSE SERPL-MCNC: 95 MG/DL (ref 65–99)
HCT VFR BLD AUTO: 38.3 % (ref 37.5–51)
HGB BLD-MCNC: 12.6 G/DL (ref 13–17.7)
IMM GRANULOCYTES # BLD AUTO: 0.02 10*3/MM3 (ref 0–0.05)
IMM GRANULOCYTES NFR BLD AUTO: 0.4 % (ref 0–0.5)
LYMPHOCYTES # BLD AUTO: 1.02 10*3/MM3 (ref 0.7–3.1)
LYMPHOCYTES NFR BLD AUTO: 19.1 % (ref 19.6–45.3)
MCH RBC QN AUTO: 31 PG (ref 26.6–33)
MCHC RBC AUTO-ENTMCNC: 32.9 G/DL (ref 31.5–35.7)
MCV RBC AUTO: 94.1 FL (ref 79–97)
MONOCYTES # BLD AUTO: 0.38 10*3/MM3 (ref 0.1–0.9)
MONOCYTES NFR BLD AUTO: 7.1 % (ref 5–12)
NEUTROPHILS NFR BLD AUTO: 3.83 10*3/MM3 (ref 1.7–7)
NEUTROPHILS NFR BLD AUTO: 71.9 % (ref 42.7–76)
NRBC BLD AUTO-RTO: 0 /100 WBC (ref 0–0.2)
PLATELET # BLD AUTO: 145 10*3/MM3 (ref 140–450)
PMV BLD AUTO: 10.5 FL (ref 6–12)
POTASSIUM SERPL-SCNC: 4.3 MMOL/L (ref 3.5–5.2)
PROT SERPL-MCNC: 6.5 G/DL (ref 6–8.5)
PSA SERPL-MCNC: 10.8 NG/ML (ref 0–4)
RBC # BLD AUTO: 4.07 10*6/MM3 (ref 4.14–5.8)
SODIUM SERPL-SCNC: 141 MMOL/L (ref 136–145)
TESTOST SERPL-MCNC: <2.5 NG/DL (ref 193–740)
WBC NRBC COR # BLD AUTO: 5.33 10*3/MM3 (ref 3.4–10.8)

## 2025-03-31 PROCEDURE — 84403 ASSAY OF TOTAL TESTOSTERONE: CPT

## 2025-03-31 PROCEDURE — 80053 COMPREHEN METABOLIC PANEL: CPT

## 2025-03-31 PROCEDURE — 36415 COLL VENOUS BLD VENIPUNCTURE: CPT

## 2025-03-31 PROCEDURE — 84153 ASSAY OF PSA TOTAL: CPT

## 2025-03-31 PROCEDURE — 85025 COMPLETE CBC W/AUTO DIFF WBC: CPT

## 2025-04-01 DIAGNOSIS — C61 PROSTATE CANCER: Primary | ICD-10-CM

## 2025-04-14 ENCOUNTER — INFUSION (OUTPATIENT)
Dept: ONCOLOGY | Facility: HOSPITAL | Age: 80
End: 2025-04-14
Payer: OTHER GOVERNMENT

## 2025-04-14 ENCOUNTER — LAB (OUTPATIENT)
Dept: LAB | Facility: HOSPITAL | Age: 80
End: 2025-04-14
Payer: OTHER GOVERNMENT

## 2025-04-14 VITALS
BODY MASS INDEX: 26.39 KG/M2 | RESPIRATION RATE: 16 BRPM | HEART RATE: 79 BPM | DIASTOLIC BLOOD PRESSURE: 84 MMHG | SYSTOLIC BLOOD PRESSURE: 133 MMHG | OXYGEN SATURATION: 100 % | WEIGHT: 189.2 LBS | TEMPERATURE: 96 F

## 2025-04-14 DIAGNOSIS — C61 PROSTATE CANCER: Primary | ICD-10-CM

## 2025-04-14 DIAGNOSIS — C61 PROSTATE CANCER: ICD-10-CM

## 2025-04-14 LAB
ALBUMIN SERPL-MCNC: 3.8 G/DL (ref 3.5–5.2)
ALBUMIN/GLOB SERPL: 1.5 G/DL
ALP SERPL-CCNC: 65 U/L (ref 39–117)
ALT SERPL W P-5'-P-CCNC: 16 U/L (ref 1–41)
ANION GAP SERPL CALCULATED.3IONS-SCNC: 8 MMOL/L (ref 5–15)
AST SERPL-CCNC: 17 U/L (ref 1–40)
BASOPHILS # BLD AUTO: 0.02 10*3/MM3 (ref 0–0.2)
BASOPHILS NFR BLD AUTO: 0.4 % (ref 0–1.5)
BILIRUB SERPL-MCNC: 0.6 MG/DL (ref 0–1.2)
BUN SERPL-MCNC: 24 MG/DL (ref 8–23)
BUN/CREAT SERPL: 20.7 (ref 7–25)
CALCIUM SPEC-SCNC: 8.5 MG/DL (ref 8.6–10.5)
CHLORIDE SERPL-SCNC: 104 MMOL/L (ref 98–107)
CO2 SERPL-SCNC: 27 MMOL/L (ref 22–29)
CREAT SERPL-MCNC: 1.16 MG/DL (ref 0.76–1.27)
DEPRECATED RDW RBC AUTO: 49.3 FL (ref 37–54)
EGFRCR SERPLBLD CKD-EPI 2021: 63.3 ML/MIN/1.73
EOSINOPHIL # BLD AUTO: 0.04 10*3/MM3 (ref 0–0.4)
EOSINOPHIL NFR BLD AUTO: 0.9 % (ref 0.3–6.2)
ERYTHROCYTE [DISTWIDTH] IN BLOOD BY AUTOMATED COUNT: 14.6 % (ref 12.3–15.4)
GLOBULIN UR ELPH-MCNC: 2.6 GM/DL
GLUCOSE SERPL-MCNC: 145 MG/DL (ref 65–99)
HCT VFR BLD AUTO: 39.9 % (ref 37.5–51)
HGB BLD-MCNC: 12.8 G/DL (ref 13–17.7)
IMM GRANULOCYTES # BLD AUTO: 0.01 10*3/MM3 (ref 0–0.05)
IMM GRANULOCYTES NFR BLD AUTO: 0.2 % (ref 0–0.5)
LYMPHOCYTES # BLD AUTO: 1.11 10*3/MM3 (ref 0.7–3.1)
LYMPHOCYTES NFR BLD AUTO: 23.6 % (ref 19.6–45.3)
MAGNESIUM SERPL-MCNC: 1.8 MG/DL (ref 1.6–2.4)
MCH RBC QN AUTO: 30.2 PG (ref 26.6–33)
MCHC RBC AUTO-ENTMCNC: 32.1 G/DL (ref 31.5–35.7)
MCV RBC AUTO: 94.1 FL (ref 79–97)
MONOCYTES # BLD AUTO: 0.2 10*3/MM3 (ref 0.1–0.9)
MONOCYTES NFR BLD AUTO: 4.3 % (ref 5–12)
NEUTROPHILS NFR BLD AUTO: 3.32 10*3/MM3 (ref 1.7–7)
NEUTROPHILS NFR BLD AUTO: 70.6 % (ref 42.7–76)
NRBC BLD AUTO-RTO: 0 /100 WBC (ref 0–0.2)
PHOSPHATE SERPL-MCNC: 2.8 MG/DL (ref 2.5–4.5)
PLATELET # BLD AUTO: 143 10*3/MM3 (ref 140–450)
PMV BLD AUTO: 10 FL (ref 6–12)
POTASSIUM SERPL-SCNC: 4.5 MMOL/L (ref 3.5–5.2)
PROT SERPL-MCNC: 6.4 G/DL (ref 6–8.5)
RBC # BLD AUTO: 4.24 10*6/MM3 (ref 4.14–5.8)
SODIUM SERPL-SCNC: 139 MMOL/L (ref 136–145)
WBC NRBC COR # BLD AUTO: 4.7 10*3/MM3 (ref 3.4–10.8)

## 2025-04-14 PROCEDURE — 96372 THER/PROPH/DIAG INJ SC/IM: CPT

## 2025-04-14 PROCEDURE — 84100 ASSAY OF PHOSPHORUS: CPT

## 2025-04-14 PROCEDURE — 85025 COMPLETE CBC W/AUTO DIFF WBC: CPT

## 2025-04-14 PROCEDURE — 83735 ASSAY OF MAGNESIUM: CPT

## 2025-04-14 PROCEDURE — 25010000002 DENOSUMAB 120 MG/1.7ML SOLUTION: Performed by: INTERNAL MEDICINE

## 2025-04-14 PROCEDURE — 36415 COLL VENOUS BLD VENIPUNCTURE: CPT

## 2025-04-14 PROCEDURE — 80053 COMPREHEN METABOLIC PANEL: CPT

## 2025-04-14 RX ADMIN — DENOSUMAB 120 MG: 120 INJECTION SUBCUTANEOUS at 13:18

## 2025-04-21 ENCOUNTER — PROCEDURE VISIT (OUTPATIENT)
Dept: UROLOGY | Facility: CLINIC | Age: 80
End: 2025-04-21
Payer: MEDICARE

## 2025-04-21 DIAGNOSIS — R33.9 URINARY RETENTION: Primary | ICD-10-CM

## 2025-04-21 NOTE — PROGRESS NOTES
Francisco Sinclair is here today for catheter change.  Patient is seen by Dr. Pat.  Using a 10 cc syringe the balloon was deflated and the old catheter was removed without difficulty. Using sterile technique the patient was prepped with iodine and new 20F coude catheter was placed. 10 cc of sterile water used to inflated the balloon and catheter was then attached to a leg bag. Patient tolerated the procedure without pain or difficulty.  Arjun Wilson PA-C was in the office at the time of procedure. The patient was advised to return in 1 month for next catheter change. Patient verbalized understanding. KAY Douglass RN.     I have reviewed and agree with medical assistance documentation above

## 2025-05-06 DIAGNOSIS — C61 PROSTATE CANCER: Primary | ICD-10-CM

## 2025-05-12 ENCOUNTER — LAB (OUTPATIENT)
Dept: LAB | Facility: HOSPITAL | Age: 80
End: 2025-05-12
Payer: MEDICARE

## 2025-05-12 ENCOUNTER — INFUSION (OUTPATIENT)
Dept: ONCOLOGY | Facility: HOSPITAL | Age: 80
End: 2025-05-12
Payer: MEDICARE

## 2025-05-12 VITALS
RESPIRATION RATE: 18 BRPM | TEMPERATURE: 96.4 F | SYSTOLIC BLOOD PRESSURE: 128 MMHG | HEIGHT: 71 IN | DIASTOLIC BLOOD PRESSURE: 57 MMHG | WEIGHT: 200 LBS | HEART RATE: 72 BPM | BODY MASS INDEX: 28 KG/M2 | OXYGEN SATURATION: 100 %

## 2025-05-12 DIAGNOSIS — C61 PROSTATE CANCER: Primary | ICD-10-CM

## 2025-05-12 DIAGNOSIS — C61 PROSTATE CANCER: ICD-10-CM

## 2025-05-12 LAB
ALBUMIN SERPL-MCNC: 3.9 G/DL (ref 3.5–5.2)
ALBUMIN/GLOB SERPL: 1.8 G/DL
ALP SERPL-CCNC: 53 U/L (ref 39–117)
ALT SERPL W P-5'-P-CCNC: 16 U/L (ref 1–41)
ANION GAP SERPL CALCULATED.3IONS-SCNC: 7 MMOL/L (ref 5–15)
AST SERPL-CCNC: 16 U/L (ref 1–40)
BASOPHILS # BLD AUTO: 0.01 10*3/MM3 (ref 0–0.2)
BASOPHILS NFR BLD AUTO: 0.2 % (ref 0–1.5)
BILIRUB SERPL-MCNC: 0.5 MG/DL (ref 0–1.2)
BUN SERPL-MCNC: 25 MG/DL (ref 8–23)
BUN/CREAT SERPL: 23.4 (ref 7–25)
CALCIUM SPEC-SCNC: 8.4 MG/DL (ref 8.6–10.5)
CHLORIDE SERPL-SCNC: 106 MMOL/L (ref 98–107)
CO2 SERPL-SCNC: 27 MMOL/L (ref 22–29)
CREAT SERPL-MCNC: 1.07 MG/DL (ref 0.76–1.27)
DEPRECATED RDW RBC AUTO: 52.6 FL (ref 37–54)
EGFRCR SERPLBLD CKD-EPI 2021: 69.7 ML/MIN/1.73
EOSINOPHIL # BLD AUTO: 0.04 10*3/MM3 (ref 0–0.4)
EOSINOPHIL NFR BLD AUTO: 0.8 % (ref 0.3–6.2)
ERYTHROCYTE [DISTWIDTH] IN BLOOD BY AUTOMATED COUNT: 14.6 % (ref 12.3–15.4)
GLOBULIN UR ELPH-MCNC: 2.2 GM/DL
GLUCOSE SERPL-MCNC: 98 MG/DL (ref 65–99)
HCT VFR BLD AUTO: 38.5 % (ref 37.5–51)
HGB BLD-MCNC: 12.4 G/DL (ref 13–17.7)
IMM GRANULOCYTES # BLD AUTO: 0.02 10*3/MM3 (ref 0–0.05)
IMM GRANULOCYTES NFR BLD AUTO: 0.4 % (ref 0–0.5)
LYMPHOCYTES # BLD AUTO: 1.18 10*3/MM3 (ref 0.7–3.1)
LYMPHOCYTES NFR BLD AUTO: 22.5 % (ref 19.6–45.3)
MAGNESIUM SERPL-MCNC: 1.7 MG/DL (ref 1.6–2.4)
MCH RBC QN AUTO: 31.2 PG (ref 26.6–33)
MCHC RBC AUTO-ENTMCNC: 32.2 G/DL (ref 31.5–35.7)
MCV RBC AUTO: 97 FL (ref 79–97)
MONOCYTES # BLD AUTO: 0.31 10*3/MM3 (ref 0.1–0.9)
MONOCYTES NFR BLD AUTO: 5.9 % (ref 5–12)
NEUTROPHILS NFR BLD AUTO: 3.68 10*3/MM3 (ref 1.7–7)
NEUTROPHILS NFR BLD AUTO: 70.2 % (ref 42.7–76)
PHOSPHATE SERPL-MCNC: 3.4 MG/DL (ref 2.5–4.5)
PLATELET # BLD AUTO: 120 10*3/MM3 (ref 140–450)
PMV BLD AUTO: 10 FL (ref 6–12)
POTASSIUM SERPL-SCNC: 4.2 MMOL/L (ref 3.5–5.2)
PROT SERPL-MCNC: 6.1 G/DL (ref 6–8.5)
RBC # BLD AUTO: 3.97 10*6/MM3 (ref 4.14–5.8)
SODIUM SERPL-SCNC: 140 MMOL/L (ref 136–145)
WBC NRBC COR # BLD AUTO: 5.24 10*3/MM3 (ref 3.4–10.8)

## 2025-05-12 PROCEDURE — 84100 ASSAY OF PHOSPHORUS: CPT

## 2025-05-12 PROCEDURE — 83735 ASSAY OF MAGNESIUM: CPT

## 2025-05-12 PROCEDURE — 80053 COMPREHEN METABOLIC PANEL: CPT

## 2025-05-12 PROCEDURE — G0463 HOSPITAL OUTPT CLINIC VISIT: HCPCS

## 2025-05-12 PROCEDURE — 36415 COLL VENOUS BLD VENIPUNCTURE: CPT

## 2025-05-12 PROCEDURE — 85025 COMPLETE CBC W/AUTO DIFF WBC: CPT

## 2025-05-19 ENCOUNTER — PROCEDURE VISIT (OUTPATIENT)
Dept: UROLOGY | Facility: CLINIC | Age: 80
End: 2025-05-19
Payer: MEDICARE

## 2025-05-19 ENCOUNTER — INFUSION (OUTPATIENT)
Dept: ONCOLOGY | Facility: HOSPITAL | Age: 80
End: 2025-05-19
Payer: MEDICARE

## 2025-05-19 ENCOUNTER — LAB (OUTPATIENT)
Dept: LAB | Facility: HOSPITAL | Age: 80
End: 2025-05-19
Payer: MEDICARE

## 2025-05-19 VITALS
HEART RATE: 67 BPM | DIASTOLIC BLOOD PRESSURE: 65 MMHG | HEIGHT: 71 IN | RESPIRATION RATE: 16 BRPM | BODY MASS INDEX: 27.44 KG/M2 | OXYGEN SATURATION: 100 % | TEMPERATURE: 96.6 F | SYSTOLIC BLOOD PRESSURE: 148 MMHG | WEIGHT: 196 LBS

## 2025-05-19 VITALS — DIASTOLIC BLOOD PRESSURE: 80 MMHG | HEART RATE: 90 BPM | OXYGEN SATURATION: 97 % | SYSTOLIC BLOOD PRESSURE: 146 MMHG

## 2025-05-19 DIAGNOSIS — R33.9 URINARY RETENTION: Primary | ICD-10-CM

## 2025-05-19 DIAGNOSIS — C61 PROSTATE CANCER: Primary | ICD-10-CM

## 2025-05-19 DIAGNOSIS — C61 PROSTATE CANCER: ICD-10-CM

## 2025-05-19 LAB
ALBUMIN SERPL-MCNC: 3.8 G/DL (ref 3.5–5.2)
ALBUMIN/GLOB SERPL: 1.6 G/DL
ALP SERPL-CCNC: 58 U/L (ref 39–117)
ALT SERPL W P-5'-P-CCNC: 16 U/L (ref 1–41)
ANION GAP SERPL CALCULATED.3IONS-SCNC: 8 MMOL/L (ref 5–15)
AST SERPL-CCNC: 19 U/L (ref 1–40)
BASOPHILS # BLD AUTO: 0.01 10*3/MM3 (ref 0–0.2)
BASOPHILS NFR BLD AUTO: 0.2 % (ref 0–1.5)
BILIRUB SERPL-MCNC: 0.6 MG/DL (ref 0–1.2)
BUN SERPL-MCNC: 24 MG/DL (ref 8–23)
BUN/CREAT SERPL: 21.1 (ref 7–25)
CALCIUM SPEC-SCNC: 8.6 MG/DL (ref 8.6–10.5)
CHLORIDE SERPL-SCNC: 104 MMOL/L (ref 98–107)
CO2 SERPL-SCNC: 28 MMOL/L (ref 22–29)
CREAT SERPL-MCNC: 1.14 MG/DL (ref 0.76–1.27)
DEPRECATED RDW RBC AUTO: 51.5 FL (ref 37–54)
EGFRCR SERPLBLD CKD-EPI 2021: 64.6 ML/MIN/1.73
EOSINOPHIL # BLD AUTO: 0.03 10*3/MM3 (ref 0–0.4)
EOSINOPHIL NFR BLD AUTO: 0.6 % (ref 0.3–6.2)
ERYTHROCYTE [DISTWIDTH] IN BLOOD BY AUTOMATED COUNT: 14.6 % (ref 12.3–15.4)
GLOBULIN UR ELPH-MCNC: 2.4 GM/DL
GLUCOSE SERPL-MCNC: 80 MG/DL (ref 65–99)
HCT VFR BLD AUTO: 40.7 % (ref 37.5–51)
HGB BLD-MCNC: 13.2 G/DL (ref 13–17.7)
IMM GRANULOCYTES # BLD AUTO: 0.03 10*3/MM3 (ref 0–0.05)
IMM GRANULOCYTES NFR BLD AUTO: 0.6 % (ref 0–0.5)
LYMPHOCYTES # BLD AUTO: 0.89 10*3/MM3 (ref 0.7–3.1)
LYMPHOCYTES NFR BLD AUTO: 16.7 % (ref 19.6–45.3)
MCH RBC QN AUTO: 31.3 PG (ref 26.6–33)
MCHC RBC AUTO-ENTMCNC: 32.4 G/DL (ref 31.5–35.7)
MCV RBC AUTO: 96.4 FL (ref 79–97)
MONOCYTES # BLD AUTO: 0.41 10*3/MM3 (ref 0.1–0.9)
MONOCYTES NFR BLD AUTO: 7.7 % (ref 5–12)
NEUTROPHILS NFR BLD AUTO: 3.95 10*3/MM3 (ref 1.7–7)
NEUTROPHILS NFR BLD AUTO: 74.2 % (ref 42.7–76)
PLATELET # BLD AUTO: 124 10*3/MM3 (ref 140–450)
PMV BLD AUTO: 9.9 FL (ref 6–12)
POTASSIUM SERPL-SCNC: 4.5 MMOL/L (ref 3.5–5.2)
PROT SERPL-MCNC: 6.2 G/DL (ref 6–8.5)
PSA SERPL-MCNC: 7.63 NG/ML (ref 0–4)
RBC # BLD AUTO: 4.22 10*6/MM3 (ref 4.14–5.8)
SODIUM SERPL-SCNC: 140 MMOL/L (ref 136–145)
TESTOST SERPL-MCNC: <2.5 NG/DL (ref 193–740)
WBC NRBC COR # BLD AUTO: 5.32 10*3/MM3 (ref 3.4–10.8)

## 2025-05-19 PROCEDURE — 85025 COMPLETE CBC W/AUTO DIFF WBC: CPT

## 2025-05-19 PROCEDURE — 80053 COMPREHEN METABOLIC PANEL: CPT

## 2025-05-19 PROCEDURE — 36415 COLL VENOUS BLD VENIPUNCTURE: CPT

## 2025-05-19 PROCEDURE — 84153 ASSAY OF PSA TOTAL: CPT

## 2025-05-19 PROCEDURE — 25010000002 DENOSUMAB 120 MG/1.7ML SOLUTION: Performed by: INTERNAL MEDICINE

## 2025-05-19 PROCEDURE — 96372 THER/PROPH/DIAG INJ SC/IM: CPT

## 2025-05-19 PROCEDURE — 84403 ASSAY OF TOTAL TESTOSTERONE: CPT

## 2025-05-19 RX ADMIN — DENOSUMAB 120 MG: 120 INJECTION SUBCUTANEOUS at 10:24

## 2025-05-19 NOTE — PROGRESS NOTES
Francisco Sinclair is here today for catheter change.  Patient is seen by Dr. Pat.  The old catheter was removed without difficulty. Using sterile technique the patient was prepped with iodine and new 20 fr. coude catheter was placed. 10 cc of sterile water used to inflated the balloon and catheter was then attached to a leg bag. Patient tolerated the procedure well.  Angella RAMOS was in the office at the time of procedure. The patient was advised to return in 1 month for f/u with Dr. Pat and next catheter change. Patient verbalized understanding. SUJEY Troy RN     I have reviewed and agree with medical assistance documentation above

## 2025-05-20 ENCOUNTER — RESULTS FOLLOW-UP (OUTPATIENT)
Dept: LAB | Facility: HOSPITAL | Age: 80
End: 2025-05-20
Payer: MEDICARE

## 2025-05-20 ENCOUNTER — SPECIALTY PHARMACY (OUTPATIENT)
Dept: ONCOLOGY | Facility: HOSPITAL | Age: 80
End: 2025-05-20
Payer: MEDICARE

## 2025-05-20 DIAGNOSIS — C61 PROSTATE CANCER: Primary | ICD-10-CM

## 2025-05-20 RX ORDER — PREDNISONE 5 MG/1
5 TABLET ORAL 2 TIMES DAILY
Qty: 60 TABLET | Refills: 11 | Status: SHIPPED | OUTPATIENT
Start: 2025-05-20

## 2025-05-20 RX ORDER — ABIRATERONE ACETATE 250 MG/1
1000 TABLET ORAL DAILY
Qty: 120 TABLET | Refills: 11 | Status: SHIPPED | OUTPATIENT
Start: 2025-05-20

## 2025-05-20 NOTE — PROGRESS NOTES
Specialty Pharmacy Patient Management Program  Hematology & Oncology 6-Month Clinical Assessment       Francisco Sinclair is a 79 y.o. male with Prostate Cancer seen today to assess adherence and side effects.    Consulting Provider: Josh Santa MD ([facility])  Oral Chemotherapy Regimen: abiraterone [Zytiga] 1000 mg PO daily + prednisone 5 mg PO twice daily  Specialty Pharmacy:  [facility] - [address] [facility] [phone] [facility] [phone] FX      Reason for Outreach: Routine medication check-in .    Oncology/Hematology History Overview Note    Oncology/Hematology History   Prostate cancer   2/10/2025 Initial Diagnosis    Prostate cancer     2/10/2025 -  Chemotherapy    OP PROSTATE ZYTIGA     3/17/2025 -  Chemotherapy    OP SUPPORTIVE Denosumab (Xgeva) Q28D         Problem List   Problem list reviewed by Bethany Louise, PharmD on 5/20/2025 at  5:08 PM  Medicines reviewed by Bethany Louise PharmD on 5/20/2025 at  5:07 PM  Allergies reviewed by Bethany Louise, PharmD on 5/20/2025 at  5:07 PM  Patient Active Problem List   Diagnosis Code    Hx of colonic polyps Z86.0100    Black stools K92.1    Abnormal MRI, pelvis R93.5    Elevated prostate specific antigen (PSA) R97.20    Prostate cancer C61       Specialty Pharmacy Goal:   Goals Addressed Today        Specialty Pharmacy General Goal      Achieve progression free disease as evidenced by provider markers (ie. undetectable PSA WNL).    05/20/25: Most recent scans/labs/provider notes detail no evidence of disease progression - will continue Zytiga + prednisone. On track.              Medication Regimen Assessment:  Administration: Patient is taking every day at the same time  and as prescribed .   Patient can self administer medications: yes  Medication Follow-Up Plan: Refill coordination    Lab(s) Review:  The labs listed below have been reviewed. No dose adjustments are needed for the oral specialty  medication(s) based on the labs.    Lab Results   Component Value Date    GLUCOSE 80 05/19/2025    CALCIUM 8.6 05/19/2025     05/19/2025    K 4.5 05/19/2025    CO2 28.0 05/19/2025     05/19/2025    BUN 24 (H) 05/19/2025    CREATININE 1.14 05/19/2025    EGFRIFAFRI >59 03/08/2021    EGFRIFNONA 49 (A) 03/08/2021    BCR 21.1 05/19/2025    ANIONGAP 8.0 05/19/2025     Lab Results   Component Value Date    WBC 5.32 05/19/2025    RBC 4.22 05/19/2025    HGB 13.2 05/19/2025    HCT 40.7 05/19/2025    MCV 96.4 05/19/2025    MCH 31.3 05/19/2025    MCHC 32.4 05/19/2025    RDW 14.6 05/19/2025    RDWSD 51.5 05/19/2025    MPV 9.9 05/19/2025     (L) 05/19/2025    NEUTRORELPCT 74.2 05/19/2025    LYMPHORELPCT 16.7 (L) 05/19/2025    MONORELPCT 7.7 05/19/2025    EOSRELPCT 0.6 05/19/2025    BASORELPCT 0.2 05/19/2025    AUTOIGPER 0.6 (H) 05/19/2025    NEUTROABS 3.95 05/19/2025    LYMPHSABS 0.89 05/19/2025    MONOSABS 0.41 05/19/2025    EOSABS 0.03 05/19/2025    BASOSABS 0.01 05/19/2025    AUTOIGNUM 0.03 05/19/2025    NRBC 0.0 04/14/2025       Drug-Drug Interaction(s) Assessment:  Completed medication reconciliation today to assess for drug interactions. Patient denies starting or stopping any medications.    Assessed medication list for interactions, no significant drug interactions noted.   Advised patient to call the clinic if any new medications are started so we can assess for drug-drug interactions.  Drug-food interactions discussed: eating grapefruit and drinking grapefruit juice and eating [place] oranges (commonly found in orange marmalade)  Vaccines are coordinated by the patient's oncologist and primary care provider.    Medication(s) Review:   Medicines reviewed by Bethany Louise, PharmD on 5/20/2025 at  5:07 PM  Prior to Admission medications    Medication Sig Start Date End Date Taking? Authorizing Provider   abiraterone acetate (ZYTIGA) 250 MG tablet Take 4 tablets by mouth Daily. Take on empty stomach;  either 1 hour before or 2 hours after a meal. 3/17/25   Josh Santa MD   amLODIPine (NORVASC) 2.5 MG tablet Take 1 tablet by mouth Daily.    Alfonso Godfrey MD   Aspirin 81 MG capsule Take 81 mg by mouth Daily.    Alfonso Godfrey MD   atorvastatin (LIPITOR) 80 MG tablet Take 0.5 tablets by mouth Every Night.    Alfonso Godfrey MD   carvedilol (COREG) 12.5 MG tablet Take 0.5 tablets by mouth 2 (Two) Times a Day With Meals.    Alfonso Godfrey MD   Coenzyme Q10 (Co Q 10) 100 MG capsule Take 100 mg by mouth Daily.    Alfonso Godfrey MD   dipyridamole (PERSANTINE) 75 MG tablet Take 1 tablet by mouth 3 (Three) Times a Day.    Alfonso Godfrey MD   Flomax 0.4 MG capsule 24 hr capsule Take 1 capsule by mouth Daily. 10/15/24   Alfonso Godfrey MD   HYDROcodone-acetaminophen (NORCO) 5-325 MG per tablet Take 1 tablet by mouth Every 4 (Four) Hours As Needed for Moderate Pain. 1/9/25   Abdulaziz Simms Jr., MD   ketorolac (TORADOL) 10 MG tablet Take 1 tablet by mouth Every 6 (Six) Hours As Needed for Moderate Pain (post op pain). 1/15/25   Sabrina Vyas APRN   lisinopril (PRINIVIL,ZESTRIL) 20 MG tablet Take 0.5 tablets by mouth Daily.    Alfonso Godfrey MD   multivitamin with minerals (Multivitamin Adults) tablet tablet Take 1 tablet by mouth Daily. Centrum Silver Men 50+ Multivitamin    Alfonso Godfrey MD   Omega-3 Fatty Acids (FISH OIL) 1000 MG capsule capsule Take 1 capsule by mouth Daily With Breakfast.    Alfonso Godfrey MD   omeprazole (priLOSEC) 20 MG capsule Take 1 capsule by mouth Daily.    Alfonso Godfrey MD   predniSONE (DELTASONE) 5 MG tablet Take 1 tablet by mouth 2 (Two) Times a Day. Start taking medication, the same day as abiraterone [Zytiga]. 3/10/25   Josh Santa MD   predniSONE (DELTASONE) 5 MG tablet Take 1 tablet by mouth 2 (Two) Times a Day. Start taking medication, the same day as abiraterone [Zytiga]. 2/11/25 5/20/25   Josh Santa MD   ranolazine (RANEXA) 1000 MG 12 hr tablet Take 1 tablet by mouth 2 (Two) Times a Day.  Patient not taking: Reported on 3/31/2025  5/20/25  Provider, MD Alfonso       Allergy Review:  Known allergies and reactions were discussed with the patient. The patient's chart has been reviewed for allergy information and updated as necessary.   Allergies   Allergen Reactions    Rosuvastatin Myalgia     Muscle spasms         Allergies reviewed by Bethany Louise, PharmD on 5/20/2025 at  5:07 PM    Hospitalizations and Urgent Care Visits Since Last Visit Assessment:  Unplanned hospitalizations or inpatient admissions: none reported  ED Visits: none reported  Urgent Office Visits: none reported    Adherence Assessment:  No adherence issues noted during 6-month clinical discussion.    Quality of Life Assessment:  Quality of Life: 8/10    Financial Assessment:  Medication availability/affordability: Patient has had no issues obtaining medication from pharmacy.    Intervention(s):  Sent in a 12 month prescription for prednisone + Zytiga to [facility] pharmacy - requested by patient.    Follow-Up(s):  No needs expressed by the patient or otherwise identified. Will follow-up in ~2 month(s) regarding the patient's oral chemotherapy with a routine telephone consultation. Will conduct the next in-person clinical assessment visit within the next 6 month(s).    Attestation:  I attest the patient was actively involved in and has agreed to the above plan of care. If the prescribed therapy is at any point deemed not appropriate based on the current or future assessments, a consultation will be initiated with the patient's specialty care provider to determine the best course of action. The revised plan of therapy will be documented along with any required assessments and/or additional patient education provided.     Finally, all questions and concerns today were addressed to the best of my knowledge within the  6-month clinical assessment office visit. Patient verbalized they had the Specialty Pharmacy Services contact information for any future concerns or questions.         Electronically signed 05/20/2025 17:08 CDT by:  Bethany Louise PharmD  Hematology & Oncology Clinic Specialty Pharmacist  [facility]  Phone: [phone]

## 2025-05-20 NOTE — TELEPHONE ENCOUNTER
----- Message from Josh Santa sent at 5/20/2025  3:56 PM CDT -----  Conctinue abiraterone+prednisone  ----- Message -----  From: Lab, Background User  Sent: 5/19/2025   9:54 AM CDT  To: Josh Santa MD

## 2025-06-09 ENCOUNTER — APPOINTMENT (OUTPATIENT)
Dept: LAB | Facility: HOSPITAL | Age: 80
End: 2025-06-09
Payer: OTHER GOVERNMENT

## 2025-06-09 ENCOUNTER — LAB (OUTPATIENT)
Dept: LAB | Facility: HOSPITAL | Age: 80
End: 2025-06-09
Payer: OTHER GOVERNMENT

## 2025-06-09 DIAGNOSIS — C61 PROSTATE CANCER: Primary | ICD-10-CM

## 2025-06-09 DIAGNOSIS — R94.6 ABNORMAL RESULTS OF THYROID FUNCTION STUDIES: ICD-10-CM

## 2025-06-09 LAB
ALBUMIN SERPL-MCNC: 3.8 G/DL (ref 3.5–5.2)
ALBUMIN/GLOB SERPL: 1.8 G/DL
ALP SERPL-CCNC: 48 U/L (ref 39–117)
ALT SERPL W P-5'-P-CCNC: 20 U/L (ref 1–41)
ANION GAP SERPL CALCULATED.3IONS-SCNC: 11 MMOL/L (ref 5–15)
AST SERPL-CCNC: 21 U/L (ref 1–40)
BASOPHILS # BLD AUTO: 0.02 10*3/MM3 (ref 0–0.2)
BASOPHILS NFR BLD AUTO: 0.4 % (ref 0–1.5)
BILIRUB SERPL-MCNC: 0.6 MG/DL (ref 0–1.2)
BUN SERPL-MCNC: 27.7 MG/DL (ref 8–23)
BUN/CREAT SERPL: 23.9 (ref 7–25)
CALCIUM SPEC-SCNC: 8.7 MG/DL (ref 8.6–10.5)
CHLORIDE SERPL-SCNC: 107 MMOL/L (ref 98–107)
CO2 SERPL-SCNC: 24 MMOL/L (ref 22–29)
CREAT SERPL-MCNC: 1.16 MG/DL (ref 0.76–1.27)
DEPRECATED RDW RBC AUTO: 50.1 FL (ref 37–54)
EGFRCR SERPLBLD CKD-EPI 2021: 63.3 ML/MIN/1.73
EOSINOPHIL # BLD AUTO: 0.05 10*3/MM3 (ref 0–0.4)
EOSINOPHIL NFR BLD AUTO: 1 % (ref 0.3–6.2)
ERYTHROCYTE [DISTWIDTH] IN BLOOD BY AUTOMATED COUNT: 14.3 % (ref 12.3–15.4)
GLOBULIN UR ELPH-MCNC: 2.1 GM/DL
GLUCOSE SERPL-MCNC: 98 MG/DL (ref 65–99)
HCT VFR BLD AUTO: 38.4 % (ref 37.5–51)
HGB BLD-MCNC: 12.7 G/DL (ref 13–17.7)
IMM GRANULOCYTES # BLD AUTO: 0.02 10*3/MM3 (ref 0–0.05)
IMM GRANULOCYTES NFR BLD AUTO: 0.4 % (ref 0–0.5)
LYMPHOCYTES # BLD AUTO: 1.29 10*3/MM3 (ref 0.7–3.1)
LYMPHOCYTES NFR BLD AUTO: 25 % (ref 19.6–45.3)
MCH RBC QN AUTO: 31.8 PG (ref 26.6–33)
MCHC RBC AUTO-ENTMCNC: 33.1 G/DL (ref 31.5–35.7)
MCV RBC AUTO: 96.2 FL (ref 79–97)
MONOCYTES # BLD AUTO: 0.26 10*3/MM3 (ref 0.1–0.9)
MONOCYTES NFR BLD AUTO: 5 % (ref 5–12)
NEUTROPHILS NFR BLD AUTO: 3.51 10*3/MM3 (ref 1.7–7)
NEUTROPHILS NFR BLD AUTO: 68.2 % (ref 42.7–76)
PLATELET # BLD AUTO: 124 10*3/MM3 (ref 140–450)
PMV BLD AUTO: 10.4 FL (ref 6–12)
POTASSIUM SERPL-SCNC: 4.5 MMOL/L (ref 3.5–5.2)
PROT SERPL-MCNC: 5.9 G/DL (ref 6–8.5)
RBC # BLD AUTO: 3.99 10*6/MM3 (ref 4.14–5.8)
SODIUM SERPL-SCNC: 142 MMOL/L (ref 136–145)
TSH SERPL DL<=0.05 MIU/L-ACNC: 0.7 UIU/ML (ref 0.27–4.2)
WBC NRBC COR # BLD AUTO: 5.15 10*3/MM3 (ref 3.4–10.8)

## 2025-06-09 PROCEDURE — 84402 ASSAY OF FREE TESTOSTERONE: CPT | Performed by: INTERNAL MEDICINE

## 2025-06-09 PROCEDURE — 80053 COMPREHEN METABOLIC PANEL: CPT | Performed by: INTERNAL MEDICINE

## 2025-06-09 PROCEDURE — 84443 ASSAY THYROID STIM HORMONE: CPT | Performed by: INTERNAL MEDICINE

## 2025-06-09 PROCEDURE — 84403 ASSAY OF TOTAL TESTOSTERONE: CPT | Performed by: INTERNAL MEDICINE

## 2025-06-09 PROCEDURE — 36415 COLL VENOUS BLD VENIPUNCTURE: CPT | Performed by: INTERNAL MEDICINE

## 2025-06-09 PROCEDURE — 85025 COMPLETE CBC W/AUTO DIFF WBC: CPT | Performed by: INTERNAL MEDICINE

## 2025-06-10 DIAGNOSIS — C61 PROSTATE CANCER: Primary | ICD-10-CM

## 2025-06-12 LAB
TESTOST FREE SERPL-MCNC: <0.2 PG/ML (ref 6.6–18.1)
TESTOST SERPL-MCNC: <3 NG/DL (ref 264–916)

## 2025-06-15 NOTE — PROGRESS NOTES
"[facility] HEMATOLOGY & ONCOLOGY  [address]  Formerly [facility] [address] [phone]    Patient Name: Francisco Sinclair  Encounter Date: 03/31/2025  YOB: 1945  Patient Number: [id number]    REASON FOR VISIT:   Francisco \"García\" Yahir is an 79 yoM who returns in follow-up of S prostate adenocarcinoma -original tumor stage:  IV- [place] 4+ 5 = 13/13 cores.  On 2/10/2025- The patient was seen for management considerations. Started: Prednisone 5 mg p.o. twice daily+ abiraterone 1000 mg p.o. daily (4.5 mo). On 2/17/2025-Lupron 7.5 mg IM. On 3/17/2025-Lupron 45 mg IM.  Here alone (previously accompanied by his daughter-in-law, Angus Sinclair)     I have reviewed the HPI and verified with the patient the accuracy of it. No changes to interval history since the information was documented. Josh Santa MD 06/23/25      Diagnostic abnormalities:  - CAD, prior CABG, hyperlipidemia, GERD    -  Due to grossly abnormal digital rectal exam and PSA >7:  - 1/2/2025- MRI fusion biopsy-Bright 4+ 5 = adenocarcinoma the prostate 13/13 cores:    Final diagnosis- 1. Prostate, right mid base, needle biopsy:  - Adenocarcinoma with mixed micropapillary ductal and acinar components, [place] grade 4+3 = 7 (Grade Group 3).  -[place] myxoid stromal changes with hemosiderin-laden macrophages.  - Tumor discontinuously involves approximately 50% of the total linear length of the submitted tissue.  Comment: Immunohistochemical stain for p63 confirms the absence of a basal cell lining.  2. Prostate, right lateral base, needle biopsy:  - Adenocarcinoma, acinar type, [place] grade 4+3 = 7 (Grade Group 3).  -[place] myxoid stromal changes and focal xanthogranulomata.  - Tumor discontinuously involves approximately 90% of the linear length of the submitted tissue.  Comment: Immunohistochemical stain for p63 confirms the absence of a basal cell lining in the areas of concern.  3. " Prostate, right mid medial, needle biopsy:  - Adenocarcinoma, acinar type, [place] grade 4+3 = 7 (Grade Group 3).  - [place] myxoid stromal changes and hemosiderin-laden macrophages.  - Tumor discontinuously involves approximately 45% of the linear length of the submitted tissue.  4. Prostate, right mid lateral, needle biopsy:  - Adenocarcinoma, acinar type, [place] grade 3+4 = 7 (Grade Group 2).  - Tumor discontinuously involves approximately 30% of the linear length of th  5. Prostate, right mid apex, needle biopsy:  - Adenocarcinoma, acinar type, [place] grade 4+5 = 9 (Grade Group 5).  - Focal xanthogranuloma.  - Tumor discontinuously involves approximately 70% of the total linear length of the submitted tissue.  6. Prostate, right lateral apex, needle biopsy:  - Adenocarcinoma, acinar type, [place] grade 4+5 = 9 (Grade Group 5).  - [place] stromal myxoid changes.  - Tumor discontinuously involves approximately 40% of the linear length of the submitted tissue.  7. Prostate, left mid base, needle biopsy:  - Adenocarcinoma with mixed acinar and ductal components, Bright grade 3+4 = 7 (Grade Group 2).  - Tumor involves approximately 70% of the linear length of the submitted tissue.  8. Prostate, left lateral base, needle biopsy:  - Adenocarcinoma, acinar type, [place] grade 4+4 = 8 (Grade Group 4).  - Tumor involves approximately 15% of the linear length of the submitted tissue.  9. Prostate, left mid medial, needle biopsy:  - Adenocarcinoma, ductal type, [place] grade 4+4 = 8 (Grade Group 4).  - Tumor involves approximately 10% of the submitted tissue.  10. Prostate, right mid lateral, needle biopsy:  - Adenocarcinoma, acinar type, Bright grade 4+3 = 7 (Grade Group 3).  - Tumor discontinuously involves approximately 60% of the linear length of the submitted tissue.  11. Prostate, left mid apex, needle biopsy:  - Adenocarcinoma with mixed acinar and ductal components, Bright grade 3+4 = 7 (Grade Group 2).  - [place]  myxoid stromal changes.  - Tumor discontinuously involves approximately 50% of the linear length of the submitted tissue.  12. Prostate, left lateral apex, needle biopsy:  - Adenocarcinoma, acinar type, [place] grade 4+4 = 8 (Grade Group 4).  - [place] myxoid stromal changes.  - Tumor discontinuously involves approximately 80% of the linear length of the submitted tissue.  13. Prostate, lesion 1, targets 1 through 4, needle biopsies:  - Adenocarcinoma with mixed acinar and ductal components, Bright grade 4+3 = 7 (Grade Group 3).  - [place] myxoid stromal changes and hemosiderin-laden macrophages.  - Tumor discontinuously involves approximately 50% of the total linear length of the submitted tissue.  AJCC stage: pTX pNX    - 1/9/2025- CT abdomen/pelvis- A poorly defined mass between the enlarged prostate and the rectum which shows ill-defined septated low density areas was not noted in the previous study and may represent a mass from the prostate involving the rectum. There is complete loss of fat plane between the prostate and the rectum. Moderate diffuse thickening of the wall of the mid and distal rectum may be due to inflammatory/infectious process or involvement by the prostatic neoplasm. Details given above.Evolving metastatic lesions of the liver which have increased in size since the previous study in October 2024. There is significant dilatation of the urinary bladder which reaches into the abdomen to the level of the umbilicus. This is probably due to outlet obstruction from the enlarged prostate. There is evidence of bilateral hydronephrosis and hydroureter probably due to vesicoureteral  reflux. This was not noted in the previous study.Cholelithiasis. No finding to suggest cholecystitis. Persistent and unchanged retroperitoneal para-aortic lymphadenopathy.This may represent metastatic lymphadenopathy?    -  1/9/2025- Glucose 116, bili 1.4 otherwise normal CMP. CBC normal      - 1/29/2025- PET/CT- Tracer  uptake at the LEFT posterior and lateral prostate consistent with known malignancy. Local invasion of seminal vesicle demonstrated on prior MRI pelvis 12/6/2024. Appearance on today's exam is also concerning for involvement of the LEFT anterior lateral rectum. Appearance concerning for ruben involvement at least of the pelvis and appears to involve mediastinal, LEFT axillary, and LEFT subclavicular regions. Evidence for bony metastatic disease.  -2/10/2025-hemoglobin 12.1 otherwise normal CBC.  B12 460, folate 16.2, ferritin 231, iron 36, iron saturation 14, TIBC 261, PSA 12, testosterone 426  - 2/17/2025-CMP normal magnesium 1.5 (Mag-Ox called in)    Previous interventions:  - 2/3/2025- Consult Dr. Pat, urology-A/P: Patient with metastatic prostate cancer based on pet imaging. I personally reviewed these images today. In addition he has had urinary retention since his biopsy. I recommend treatment with ADT hopefully this will also shrink up his prostate and allow him to urinate. I do not think he be a candidate for radiation given his multiple metastatic sites. I have made a referral to oncology for oral ADT treatment.     - 2/10/2025- The patient is seen for management considerations. Started:    Prednisone 5 mg p.o. twice daily with abiraterone # 60 x 11 refills  Abiraterone 1000 mg p.o. dailyx 11 refills    - 2/17/2025-Lupron 7.5 mg IM  - 3/17/2025-Lupron 45 mg IM    LABS    Lab Results - Last 18 Months   Lab Units 06/09/25  1205 05/19/25  0940 05/12/25  1222 04/14/25  1234 03/31/25  0920 03/17/25  0921 02/10/25  1607 01/09/25  1023   HEMOGLOBIN g/dL 12.7* 13.2 12.4* 12.8* 12.6* 13.0 12.1* 14.1   HEMATOCRIT % 38.4 40.7 38.5 39.9 38.3 39.0 37.4* 41.2   MCV fL 96.2 96.4 97.0 94.1 94.1 92.4 93.0 90.2   WBC 10*3/mm3 5.15 5.32 5.24 4.70 5.33 5.45 3.93 9.72   RDW % 14.3 14.6 14.6 14.6 14.1 14.2 14.6 13.3   MPV fL 10.4 9.9 10.0 10.0 10.5 9.7 9.8 10.9   PLATELETS 10*3/mm3 124* 124* 120* 143 145 142 149 166   IMM GRAN %  % 0.4 0.6* 0.4 0.2 0.4 0.4 0.3 0.4   NEUTROS ABS 10*3/mm3 3.51 3.95 3.68 3.32 3.83 3.75 2.22 7.41*   LYMPHS ABS 10*3/mm3 1.29 0.89 1.18 1.11 1.02 1.23 1.19 1.37   MONOS ABS 10*3/mm3 0.26 0.41 0.31 0.20 0.38 0.35 0.34 0.86   EOS ABS 10*3/mm3 0.05 0.03 0.04 0.04 0.06 0.09 0.16 0.02   BASOS ABS 10*3/mm3 0.02 0.01 0.01 0.02 0.02 0.01 0.01 0.02   IMMATURE GRANS (ABS) 10*3/mm3 0.02 0.03 0.02 0.01 0.02 0.02 0.01 0.04   NRBC /100 WBC  --   --   --  0.0 0.0  --  0.0 0.0       Lab Results - Last 18 Months   Lab Units 06/09/25  1205 05/19/25  0940 05/12/25  1222 04/14/25  1234 03/31/25  0920 03/17/25  0921   GLUCOSE mg/dL 98 80 98 145* 95 106*   SODIUM mmol/L 142 140 140 139 141 141   POTASSIUM mmol/L 4.5 4.5 4.2 4.5 4.3 4.3   CO2 mmol/L 24.0 28.0 27.0 27.0 25.0 28.0   CHLORIDE mmol/L 107 104 106 104 105 104   ANION GAP mmol/L 11.0 8.0 7.0 8.0 11.0 9.0   CREATININE mg/dL 1.16 1.14 1.07 1.16 1.17 1.11   BUN mg/dL 27.7* 24* 25* 24* 22 23   BUN / CREAT RATIO  23.9 21.1 23.4 20.7 18.8 20.7   CALCIUM mg/dL 8.7 8.6 8.4* 8.5* 8.0* 9.1   ALK PHOS U/L 48 58 53 65 68 66   TOTAL PROTEIN g/dL 5.9* 6.2 6.1 6.4 6.5 6.7   ALT (SGPT) U/L 20 16 16 16 12 12   AST (SGOT) U/L 21 19 16 17 14 15   BILIRUBIN mg/dL 0.6 0.6 0.5 0.6 0.7 0.6   ALBUMIN g/dL 3.8 3.8 3.9 3.8 3.7 4.0   GLOBULIN gm/dL 2.1 2.4 2.2 2.6 2.8 2.7         PAST MEDICAL HISTORY:  ALLERGIES:  Allergies   Allergen Reactions    Rosuvastatin Myalgia     Muscle spasms     CURRENT MEDICATIONS:  Outpatient Encounter Medications as of 6/23/2025   Medication Sig Dispense Refill    abiraterone acetate (ZYTIGA) 250 MG tablet Take 4 tablets by mouth Daily. Take on empty stomach; either 1 hour before or 2 hours after a meal. 120 tablet 11    amLODIPine (NORVASC) 2.5 MG tablet Take 1 tablet by mouth Daily.      Aspirin 81 MG capsule Take 81 mg by mouth Daily.      atorvastatin (LIPITOR) 80 MG tablet Take 0.5 tablets by mouth Every Night.      carvedilol (COREG) 12.5 MG tablet Take 0.5 tablets by  mouth 2 (Two) Times a Day With Meals.      Coenzyme Q10 (Co Q 10) 100 MG capsule Take 100 mg by mouth Daily.      dipyridamole (PERSANTINE) 75 MG tablet Take 1 tablet by mouth 3 (Three) Times a Day.      Flomax 0.4 MG capsule 24 hr capsule Take 1 capsule by mouth Daily.      HYDROcodone-acetaminophen (NORCO) 5-325 MG per tablet Take 1 tablet by mouth Every 4 (Four) Hours As Needed for Moderate Pain. 10 tablet 0    ketorolac (TORADOL) 10 MG tablet Take 1 tablet by mouth Every 6 (Six) Hours As Needed for Moderate Pain (post op pain). 10 tablet 0    lisinopril (PRINIVIL,ZESTRIL) 20 MG tablet Take 0.5 tablets by mouth Daily.      multivitamin with minerals (Multivitamin Adults) tablet tablet Take 1 tablet by mouth Daily. Centrum Silver Men 50+ Multivitamin      Omega-3 Fatty Acids (FISH OIL) 1000 MG capsule capsule Take 1 capsule by mouth Daily With Breakfast.      omeprazole (priLOSEC) 20 MG capsule Take 1 capsule by mouth Daily.      predniSONE (DELTASONE) 5 MG tablet Take 1 tablet by mouth 2 (Two) Times a Day. Start taking medication, the same day as abiraterone [Zytiga]. 60 tablet 11     No facility-administered encounter medications on file as of 6/23/2025.     ADULT ILLNESSES:  Patient Active Problem List   Diagnosis Code    Hx of colonic polyps Z86.0100    Black stools K92.1    Abnormal MRI, pelvis R93.5    Elevated prostate specific antigen (PSA) R97.20    Prostate cancer C61     SURGERIES:  Past Surgical History:   Procedure Laterality Date    CARDIAC CATHETERIZATION      COLONOSCOPY  08/04/2014    Diverticulosis; Questionable polyp on the ileocecal valve-path showed fragments of benign small intestinal mucosa with submucosa, no adenomatous changes identified; One 3mm adenomatous polyp in the ascending colon; One 6mm adenomatous polyp in the hepatic flexure; Repeat 5 years    COLONOSCOPY  07/14/2010    1cm hyperplastic polyp in the distal transverse colon; Diverticulosis; Repeat 4-5 years    COLONOSCOPY   06/13/2007    Diverticulosis; Internal hemorrhoids; 7mm hyperplastic rectal polyp; Repeat 3 years    COLONOSCOPY N/A 07/31/2019    Two 4mm tubular adenomatous polyps in the transverse colon; Diverticulosis in the left colon; Repeat 5 years    COLONOSCOPY N/A 6/7/2024    Procedure: COLONOSCOPY WITH ANESTHESIA;  Surgeon: Ana Oliva MD;  Location: [facility] ENDOSCOPY;  Service: Gastroenterology;  Laterality: N/A;  preop; hx of polyps  postop diverticulosis; polyps   PCP Kurt Pichardo    CORONARY ARTERY BYPASS GRAFT  2003    [place], [place]    ENDOSCOPY N/A 6/7/2024    Procedure: ESOPHAGOGASTRODUODENOSCOPY WITH ANESTHESIA;  Surgeon: Ana Oliva MD;  Location:  [facility] ENDOSCOPY;  Service: Gastroenterology;  Laterality: N/A;  preop; black stools;   postop  PCP Kurt Pichardo    FOOT SURGERY      PROSTATE BIOPSY N/A 1/2/2025    Procedure: PROSTATE ULTRASOUND BIOPSY MRI FUSION WITH URONAV;  Surgeon: Campos Pat MD;  Location: [facility] OR;  Service: Urology;  Laterality: N/A;     HEALTH MAINTENANCE ITEMS:  Health Maintenance Due   Topic Date Due    TDAP/TD VACCINES (1 - Tdap) 04/12/2011    RSV Vaccine - Adults (1 - 1-dose 75+ series) Never done    ANNUAL WELLNESS VISIT  06/18/2019    COVID-19 Vaccine (5 - 2024-25 season) 09/01/2024       <no information>  Last Completed Colonoscopy            Upcoming       COLORECTAL CANCER SCREENING (COLONOSCOPY - Every 3 Years) Next due on 6/7/2027 06/07/2024  Surgical Procedure: COLONOSCOPY    06/07/2024  Colonoscopy    07/31/2019  Surgical Procedure: COLONOSCOPY    07/31/2019  COLONOSCOPY    08/04/2014  SCANNED - COLONOSCOPY     Only the first 5 history entries have been loaded, but more history exists.                        Immunization History   Administered Date(s) Administered    COVID-19 (MODERNA) 1st,2nd,3rd Dose Monovalent 02/26/2021, 03/26/2021    COVID-19 (MODERNA) BIVALENT 12+YRS 10/11/2022    COVID-19 (MODERNA) Monovalent Original Booster 12/14/2021     Last  "Completed Mammogram    This patient has no relevant Health Maintenance data.           FAMILY HISTORY:  Family History   Problem Relation Age of Onset    Colon cancer Neg Hx     Colon polyps Neg Hx     Esophageal cancer Neg Hx     Liver cancer Neg Hx     Liver disease Neg Hx     Rectal cancer Neg Hx     Stomach cancer Neg Hx      SOCIAL HISTORY:  Social History     Socioeconomic History    Marital status:    Tobacco Use    Smoking status: Former     Current packs/day: 0.00     Types: Cigarettes     Quit date: 2003     Years since quittin.9    Smokeless tobacco: Never   Vaping Use    Vaping status: Never Used   Substance and Sexual Activity    Alcohol use: No    Drug use: Defer    Sexual activity: Defer       REVIEW OF SYSTEMS:  Review of Systems   Constitutional:  Positive for appetite change. Negative for activity change and unexpected weight loss (60 pounds in the past 24 months.  Has stabilized and is regaining).        Manages his ADLs to include chores, errands and driving.  Is up and about, \"most of the time\".  States he likes working in his garden    Lupron/abiraterone tolerance: Mild fatigue.  Mild hot flashes.  No nausea.  No vomiting.  Edema.  No rash.  No arthralgias.  States he is able to continue   HENT:  Positive for hearing loss.    Eyes: Negative.  Negative for blurred vision.        Cataract removal OD- 25; OS pending   Respiratory: Negative.          Ex cigarette smoker, 2 packs/day.  Quit over 30 years ago   Cardiovascular: Negative.    Gastrointestinal: Negative.    Endocrine: Negative.    Genitourinary:  Positive for decreased urine volume, difficulty urinating (Now wears a Santana catheter with a leg bag), nocturia and urinary incontinence.        Due for catheter exchange today   Musculoskeletal: Negative.    Allergic/Immunologic: Negative.    Neurological: Negative.    Hematological: Negative.    Psychiatric/Behavioral: Negative.     All other systems reviewed and are " "negative.    /70   Pulse 79   Temp 97.2 °F (36.2 °C) (Temporal)   Resp 18   Ht 180.3 cm (71\")   Wt 89.5 kg (197 lb 6.4 oz)   SpO2 99%   BMI 27.53 kg/m²  Body surface area is 2.1 meters squared.  Pain Score    06/23/25 0845   PainSc: 0-No pain         Physical Exam  Vitals and nursing note reviewed.   Constitutional:       Comments: Pleasant, cooperative, heavyset, modestly kept elderly male.  Ambulatory.  ECOG 1.      Has regained 11 lb (had lost 3 lb at his prior visit) since his last visit   HENT:      Head: Normocephalic and atraumatic.   Eyes:      General: No scleral icterus.     Extraocular Movements: Extraocular movements intact.      Pupils: Pupils are equal, round, and reactive to light.   Cardiovascular:      Rate and Rhythm: Normal rate.   Pulmonary:      Effort: Pulmonary effort is normal.   Abdominal:      Palpations: Abdomen is soft.      Tenderness: There is no abdominal tenderness.   Genitourinary:     Comments: Patient wearing a Santana catheter attached to a right leg bag  Musculoskeletal:         General: Normal range of motion.      Cervical back: Normal range of motion.   Lymphadenopathy:      Cervical: No cervical adenopathy.   Skin:     General: Skin is warm.   Neurological:      General: No focal deficit present.      Mental Status: He is alert.   Psychiatric:         Mood and Affect: Mood normal.         Behavior: Behavior normal.         Thought Content: Thought content normal.           Assessment:  1.  Prostate adenocarcinoma          -Original Tumor stage:  IV- Bright 4+ 5 = 13/13 cores         -Original tumor burden:  -- 1/9/2025- CT abdomen/pelvis- A poorly defined mass between the enlarged prostate and the rectum which shows ill-defined septated low density areas was not noted in the previous study and may represent a mass from the prostate involving the rectum. There is complete loss of fat plane between the prostate and the rectum. Moderate diffuse thickening of the wall " of the mid and distal rectum may be due to inflammatory/infectious process or involvement by the prostatic neoplasm. Details given above.Evolving metastatic lesions of the liver which have increased in size since the previous study in October 2024. There is significant dilatation of the urinary bladder which reaches into the abdomen to the level of the umbilicus. This is probably due to outlet obstruction from the enlarged prostate. There is evidence of bilateral hydronephrosis and hydroureter probably due to vesicoureteral reflux. This was not noted in the previous study.  Cholelithiasis. No finding to suggest cholecystitis. Persistent and unchanged retroperitoneal para-aortic lymphadenopathy.This may represent metastatic lymphadenopathy?    - 1/29/2025- PET/CT- Tracer uptake at the LEFT posterior and lateral prostate consistent with known malignancy. Local invasion of seminal vesicle demonstrated on prior MRI pelvis 12/6/2024. Appearance on today's exam is also concerning for involvement of the LEFT anterior lateral rectum. Appearance concerning for ruben involvement at least of the pelvis and appears to involve mediastinal, LEFT axillary, and LEFT subclavicular regions. Evidence for bony metastatic disease.      -Tumor status:   - 2/10/2025- Started:    Prednisone 5 mg p.o. twice daily with abiraterone # 60 x 11 refills  Abiraterone 1000 mg p.o. dailyx 11 refills    - 2/17/2025-Lupron 7.5 mg IM  - 3/17/2025-Lupron 45 mg IM    -PSA 7.6, 5/19/25 (prior: 10.8- 12,0)    2.   CAD/CABG  3.   Anemia with evidence for anemia of chronic disease   - Hgb 12.7; MCV 96.2, 6/9/25 (prior ashwin: Hgb 12.1, 2/10/25)  4.   Thrombocytopenia. ITP?   -Stable. 124,000, 6/9/25 (ashwin: 120,000)  5.   Mild hot flashes.    6.   Poor prognosis     Recommendations:  1.   Draw PSA and testosterone today.  Review other labs:  -2/10/2025-hemoglobin 12.1 otherwise normal CBC.  B12 460, folate 16.2, ferritin 231, iron 36, iron saturation 14, TIBC  261, PSA 12, testosterone 426  - 2/17/2025-CMP normal magnesium 1.5 (Mag-Ox called in)  - 3/17/2025-Hgb 13 otherwise normal CBC, normal CMP.  PSA/testosterone pending  - 5/19/2025- PSA 7.6 (prior: 10-12)  - 6/9/2025-6/10/2025-TP 5.9 otherwise normal CMP, testosterone <3, Hgb 12.7, plat 124,000  2.   Prior review available information including the pathology from biopsy, imaging (CT abdomen/pelvis and PET scan above) most recent PSA pending  3.    Review NCCN guidelines prostate cancer -systemic therapy for castration naïve disease- M1: ADT (LHRH agonist or orchiectomy) with one of the following: Preferred regimens: Apalutamide, abiraterone, enzalutamide (category 1); OR ADT with docetaxel 75 mg/m2 for 6 cycles and one of the following (preferred) abiraterone (category 1), darolutamide (category 1); OR EBRT to the primary tumor bed for low-volume M1 or ADT.  Follow-up: Physical exam with PSA every 3-6 months.  Imaging for symptoms.  Conventional imaging every 3-6 months for patients with M1 to monitor treatment response..     4.   Abiraterone and Lupron tolerance discussed.  Mild fatigue and hot flashes otherwise no inordinate toxicities.  Is willing to press on.  5.  Apprised of the potential toxicities of leuprolide (to include but not limited to: Hot flashes/diaphoresis,transient tumor flare, headache, depression, emotional lability, generalized pain, dyspepsia, edema, weight changes, dizziness, acne, gynecomastia, insomnia, sexual dysfunction, urogenital disorder, paresthesias, neuromuscular disorders, asthenia, anaphylaxis, bone density loss, suicidality, seizures, hepatotoxicity, diabetes, ureteral obstruction, spinal cord compression, myocardial infarction, stroke, sudden death, Q/T prolongation).  He agrees to press on with therapy.    6.   Abiraterone.  Potential toxicities discussed: Hypertension, fluid retention, hypokalemia, QT prolongation, torsade de pointes, arrhythmia, cardiac failure, adrenal  "insufficiency, hepatotoxicity, hepatitis, hepatic failure, fractures, anaphylaxis, rhabdomyolysis, hypertension, arthralgia, hot flashes, edema, diarrhea, cough, fatigue, myalgia, UTI, constipation, hypokalemia, URI, dyspepsia, elevated liver enzymes, insomnia, confusion, dyspnea, arrhythmia, hematuria, cardiac failure, nocturia, fever, fractures, headache, rash, infection, hypertriglyceridemia, hyperglycemia, anemia, hypercholesterolemia.  Questions answered.  He agrees to press on with therapy.     7.  Rx:      Abiraterone 1000 mg p.o. dailyx 11 refills  Prednisone 5 mg p.o. twice daily with abiraterone # 60 x 11 refills  Leuprolide or degarelix - per Dr. Pat.     8.  Xgeva 120 mg sc this week then every 4 weeks   9.  Discussed tertiary care referral Re:  if patient wishes a second opinion for management /clinical trials.  For now wants to stay local.  Again states, \"maybe later\"  10.  Return to the office in 12 weeks with pre-office CBC with differential, CMP, testosterone and PSA.          I spent ~33 minutes caring for Francisco \"García\" on this date of service. This time includes time spent by me in the following activities: preparing for the visit, reviewing tests, performing a medically appropriate examination and/or evaluation, counseling and educating the patient/family/caregiver, ordering medications, tests, or procedures and documenting information in the medical record.           "

## 2025-06-23 ENCOUNTER — INFUSION (OUTPATIENT)
Dept: ONCOLOGY | Facility: HOSPITAL | Age: 80
End: 2025-06-23
Payer: MEDICARE

## 2025-06-23 ENCOUNTER — LAB (OUTPATIENT)
Dept: LAB | Facility: HOSPITAL | Age: 80
End: 2025-06-23
Payer: MEDICARE

## 2025-06-23 ENCOUNTER — OFFICE VISIT (OUTPATIENT)
Dept: ONCOLOGY | Facility: CLINIC | Age: 80
End: 2025-06-23
Payer: MEDICARE

## 2025-06-23 ENCOUNTER — PROCEDURE VISIT (OUTPATIENT)
Dept: UROLOGY | Facility: CLINIC | Age: 80
End: 2025-06-23
Payer: MEDICARE

## 2025-06-23 VITALS
RESPIRATION RATE: 16 BRPM | HEART RATE: 77 BPM | DIASTOLIC BLOOD PRESSURE: 65 MMHG | TEMPERATURE: 97 F | OXYGEN SATURATION: 98 % | SYSTOLIC BLOOD PRESSURE: 129 MMHG

## 2025-06-23 VITALS
WEIGHT: 197.4 LBS | OXYGEN SATURATION: 99 % | TEMPERATURE: 97.2 F | SYSTOLIC BLOOD PRESSURE: 122 MMHG | DIASTOLIC BLOOD PRESSURE: 70 MMHG | HEART RATE: 79 BPM | RESPIRATION RATE: 18 BRPM | BODY MASS INDEX: 27.64 KG/M2 | HEIGHT: 71 IN

## 2025-06-23 DIAGNOSIS — R33.9 URINARY RETENTION: Primary | ICD-10-CM

## 2025-06-23 DIAGNOSIS — C61 PROSTATE CANCER: ICD-10-CM

## 2025-06-23 DIAGNOSIS — C61 PROSTATE CANCER: Primary | ICD-10-CM

## 2025-06-23 LAB
ALBUMIN SERPL-MCNC: 4 G/DL (ref 3.5–5.2)
ALBUMIN/GLOB SERPL: 2 G/DL
ALP SERPL-CCNC: 45 U/L (ref 39–117)
ALT SERPL W P-5'-P-CCNC: 21 U/L (ref 1–41)
ANION GAP SERPL CALCULATED.3IONS-SCNC: 11 MMOL/L (ref 5–15)
AST SERPL-CCNC: 21 U/L (ref 1–40)
BASOPHILS # BLD AUTO: 0.01 10*3/MM3 (ref 0–0.2)
BASOPHILS NFR BLD AUTO: 0.2 % (ref 0–1.5)
BILIRUB SERPL-MCNC: 0.5 MG/DL (ref 0–1.2)
BUN SERPL-MCNC: 22 MG/DL (ref 8–23)
BUN/CREAT SERPL: 19.3 (ref 7–25)
CALCIUM SPEC-SCNC: 8.4 MG/DL (ref 8.6–10.5)
CHLORIDE SERPL-SCNC: 106 MMOL/L (ref 98–107)
CO2 SERPL-SCNC: 22 MMOL/L (ref 22–29)
CREAT SERPL-MCNC: 1.14 MG/DL (ref 0.76–1.27)
DEPRECATED RDW RBC AUTO: 49.5 FL (ref 37–54)
EGFRCR SERPLBLD CKD-EPI 2021: 64.6 ML/MIN/1.73
EOSINOPHIL # BLD AUTO: 0.03 10*3/MM3 (ref 0–0.4)
EOSINOPHIL NFR BLD AUTO: 0.5 % (ref 0.3–6.2)
ERYTHROCYTE [DISTWIDTH] IN BLOOD BY AUTOMATED COUNT: 14 % (ref 12.3–15.4)
GLOBULIN UR ELPH-MCNC: 2 GM/DL
GLUCOSE SERPL-MCNC: 92 MG/DL (ref 65–99)
HCT VFR BLD AUTO: 38.6 % (ref 37.5–51)
HGB BLD-MCNC: 12.7 G/DL (ref 13–17.7)
IMM GRANULOCYTES # BLD AUTO: 0.03 10*3/MM3 (ref 0–0.05)
IMM GRANULOCYTES NFR BLD AUTO: 0.5 % (ref 0–0.5)
LYMPHOCYTES # BLD AUTO: 1.36 10*3/MM3 (ref 0.7–3.1)
LYMPHOCYTES NFR BLD AUTO: 24.8 % (ref 19.6–45.3)
MAGNESIUM SERPL-MCNC: 1.6 MG/DL (ref 1.6–2.4)
MCH RBC QN AUTO: 31.5 PG (ref 26.6–33)
MCHC RBC AUTO-ENTMCNC: 32.9 G/DL (ref 31.5–35.7)
MCV RBC AUTO: 95.8 FL (ref 79–97)
MONOCYTES # BLD AUTO: 0.29 10*3/MM3 (ref 0.1–0.9)
MONOCYTES NFR BLD AUTO: 5.3 % (ref 5–12)
NEUTROPHILS NFR BLD AUTO: 3.76 10*3/MM3 (ref 1.7–7)
NEUTROPHILS NFR BLD AUTO: 68.7 % (ref 42.7–76)
NRBC BLD AUTO-RTO: 0 /100 WBC (ref 0–0.2)
PHOSPHATE SERPL-MCNC: 3.5 MG/DL (ref 2.5–4.5)
PLATELET # BLD AUTO: 143 10*3/MM3 (ref 140–450)
PMV BLD AUTO: 10.5 FL (ref 6–12)
POTASSIUM SERPL-SCNC: 4.5 MMOL/L (ref 3.5–5.2)
PROT SERPL-MCNC: 6 G/DL (ref 6–8.5)
RBC # BLD AUTO: 4.03 10*6/MM3 (ref 4.14–5.8)
SODIUM SERPL-SCNC: 139 MMOL/L (ref 136–145)
WBC NRBC COR # BLD AUTO: 5.48 10*3/MM3 (ref 3.4–10.8)

## 2025-06-23 PROCEDURE — 25010000002 DENOSUMAB 120 MG/1.7ML SOLUTION: Performed by: INTERNAL MEDICINE

## 2025-06-23 PROCEDURE — 84153 ASSAY OF PSA TOTAL: CPT

## 2025-06-23 PROCEDURE — 99214 OFFICE O/P EST MOD 30 MIN: CPT | Performed by: INTERNAL MEDICINE

## 2025-06-23 PROCEDURE — 96372 THER/PROPH/DIAG INJ SC/IM: CPT

## 2025-06-23 PROCEDURE — 84403 ASSAY OF TOTAL TESTOSTERONE: CPT

## 2025-06-23 PROCEDURE — 80053 COMPREHEN METABOLIC PANEL: CPT

## 2025-06-23 PROCEDURE — 85025 COMPLETE CBC W/AUTO DIFF WBC: CPT

## 2025-06-23 PROCEDURE — 36415 COLL VENOUS BLD VENIPUNCTURE: CPT

## 2025-06-23 PROCEDURE — 83735 ASSAY OF MAGNESIUM: CPT

## 2025-06-23 PROCEDURE — 1126F AMNT PAIN NOTED NONE PRSNT: CPT | Performed by: INTERNAL MEDICINE

## 2025-06-23 PROCEDURE — 1160F RVW MEDS BY RX/DR IN RCRD: CPT | Performed by: INTERNAL MEDICINE

## 2025-06-23 PROCEDURE — 84100 ASSAY OF PHOSPHORUS: CPT

## 2025-06-23 PROCEDURE — 1159F MED LIST DOCD IN RCRD: CPT | Performed by: INTERNAL MEDICINE

## 2025-06-23 RX ADMIN — DENOSUMAB 120 MG: 120 INJECTION SUBCUTANEOUS at 14:37

## 2025-06-23 NOTE — PROGRESS NOTES
Francisco Sinclair is here today for catheter change.  Patient is seen by Dr. Pat.  The old catheter was removed without difficulty. Using sterile technique the patient was prepped with Hibiclens and new 20 [place] coude catheter was placed. 10 cc of sterile water used to inflated the balloon and catheter was then attached to a leg bag. Patient tolerated the procedure well.  UJLIA Faulkner was in the office at the time of procedure. The patient was advised to return in one month for next catheter change. Patient verbalized understanding.     I have reviewed and agree with medical assistance documentation above. Samantha Ku MA

## 2025-06-23 NOTE — PROGRESS NOTES
Message sent to Dr. Santa.  Francisco Sinclair [birth year] here for Xgeva. Calcium 8.4. Please review labs and corrected calcium and let me know. Thanks.

## 2025-06-23 NOTE — PROGRESS NOTES
Pt came back to office with complaints of pain and discomfort in perineum when sitting or standing. I deflated pt balloon, advanced catheter to hub, and reinflated balloon. Pt states that that felt much better. Pt advised to call or come back to office if he has any problems. SANDIE Hernandez Pa-C was in the office at the time of procedure.         I have reviewed and agree with medical assistance documentation above

## 2025-06-24 LAB
PSA SERPL-MCNC: 6.53 NG/ML (ref 0–4)
TESTOST SERPL-MCNC: <2.5 NG/DL (ref 193–740)

## 2025-06-27 ENCOUNTER — PROCEDURE VISIT (OUTPATIENT)
Dept: UROLOGY | Facility: CLINIC | Age: 80
End: 2025-06-27
Payer: MEDICARE

## 2025-06-27 DIAGNOSIS — R33.9 URINARY RETENTION: Primary | ICD-10-CM

## 2025-06-27 NOTE — PROGRESS NOTES
Francisco Sinclair is here today for catheter change.  Patient is seen by Dr. Pat.  The old catheter was removed without difficulty. Using sterile technique the patient was prepped with Hibiclens and new 20F Coude catheter was placed. 10 cc of sterile water used to inflated the balloon and catheter was then attached to a leg bag. Patient tolerated the procedure well.  RACHEL Khalil was in the office at the time of procedure. The patient was advised to return in 1 month for next catheter change. Patient verbalized understanding.     I have reviewed and agree with medical assistance documentation above     Patient was in on 6/23/25 to have his catheter changed. Patient stated he catheter did not feel right. So, I just took out the catheter and replaced a whole new one.

## 2025-07-11 NOTE — PROGRESS NOTES
Subjective    Mr. Sinclair is 79 y.o. male    Chief Complaint: Prostate Cancer    History of Present Illness  Patient for prostate cancer. Patient underwent a MRI fusion biopsy 1/2/2025 which revealed Bright 4+ 5 = adenocarcinoma the prostate 13/13 cores. . This was done because of a grossly abnormal digital rectal exam. Volume 84.6cc.  PSA 7.36. PET/CT 1/29/2025 shows concerning ruben involvement in the pelvis as well as mediastinum left axillary and left subclavicular regions. In addition there are several foci of abnormal tracer uptake with a sclerotic lesion located the right anterior seventh rib consistent with bony metastatic disease. Patient has been catheter dependent since biopsy with multiple failed voiding trials. Patient on Lupron and Zytiga.      Lab Results   Component Value Date    PSA 6.530 (H) 06/23/2025    PSA 7.630 (H) 05/19/2025    PSA 10.800 (H) 03/31/2025       The following portions of the patient's history were reviewed and updated as appropriate: allergies, current medications, past family history, past medical history, past social history, past surgical history and problem list.    Review of Systems      Current Outpatient Medications:     abiraterone acetate (ZYTIGA) 250 MG tablet, Take 4 tablets by mouth Daily. Take on empty stomach; either 1 hour before or 2 hours after a meal., Disp: 120 tablet, Rfl: 11    amLODIPine (NORVASC) 2.5 MG tablet, Take 1 tablet by mouth Daily., Disp: , Rfl:     Aspirin 81 MG capsule, Take 81 mg by mouth Daily., Disp: , Rfl:     atorvastatin (LIPITOR) 80 MG tablet, Take 0.5 tablets by mouth Every Night., Disp: , Rfl:     brimonidine (ALPHAGAN) 0.2 % ophthalmic solution, Apply  to eye(s) as directed by provider., Disp: , Rfl:     carvedilol (COREG) 12.5 MG tablet, Take 0.5 tablets by mouth 2 (Two) Times a Day With Meals., Disp: , Rfl:     Coenzyme Q10 (Co Q 10) 100 MG capsule, Take 100 mg by mouth Daily., Disp: , Rfl:     dipyridamole (PERSANTINE) 75 MG tablet,  Take 1 tablet by mouth 3 (Three) Times a Day., Disp: , Rfl:     Flomax 0.4 MG capsule 24 hr capsule, Take 1 capsule by mouth Daily., Disp: , Rfl:     HYDROcodone-acetaminophen (NORCO) 5-325 MG per tablet, Take 1 tablet by mouth Every 4 (Four) Hours As Needed for Moderate Pain., Disp: 10 tablet, Rfl: 0    ketorolac (TORADOL) 10 MG tablet, Take 1 tablet by mouth Every 6 (Six) Hours As Needed for Moderate Pain (post op pain)., Disp: 10 tablet, Rfl: 0    lisinopril (PRINIVIL,ZESTRIL) 20 MG tablet, Take 0.5 tablets by mouth Daily., Disp: , Rfl:     multivitamin with minerals (Multivitamin Adults) tablet tablet, Take 1 tablet by mouth Daily. Centrum Silver Men 50+ Multivitamin, Disp: , Rfl:     Omega-3 Fatty Acids (FISH OIL) 1000 MG capsule capsule, Take 1 capsule by mouth Daily With Breakfast., Disp: , Rfl:     omeprazole (priLOSEC) 20 MG capsule, Take 1 capsule by mouth Daily., Disp: , Rfl:     predniSONE (DELTASONE) 5 MG tablet, Take 1 tablet by mouth 2 (Two) Times a Day. Start taking medication, the same day as abiraterone [Zytiga]., Disp: 60 tablet, Rfl: 11    Past Medical History:   Diagnosis Date    CAD (coronary artery disease)     Diverticulosis     Elevated cholesterol     GERD (gastroesophageal reflux disease)     History of adenomatous polyp of colon     History of colon polyps     Hyperlipidemia     Hypertension        Past Surgical History:   Procedure Laterality Date    CARDIAC CATHETERIZATION      COLONOSCOPY  08/04/2014    Diverticulosis; Questionable polyp on the ileocecal valve-path showed fragments of benign small intestinal mucosa with submucosa, no adenomatous changes identified; One 3mm adenomatous polyp in the ascending colon; One 6mm adenomatous polyp in the hepatic flexure; Repeat 5 years    COLONOSCOPY  07/14/2010    1cm hyperplastic polyp in the distal transverse colon; Diverticulosis; Repeat 4-5 years    COLONOSCOPY  06/13/2007    Diverticulosis; Internal hemorrhoids; 7mm hyperplastic rectal  "polyp; Repeat 3 years    COLONOSCOPY N/A 2019    Two 4mm tubular adenomatous polyps in the transverse colon; Diverticulosis in the left colon; Repeat 5 years    COLONOSCOPY N/A 2024    Procedure: COLONOSCOPY WITH ANESTHESIA;  Surgeon: Ana Oliva MD;  Location: [facility] ENDOSCOPY;  Service: Gastroenterology;  Laterality: N/A;  preop; hx of polyps  postop diverticulosis; polyps   PCP Kurt Pichardo    CORONARY ARTERY BYPASS GRAFT      [place], [place]    ENDOSCOPY N/A 2024    Procedure: ESOPHAGOGASTRODUODENOSCOPY WITH ANESTHESIA;  Surgeon: Ana Oliva MD;  Location: [facility] ENDOSCOPY;  Service: Gastroenterology;  Laterality: N/A;  preop; black stools;   postop  PCP Kurt Pichardo    FOOT SURGERY      PROSTATE BIOPSY N/A 2025    Procedure: PROSTATE ULTRASOUND BIOPSY MRI FUSION WITH URONAV;  Surgeon: Campos Pat MD;  Location: [facility] OR;  Service: Urology;  Laterality: N/A;       Social History     Socioeconomic History    Marital status:    Tobacco Use    Smoking status: Former     Current packs/day: 0.00     Types: Cigarettes     Quit date: 2003     Years since quittin.0    Smokeless tobacco: Never   Vaping Use    Vaping status: Never Used   Substance and Sexual Activity    Alcohol use: No    Drug use: Never    Sexual activity: Defer       Family History   Problem Relation Age of Onset    Colon cancer Neg Hx     Colon polyps Neg Hx     Esophageal cancer Neg Hx     Liver cancer Neg Hx     Liver disease Neg Hx     Rectal cancer Neg Hx     Stomach cancer Neg Hx        Objective    Temp 97 °F (36.1 °C)   Ht 180.3 cm (71\")   Wt 92.4 kg (203 lb 9.6 oz)   BMI 28.40 kg/m²     Physical Exam        Results for orders placed or performed in visit on 25   Comprehensive metabolic panel    Collection Time: 25  1:27 PM    Specimen: Blood   Result Value Ref Range    Glucose 85 65 - 99 mg/dL    BUN 24.4 (H) 8.0 - 23.0 mg/dL    Creatinine 1.21 0.76 - 1.27 mg/dL    Sodium 144 " 136 - 145 mmol/L    Potassium 4.5 3.5 - 5.2 mmol/L    Chloride 110 (H) 98 - 107 mmol/L    CO2 24.0 22.0 - 29.0 mmol/L    Calcium 8.5 (L) 8.6 - 10.5 mg/dL    Total Protein 5.9 (L) 6.0 - 8.5 g/dL    Albumin 3.7 3.5 - 5.2 g/dL    ALT (SGPT) 21 1 - 41 U/L    AST (SGOT) 20 1 - 40 U/L    Alkaline Phosphatase 54 39 - 117 U/L    Total Bilirubin 0.4 0.0 - 1.2 mg/dL    Globulin 2.2 gm/dL    A/G Ratio 1.7 g/dL    BUN/Creatinine Ratio 20.2 7.0 - 25.0    Anion Gap 10.0 5.0 - 15.0 mmol/L    eGFR 60.2 >60.0 mL/min/1.73   Magnesium    Collection Time: 07/21/25  1:27 PM    Specimen: Blood   Result Value Ref Range    Magnesium 1.6 1.6 - 2.4 mg/dL   Phosphorus    Collection Time: 07/21/25  1:27 PM    Specimen: Blood   Result Value Ref Range    Phosphorus 3.8 2.5 - 4.5 mg/dL   CBC Auto Differential    Collection Time: 07/21/25  1:27 PM    Specimen: Blood   Result Value Ref Range    WBC 6.06 3.40 - 10.80 10*3/mm3    RBC 3.92 (L) 4.14 - 5.80 10*6/mm3    Hemoglobin 12.6 (L) 13.0 - 17.7 g/dL    Hematocrit 38.9 37.5 - 51.0 %    MCV 99.2 (H) 79.0 - 97.0 fL    MCH 32.1 26.6 - 33.0 pg    MCHC 32.4 31.5 - 35.7 g/dL    RDW 13.7 12.3 - 15.4 %    RDW-SD 49.9 37.0 - 54.0 fl    MPV 9.9 6.0 - 12.0 fL    Platelets 133 (L) 140 - 450 10*3/mm3    Neutrophil % 72.7 42.7 - 76.0 %    Lymphocyte % 21.0 19.6 - 45.3 %    Monocyte % 5.0 5.0 - 12.0 %    Eosinophil % 0.8 0.3 - 6.2 %    Basophil % 0.2 0.0 - 1.5 %    Immature Grans % 0.3 0.0 - 0.5 %    Neutrophils, Absolute 4.41 1.70 - 7.00 10*3/mm3    Lymphocytes, Absolute 1.27 0.70 - 3.10 10*3/mm3    Monocytes, Absolute 0.30 0.10 - 0.90 10*3/mm3    Eosinophils, Absolute 0.05 0.00 - 0.40 10*3/mm3    Basophils, Absolute 0.01 0.00 - 0.20 10*3/mm3    Immature Grans, Absolute 0.02 0.00 - 0.05 10*3/mm3   IPSS Questionnaire (AUA-7):                Assessment and Plan    Diagnoses and all orders for this visit:    1. Prostate cancer (Primary)    2. Urinary retention    3. BPH with urinary obstruction      Reviewed notes  from Dr. Cannon on.  He is on Zytiga.    Patient continues to be catheter dependent he is failed multiple voiding trials.  He is scheduled for jaw surgery in a few weeks.  I will have him return the office following this we will do a fill and pull.  If he is unable to urinate then we will proceed with TURP.  Discussed TURP in depth with the patient.

## 2025-07-14 DIAGNOSIS — C61 PROSTATE CANCER: Primary | ICD-10-CM

## 2025-07-17 ENCOUNTER — OFFICE VISIT (OUTPATIENT)
Dept: OTOLARYNGOLOGY | Facility: CLINIC | Age: 80
End: 2025-07-17
Payer: MEDICARE

## 2025-07-17 VITALS
SYSTOLIC BLOOD PRESSURE: 132 MMHG | DIASTOLIC BLOOD PRESSURE: 76 MMHG | BODY MASS INDEX: 27.63 KG/M2 | HEIGHT: 71 IN | HEART RATE: 74 BPM | TEMPERATURE: 98.6 F | WEIGHT: 197.38 LBS

## 2025-07-17 DIAGNOSIS — S02.609S: ICD-10-CM

## 2025-07-17 DIAGNOSIS — S01.502D: Primary | ICD-10-CM

## 2025-07-17 PROBLEM — S02.609A CLOSED FRACTURE OF RIGHT SIDE OF MANDIBLE: Status: ACTIVE | Noted: 2025-07-17

## 2025-07-17 PROBLEM — S01.502A OPEN WOUND OF MOUTH: Status: ACTIVE | Noted: 2025-07-17

## 2025-07-17 RX ORDER — BRIMONIDINE TARTRATE 2 MG/ML
SOLUTION/ DROPS OPHTHALMIC
COMMUNITY
Start: 2025-07-11

## 2025-07-17 NOTE — PROGRESS NOTES
YOB: 1945  Location: [facility]  Location Address: [address] [place] [address],  [address], [address] [place] [address]  Location Phone: [phone]    Chief Complaint   Patient presents with    Mouth Lesions     Right lower gum       History of Present Illness  Francisco Sinclair is a 79 y.o. male.  Francisco Sinclair is here for follow up of ENT complaints. The patient has had problems with oral lesion. This has been present for the last several months. It is localized to the right bottom gums. He is unable to wear his dentures due to discomfort.    Reviewed:       Study Result    Narrative & Impression   CT MAXILLOFACIAL WO CONT- 2024 12:54 PM     HISTORY: previous mandible fracture; S02.609S-Fracture of mandible,  unspecified, sequela     DOSE LENGTH PRODUCT: 358.75 mGy.cm. Automated exposure control was also  utilized to decrease patient radiation dose.     Technique: Axial CT of the face without IV contrast. Sagittal and  coronal reformations are also provided for review in both bone and soft  tissue windows.     Comparison: None     Findings:      Previous parasymphyseal right mandible fracture status post open  reduction internal fixation. This is well-healed. No hardware loosening.  Normal alignment at the TMJs. Paranasal sinuses are clear. Orbital  contents are unremarkable. Pharyngeal airway is unremarkable. No  cervical adenopathy identified. Major salivary glands are unremarkable.  Visualized intracranial contents are unremarkable.     IMPRESSION:  Impression:       1. Remote parasymphyseal right mandible fracture status post open  reduction internal fixation. Fracture is well-healed. Clinical history  describes plate exposure through the mucosa. The plate itself is proud  off the bone (as can be seen on axial image 67 and sagittal image 48),  however, there is no evidence of screw loosening or screw retraction.     This report was signed and finalized on 2024 3:05 PM by Dr Gregorio Ewing.        Past  Medical History:   Diagnosis Date    CAD (coronary artery disease)     Diverticulosis     Elevated cholesterol     GERD (gastroesophageal reflux disease)     History of adenomatous polyp of colon     History of colon polyps     Hyperlipidemia     Hypertension        Past Surgical History:   Procedure Laterality Date    CARDIAC CATHETERIZATION      COLONOSCOPY  08/04/2014    Diverticulosis; Questionable polyp on the ileocecal valve-path showed fragments of benign small intestinal mucosa with submucosa, no adenomatous changes identified; One 3mm adenomatous polyp in the ascending colon; One 6mm adenomatous polyp in the hepatic flexure; Repeat 5 years    COLONOSCOPY  07/14/2010    1cm hyperplastic polyp in the distal transverse colon; Diverticulosis; Repeat 4-5 years    COLONOSCOPY  06/13/2007    Diverticulosis; Internal hemorrhoids; 7mm hyperplastic rectal polyp; Repeat 3 years    COLONOSCOPY N/A 07/31/2019    Two 4mm tubular adenomatous polyps in the transverse colon; Diverticulosis in the left colon; Repeat 5 years    COLONOSCOPY N/A 6/7/2024    Procedure: COLONOSCOPY WITH ANESTHESIA;  Surgeon: Ana Oliva MD;  Location: [facility] ENDOSCOPY;  Service: Gastroenterology;  Laterality: N/A;  preop; hx of polyps  postop diverticulosis; polyps   PCP Kurt Pichardo    CORONARY ARTERY BYPASS GRAFT  2003    [place], [place]    ENDOSCOPY N/A 6/7/2024    Procedure: ESOPHAGOGASTRODUODENOSCOPY WITH ANESTHESIA;  Surgeon: Ana Oliva MD;  Location: [facility] ENDOSCOPY;  Service: Gastroenterology;  Laterality: N/A;  preop; black stools;   postop  PCP Kurt Pichardo    FOOT SURGERY      PROSTATE BIOPSY N/A 1/2/2025    Procedure: PROSTATE ULTRASOUND BIOPSY MRI FUSION WITH URONAV;  Surgeon: Campos Pat MD;  Location: [facility] OR;  Service: Urology;  Laterality: N/A;       Outpatient Medications Marked as Taking for the 7/17/25 encounter (Office Visit) with Gutierrez Miles MD   Medication Sig Dispense Refill    abiraterone  acetate (ZYTIGA) 250 MG tablet Take 4 tablets by mouth Daily. Take on empty stomach; either 1 hour before or 2 hours after a meal. 120 tablet 11    amLODIPine (NORVASC) 2.5 MG tablet Take 1 tablet by mouth Daily.      Aspirin 81 MG capsule Take 81 mg by mouth Daily.      atorvastatin (LIPITOR) 80 MG tablet Take 0.5 tablets by mouth Every Night.      brimonidine (ALPHAGAN) 0.2 % ophthalmic solution Apply  to eye(s) as directed by provider.      carvedilol (COREG) 12.5 MG tablet Take 0.5 tablets by mouth 2 (Two) Times a Day With Meals.      Coenzyme Q10 (Co Q 10) 100 MG capsule Take 100 mg by mouth Daily.      dipyridamole (PERSANTINE) 75 MG tablet Take 1 tablet by mouth 3 (Three) Times a Day.      Flomax 0.4 MG capsule 24 hr capsule Take 1 capsule by mouth Daily.      lisinopril (PRINIVIL,ZESTRIL) 20 MG tablet Take 0.5 tablets by mouth Daily.      multivitamin with minerals (Multivitamin Adults) tablet tablet Take 1 tablet by mouth Daily. Centrum Silver Men 50+ Multivitamin      Omega-3 Fatty Acids (FISH OIL) 1000 MG capsule capsule Take 1 capsule by mouth Daily With Breakfast.      omeprazole (priLOSEC) 20 MG capsule Take 1 capsule by mouth Daily.      predniSONE (DELTASONE) 5 MG tablet Take 1 tablet by mouth 2 (Two) Times a Day. Start taking medication, the same day as abiraterone [Zytiga]. 60 tablet 11       Rosuvastatin    Family History   Problem Relation Age of Onset    Colon cancer Neg Hx     Colon polyps Neg Hx     Esophageal cancer Neg Hx     Liver cancer Neg Hx     Liver disease Neg Hx     Rectal cancer Neg Hx     Stomach cancer Neg Hx        Social History     Socioeconomic History    Marital status:    Tobacco Use    Smoking status: Former     Current packs/day: 0.00     Types: Cigarettes     Quit date: 2003     Years since quittin.0    Smokeless tobacco: Never   Vaping Use    Vaping status: Never Used   Substance and Sexual Activity    Alcohol use: No    Drug use: Never    Sexual  activity: Defer       Review of Systems   Constitutional: Negative.    HENT:  Positive for mouth sores.        Vitals:    07/17/25 0906   BP: 132/76   Pulse: 74   Temp: 98.6 °F (37 °C)       Body mass index is 27.53 kg/m².    Objective     Physical Exam  Vitals reviewed.   Constitutional:       Appearance: He is normal weight.   HENT:      Head: Normocephalic.      Right Ear: Tympanic membrane, ear canal and external ear normal.      Left Ear: Tympanic membrane, ear canal and external ear normal.      Nose: Nose normal.      Mouth/Throat:      Lips: Pink.      Mouth: Mucous membranes are moist.      Comments: Edentulous  Lower right with area of exposed plate no surround erythema or edema     Neurological:      Mental Status: He is alert.         Assessment & Plan   Diagnoses and all orders for this visit:    1. Open wound of mouth, subsequent encounter (Primary)  -     Case Request; Standing  -     Comprehensive Metabolic Panel; Future  -     CBC and Differential; Future  -     ECG 12 Lead; Future  -     XR Chest 2 View; Future  -     Case Request    2. Closed fracture of right side of mandible, unspecified mandibular site, sequela  Comments:  history  Orders:  -     Case Request; Standing  -     Comprehensive Metabolic Panel; Future  -     CBC and Differential; Future  -     ECG 12 Lead; Future  -     XR Chest 2 View; Future  -     Case Request    Other orders  -     Follow Anesthesia Guidelines / Protocol; Future  -     Provide Patient With Instructions on NPO Status  -     Follow Anesthesia Guidelines / Protocol; Standing  -     Verify NPO Status; Standing  -     SCD (Sequential Compression Device) - To Be Placed on Patient in Pre-Op; Standing  -     Patient to Void Prior to Transfer to OR; Standing  -     Instructions for Nursing; Standing      REMOVAL OF MANDIBULAR PLATE (N/A)  Orders Placed This Encounter   Procedures    XR Chest 2 View     Standing Status:   Future     Expected Date:   7/22/2025      Expiration Date:   7/17/2026     Reason for Exam::   preop testing     Release to patient:   Routine Release [[id number]]    Comprehensive Metabolic Panel     Standing Status:   Future     Expected Date:   7/22/2025     Expiration Date:   7/17/2026     Release to patient:   Routine Release [[id number]]    Provide Patient With Instructions on NPO Status    ECG 12 Lead     Standing Status:   Future     Expected Date:   7/22/2025     Expiration Date:   7/17/2026     Reason for Exam::   preop testing     Release to patient:   Routine Release [[id number]]    CBC and Differential     Standing Status:   Future     Expected Date:   7/22/2025     Expiration Date:   7/17/2026     Manual Differential:   No     Release to patient:   Routine Release [[id number]]     Removal of mandibular plate: Risks and benefits discussed with patient including the risk of bleeding, risk of infection, and risks associated with anesthesia.  Alternatives were discussed. No guarantees were made or implied. Questions were asked appropriately answered.   Patient wishes to proceed.     Return for post op.       Patient Instructions   Removal of mandibular plate: Risks and benefits discussed with patient including the risk of bleeding, risk of infection, and risks associated with anesthesia.  Alternatives were discussed. No guarantees were made or implied. Questions were asked appropriately answered.   Patient wishes to proceed.     CONTACT INFORMATION:  The main office phone number is [phone]. For emergencies after hours and on weekends, this number will convert over to our answering service and the on call provider will answer. Please try to keep non emergent phone calls/ questions to office hours 9am-5pm Monday through Friday.      [employer]  As an alternative, you can sign up and use the Epic MyChart system for more direct and quicker access for non emergent questions/ problems.  [employer] allows you to send messages to your  doctor, view your test results, renew your prescriptions, schedule appointments, and more. To sign up, go to [email or web address]] and click on the Sign Up Now link in the New User? box. Enter your [employer] Activation Code exactly as it appears below along with the last four digits of your Social Security Number and your Date of Birth () to complete the sign-up process. If you do not sign up before the expiration date, you must request a new code.     [employer] Activation Code: Activation code not generated  Current [employer] Status: Active     If you have questions, you can email [email or web address] or call [phone] to talk to our [employer] staff. Remember, [employer] is NOT to be used for urgent needs. For medical emergencies, dial 911.     IF YOU SMOKE OR USE TOBACCO PLEASE READ THE FOLLOWING:  Why is smoking bad for me?  Smoking increases the risk of heart disease, lung disease, vascular disease, stroke, and cancer. If you smoke, STOP!        IF YOU SMOKE OR USE TOBACCO PLEASE READ THE FOLLOWING:  Why is smoking bad for me?  Smoking increases the risk of heart disease, lung disease, vascular disease, stroke, and cancer. If you smoke, STOP!     For more information:  Quit Now [facility]  -QUIT-NOW  [email or web address]

## 2025-07-17 NOTE — PATIENT INSTRUCTIONS
Removal of mandibular plate: Risks and benefits discussed with patient including the risk of bleeding, risk of infection, and risks associated with anesthesia.  Alternatives were discussed. No guarantees were made or implied. Questions were asked appropriately answered.   Patient wishes to proceed.     CONTACT INFORMATION:  The main office phone number is [phone]. For emergencies after hours and on weekends, this number will convert over to our answering service and the on call provider will answer. Please try to keep non emergent phone calls/ questions to office hours 9am-5pm Monday through Friday.      [employer]  As an alternative, you can sign up and use the Epic MyChart system for more direct and quicker access for non emergent questions/ problems.  [employer] allows you to send messages to your doctor, view your test results, renew your prescriptions, schedule appointments, and more. To sign up, go to [employer] and click on the Sign Up Now link in the New User? box. Enter your [employer] Activation Code exactly as it appears below along with the last four digits of your Social Security Number and your Date of Birth () to complete the sign-up process. If you do not sign up before the expiration date, you must request a new code.     [employer] Activation Code: Activation code not generated  Current [employer] Status: Active     If you have questions, you can email [email or web address] or call [phone] to talk to our [employer] staff. Remember, [employer] is NOT to be used for urgent needs. For medical emergencies, dial 911.     IF YOU SMOKE OR USE TOBACCO PLEASE READ THE FOLLOWING:  Why is smoking bad for me?  Smoking increases the risk of heart disease, lung disease, vascular disease, stroke, and cancer. If you smoke, STOP!        IF YOU SMOKE OR USE TOBACCO PLEASE READ THE FOLLOWING:  Why is smoking bad for me?  Smoking increases the risk of heart disease, lung disease, vascular  disease, stroke, and cancer. If you smoke, STOP!     For more information:  Quit Now [place]  1-800-QUIT-NOW  [email or web address]://[email or web address].[email or web address]/en-[email or web address]

## 2025-07-21 ENCOUNTER — LAB (OUTPATIENT)
Dept: LAB | Facility: HOSPITAL | Age: 80
End: 2025-07-21
Payer: MEDICARE

## 2025-07-21 ENCOUNTER — APPOINTMENT (OUTPATIENT)
Dept: LAB | Facility: HOSPITAL | Age: 80
End: 2025-07-21
Payer: MEDICARE

## 2025-07-21 ENCOUNTER — INFUSION (OUTPATIENT)
Dept: ONCOLOGY | Facility: HOSPITAL | Age: 80
End: 2025-07-21
Payer: MEDICARE

## 2025-07-21 VITALS
HEIGHT: 71 IN | BODY MASS INDEX: 28.28 KG/M2 | WEIGHT: 202 LBS | OXYGEN SATURATION: 100 % | TEMPERATURE: 96.9 F | DIASTOLIC BLOOD PRESSURE: 66 MMHG | HEART RATE: 79 BPM | SYSTOLIC BLOOD PRESSURE: 129 MMHG | RESPIRATION RATE: 18 BRPM

## 2025-07-21 DIAGNOSIS — S02.609S: ICD-10-CM

## 2025-07-21 DIAGNOSIS — C61 PROSTATE CANCER: ICD-10-CM

## 2025-07-21 DIAGNOSIS — S01.502D: ICD-10-CM

## 2025-07-21 DIAGNOSIS — C61 PROSTATE CANCER: Primary | ICD-10-CM

## 2025-07-21 LAB
ALBUMIN SERPL-MCNC: 3.7 G/DL (ref 3.5–5.2)
ALBUMIN/GLOB SERPL: 1.7 G/DL
ALP SERPL-CCNC: 54 U/L (ref 39–117)
ALT SERPL W P-5'-P-CCNC: 21 U/L (ref 1–41)
ANION GAP SERPL CALCULATED.3IONS-SCNC: 10 MMOL/L (ref 5–15)
AST SERPL-CCNC: 20 U/L (ref 1–40)
BASOPHILS # BLD AUTO: 0.01 10*3/MM3 (ref 0–0.2)
BASOPHILS NFR BLD AUTO: 0.2 % (ref 0–1.5)
BILIRUB SERPL-MCNC: 0.4 MG/DL (ref 0–1.2)
BUN SERPL-MCNC: 24.4 MG/DL (ref 8–23)
BUN/CREAT SERPL: 20.2 (ref 7–25)
CALCIUM SPEC-SCNC: 8.5 MG/DL (ref 8.6–10.5)
CHLORIDE SERPL-SCNC: 110 MMOL/L (ref 98–107)
CO2 SERPL-SCNC: 24 MMOL/L (ref 22–29)
CREAT SERPL-MCNC: 1.21 MG/DL (ref 0.76–1.27)
DEPRECATED RDW RBC AUTO: 49.9 FL (ref 37–54)
EGFRCR SERPLBLD CKD-EPI 2021: 60.2 ML/MIN/1.73
EOSINOPHIL # BLD AUTO: 0.05 10*3/MM3 (ref 0–0.4)
EOSINOPHIL NFR BLD AUTO: 0.8 % (ref 0.3–6.2)
ERYTHROCYTE [DISTWIDTH] IN BLOOD BY AUTOMATED COUNT: 13.7 % (ref 12.3–15.4)
GLOBULIN UR ELPH-MCNC: 2.2 GM/DL
GLUCOSE SERPL-MCNC: 85 MG/DL (ref 65–99)
HCT VFR BLD AUTO: 38.9 % (ref 37.5–51)
HGB BLD-MCNC: 12.6 G/DL (ref 13–17.7)
IMM GRANULOCYTES # BLD AUTO: 0.02 10*3/MM3 (ref 0–0.05)
IMM GRANULOCYTES NFR BLD AUTO: 0.3 % (ref 0–0.5)
LYMPHOCYTES # BLD AUTO: 1.27 10*3/MM3 (ref 0.7–3.1)
LYMPHOCYTES NFR BLD AUTO: 21 % (ref 19.6–45.3)
MAGNESIUM SERPL-MCNC: 1.6 MG/DL (ref 1.6–2.4)
MCH RBC QN AUTO: 32.1 PG (ref 26.6–33)
MCHC RBC AUTO-ENTMCNC: 32.4 G/DL (ref 31.5–35.7)
MCV RBC AUTO: 99.2 FL (ref 79–97)
MONOCYTES # BLD AUTO: 0.3 10*3/MM3 (ref 0.1–0.9)
MONOCYTES NFR BLD AUTO: 5 % (ref 5–12)
NEUTROPHILS NFR BLD AUTO: 4.41 10*3/MM3 (ref 1.7–7)
NEUTROPHILS NFR BLD AUTO: 72.7 % (ref 42.7–76)
PHOSPHATE SERPL-MCNC: 3.8 MG/DL (ref 2.5–4.5)
PLATELET # BLD AUTO: 133 10*3/MM3 (ref 140–450)
PMV BLD AUTO: 9.9 FL (ref 6–12)
POTASSIUM SERPL-SCNC: 4.5 MMOL/L (ref 3.5–5.2)
PROT SERPL-MCNC: 5.9 G/DL (ref 6–8.5)
RBC # BLD AUTO: 3.92 10*6/MM3 (ref 4.14–5.8)
SODIUM SERPL-SCNC: 144 MMOL/L (ref 136–145)
WBC NRBC COR # BLD AUTO: 6.06 10*3/MM3 (ref 3.4–10.8)

## 2025-07-21 PROCEDURE — 80053 COMPREHEN METABOLIC PANEL: CPT

## 2025-07-21 PROCEDURE — 84100 ASSAY OF PHOSPHORUS: CPT

## 2025-07-21 PROCEDURE — 85025 COMPLETE CBC W/AUTO DIFF WBC: CPT

## 2025-07-21 PROCEDURE — 83735 ASSAY OF MAGNESIUM: CPT

## 2025-07-21 PROCEDURE — G0463 HOSPITAL OUTPT CLINIC VISIT: HCPCS

## 2025-07-21 PROCEDURE — 36415 COLL VENOUS BLD VENIPUNCTURE: CPT

## 2025-07-21 NOTE — PROGRESS NOTES
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 Miles), he will not have to have another one put in.  Ok to proceed with xgeva?    Per Dr. Katie marinelli today and for 3 months post procedure.  Pt informed and agreeable discharged in stable condition.  Scheduling to change pt future appt's.

## 2025-07-22 ENCOUNTER — TELEPHONE (OUTPATIENT)
Dept: ONCOLOGY | Facility: CLINIC | Age: 80
End: 2025-07-22
Payer: MEDICARE

## 2025-07-22 NOTE — TELEPHONE ENCOUNTER
García stopped by the office. He was here yesterday for Xgeva shot but it was held. He reported to his nurse that he has a metal plate in his lower right jaw that he is planning to have removed next month. He was advised that xgeva would need to be held for 3 months following the procedure.     He is concerned with this because he feels the xgeva is more important than having the plate removed. He reports that it is not causing him any problems, infection or pain but the gum is  from it slightly. He is not able to put in the bottom plate to eat. He said this has been going on for a long time. I discussed with him that xgeva can cause osteonecrosis of the Jaw and that Dr. Santa may want him to go ahead and get the plate removed and hold for 3 months before resuming xgeva. I let him know that Dr. Vazquez is out of the office this week but I would send him a message and call him with Dr. Santa's recommendation.

## 2025-07-28 ENCOUNTER — OFFICE VISIT (OUTPATIENT)
Dept: UROLOGY | Facility: CLINIC | Age: 80
End: 2025-07-28
Payer: MEDICARE

## 2025-07-28 VITALS — TEMPERATURE: 97 F | WEIGHT: 203.6 LBS | BODY MASS INDEX: 28.5 KG/M2 | HEIGHT: 71 IN

## 2025-07-28 DIAGNOSIS — C61 PROSTATE CANCER: Primary | ICD-10-CM

## 2025-07-28 DIAGNOSIS — R33.9 URINARY RETENTION: ICD-10-CM

## 2025-07-28 DIAGNOSIS — N13.8 BPH WITH URINARY OBSTRUCTION: ICD-10-CM

## 2025-07-28 DIAGNOSIS — N40.1 BPH WITH URINARY OBSTRUCTION: ICD-10-CM

## 2025-07-28 PROCEDURE — 1159F MED LIST DOCD IN RCRD: CPT | Performed by: UROLOGY

## 2025-07-28 PROCEDURE — 99214 OFFICE O/P EST MOD 30 MIN: CPT | Performed by: UROLOGY

## 2025-07-28 PROCEDURE — 1160F RVW MEDS BY RX/DR IN RCRD: CPT | Performed by: UROLOGY

## 2025-07-28 NOTE — PROGRESS NOTES
Francisoc Sinclair is here today for catheter change.  Patient is seen by Dr. Pat.  The old catheter was removed without difficulty. Using sterile technique the patient was prepped with Hibiclens and new 20 fr coude bag. Patient tolerated the procedure well.  Dr. Pat was in the office at the time of procedure. I have reviewed and agree with medical assistance documentation above. Andressa ARRIETA MA

## 2025-07-31 ENCOUNTER — OFFICE VISIT (OUTPATIENT)
Dept: CARDIOLOGY CLINIC | Age: 80
End: 2025-07-31
Payer: MEDICARE

## 2025-07-31 VITALS
DIASTOLIC BLOOD PRESSURE: 80 MMHG | SYSTOLIC BLOOD PRESSURE: 118 MMHG | HEIGHT: 71 IN | HEART RATE: 64 BPM | WEIGHT: 202 LBS | BODY MASS INDEX: 28.28 KG/M2

## 2025-07-31 DIAGNOSIS — I25.10 CORONARY ARTERY DISEASE INVOLVING NATIVE CORONARY ARTERY OF NATIVE HEART WITHOUT ANGINA PECTORIS: ICD-10-CM

## 2025-07-31 DIAGNOSIS — R00.2 PALPITATIONS: Primary | ICD-10-CM

## 2025-07-31 DIAGNOSIS — I50.22 CHRONIC SYSTOLIC CONGESTIVE HEART FAILURE, NYHA CLASS 2 (HCC): ICD-10-CM

## 2025-07-31 DIAGNOSIS — I25.5 ISCHEMIC CARDIOMYOPATHY: ICD-10-CM

## 2025-07-31 DIAGNOSIS — I25.10 CORONARY ARTERY DISEASE INVOLVING NATIVE CORONARY ARTERY OF NATIVE HEART WITHOUT ANGINA PECTORIS: Primary | ICD-10-CM

## 2025-07-31 DIAGNOSIS — Z95.1 HX OF CABG: ICD-10-CM

## 2025-07-31 DIAGNOSIS — R06.02 SHORTNESS OF BREATH: ICD-10-CM

## 2025-07-31 LAB
ALBUMIN SERPL-MCNC: 4.2 G/DL (ref 3.5–5.2)
ALP SERPL-CCNC: 71 U/L (ref 40–129)
ALT SERPL-CCNC: 20 U/L (ref 10–50)
ANION GAP SERPL CALCULATED.3IONS-SCNC: 10 MMOL/L (ref 8–16)
AST SERPL-CCNC: 20 U/L (ref 10–50)
BASOPHILS # BLD: 0 K/UL (ref 0–0.2)
BASOPHILS NFR BLD: 0.2 % (ref 0–1)
BILIRUB SERPL-MCNC: 0.5 MG/DL (ref 0.2–1.2)
BNP BLD-MCNC: 377 PG/ML (ref 0–449)
BUN SERPL-MCNC: 30 MG/DL (ref 8–23)
CALCIUM SERPL-MCNC: 8.8 MG/DL (ref 8.8–10.2)
CHLORIDE SERPL-SCNC: 107 MMOL/L (ref 98–107)
CO2 SERPL-SCNC: 27 MMOL/L (ref 22–29)
CREAT SERPL-MCNC: 1.2 MG/DL (ref 0.7–1.2)
EOSINOPHIL # BLD: 0.1 K/UL (ref 0–0.6)
EOSINOPHIL NFR BLD: 1.2 % (ref 0–5)
ERYTHROCYTE [DISTWIDTH] IN BLOOD BY AUTOMATED COUNT: 13.3 % (ref 11.5–14.5)
GLUCOSE SERPL-MCNC: 82 MG/DL (ref 70–99)
HCT VFR BLD AUTO: 40.2 % (ref 42–52)
HGB BLD-MCNC: 13 G/DL (ref 14–18)
IMM GRANULOCYTES # BLD: 0 K/UL
LYMPHOCYTES # BLD: 1.3 K/UL (ref 1.1–4.5)
LYMPHOCYTES NFR BLD: 19.5 % (ref 20–40)
MCH RBC QN AUTO: 32.4 PG (ref 27–31)
MCHC RBC AUTO-ENTMCNC: 32.3 G/DL (ref 33–37)
MCV RBC AUTO: 100.2 FL (ref 80–94)
MONOCYTES # BLD: 0.5 K/UL (ref 0–0.9)
MONOCYTES NFR BLD: 7.2 % (ref 0–10)
NEUTROPHILS # BLD: 4.7 K/UL (ref 1.5–7.5)
NEUTS SEG NFR BLD: 71.4 % (ref 50–65)
PLATELET # BLD AUTO: 147 K/UL (ref 130–400)
PMV BLD AUTO: 10.8 FL (ref 9.4–12.4)
POTASSIUM SERPL-SCNC: 4.6 MMOL/L (ref 3.5–5.1)
PROT SERPL-MCNC: 6.5 G/DL (ref 6.4–8.3)
RBC # BLD AUTO: 4.01 M/UL (ref 4.7–6.1)
SODIUM SERPL-SCNC: 144 MMOL/L (ref 136–145)
WBC # BLD AUTO: 6.5 K/UL (ref 4.8–10.8)

## 2025-07-31 PROCEDURE — 1123F ACP DISCUSS/DSCN MKR DOCD: CPT | Performed by: INTERNAL MEDICINE

## 2025-07-31 PROCEDURE — 3079F DIAST BP 80-89 MM HG: CPT | Performed by: INTERNAL MEDICINE

## 2025-07-31 PROCEDURE — 1036F TOBACCO NON-USER: CPT | Performed by: INTERNAL MEDICINE

## 2025-07-31 PROCEDURE — 1159F MED LIST DOCD IN RCRD: CPT | Performed by: INTERNAL MEDICINE

## 2025-07-31 PROCEDURE — G8427 DOCREV CUR MEDS BY ELIG CLIN: HCPCS | Performed by: INTERNAL MEDICINE

## 2025-07-31 PROCEDURE — G8417 CALC BMI ABV UP PARAM F/U: HCPCS | Performed by: INTERNAL MEDICINE

## 2025-07-31 PROCEDURE — 99214 OFFICE O/P EST MOD 30 MIN: CPT | Performed by: INTERNAL MEDICINE

## 2025-07-31 PROCEDURE — 3074F SYST BP LT 130 MM HG: CPT | Performed by: INTERNAL MEDICINE

## 2025-07-31 PROCEDURE — 93000 ELECTROCARDIOGRAM COMPLETE: CPT | Performed by: INTERNAL MEDICINE

## 2025-07-31 RX ORDER — ABIRATERONE ACETATE 250 MG/1
1000 TABLET ORAL DAILY
COMMUNITY
Start: 2025-05-20

## 2025-07-31 RX ORDER — LISINOPRIL 20 MG/1
10 TABLET ORAL DAILY
COMMUNITY

## 2025-07-31 RX ORDER — RANOLAZINE 1000 MG/1
1000 TABLET, EXTENDED RELEASE ORAL 2 TIMES DAILY
COMMUNITY

## 2025-07-31 ASSESSMENT — ENCOUNTER SYMPTOMS
GASTROINTESTINAL NEGATIVE: 1
DIARRHEA: 0
RESPIRATORY NEGATIVE: 1
EYES NEGATIVE: 1
NAUSEA: 0
SHORTNESS OF BREATH: 0
VOMITING: 0

## 2025-07-31 NOTE — PROGRESS NOTES
Physical Signs of Abuse Present: no   Housing Stability: Unknown (12/20/2024)    Received from [facility]    Housing Stability     Current Living Arrangements: home     Potentially Unsafe Housing Conditions: Not on file       Physical Examination:  /80   Pulse 64   Ht 1.803 m (5' 11\")   Wt 91.6 kg (202 lb)   BMI 28.17 kg/m²   Physical Exam  Vitals reviewed.   Constitutional:       Appearance: He is well-developed.   Neck:      Vascular: No carotid bruit or JVD.   Cardiovascular:      Rate and Rhythm: Normal rate and regular rhythm.      Heart sounds: Normal heart sounds. No murmur heard.     No friction rub. No gallop.   Pulmonary:      Effort: Pulmonary effort is normal. No respiratory distress.      Breath sounds: Normal breath sounds. No wheezing or rales.   Abdominal:      General: There is no distension.      Tenderness: There is no abdominal tenderness.   Lymphadenopathy:      Cervical: No cervical adenopathy.   Skin:     General: Skin is warm and dry.             ASSESSMENT:     Diagnosis Orders   1. Palpitations  EKG 12 lead      2. Coronary artery disease involving native coronary artery of native heart without angina pectoris        3. Hx of CABG        4. Chronic systolic congestive heart failure, NYHA class 2 (HCC)        5. Ischemic cardiomyopathy        6. Shortness of breath  Echo (TTE) complete (PRN contrast/bubble/strain/3D)          PLAN:  Orders Placed This Encounter   Procedures    EKG 12 lead     No orders of the defined types were placed in this encounter.        Continue Nitrostat 0.4 mg sublingual.  Entresto 24/26 p.o. twice daily  Spironolactone 25 mg a day  Ranolazine 500 mg p.o. twice daily  Atorvastatin 80 mg a day  Aspirin 81 mg a day  Amlodipine 5 mg a day  Coreg 12.5 mg taken at 6.25 mg daily suggest taking this twice daily not at the same time as Entresto  Follow-up assessment in 6 months  Suggest repeating echocardiogram  Comprehensive metabolic panel

## 2025-07-31 NOTE — PATIENT INSTRUCTIONS
Increase Carvedilol to half a pill twice daily (6.52 mg)  ECHO   CBC and CMP today at the lab [address]

## 2025-08-01 ENCOUNTER — HOSPITAL ENCOUNTER (OUTPATIENT)
Dept: GENERAL RADIOLOGY | Facility: HOSPITAL | Age: 80
Discharge: HOME | End: 2025-08-01
Payer: MEDICARE

## 2025-08-01 ENCOUNTER — PRE-ADMISSION TESTING (OUTPATIENT)
Dept: PREADMISSION TESTING | Facility: HOSPITAL | Age: 80
End: 2025-08-01
Payer: MEDICARE

## 2025-08-01 VITALS
WEIGHT: 203.48 LBS | HEIGHT: 69 IN | RESPIRATION RATE: 18 BRPM | OXYGEN SATURATION: 100 % | SYSTOLIC BLOOD PRESSURE: 154 MMHG | DIASTOLIC BLOOD PRESSURE: 81 MMHG | BODY MASS INDEX: 30.14 KG/M2 | HEART RATE: 82 BPM

## 2025-08-01 DIAGNOSIS — S01.502D: ICD-10-CM

## 2025-08-01 DIAGNOSIS — S02.609S: ICD-10-CM

## 2025-08-01 PROCEDURE — 71046 X-RAY EXAM CHEST 2 VIEWS: CPT

## 2025-08-01 PROCEDURE — 93005 ELECTROCARDIOGRAM TRACING: CPT

## 2025-08-01 RX ORDER — RANOLAZINE 1000 MG/1
1 TABLET, EXTENDED RELEASE ORAL 2 TIMES DAILY
COMMUNITY

## 2025-08-01 NOTE — DISCHARGE INSTRUCTIONS

## 2025-08-03 LAB
QT INTERVAL: 372 MS
QTC INTERVAL: 426 MS

## 2025-08-04 ENCOUNTER — TELEPHONE (OUTPATIENT)
Dept: CARDIOLOGY CLINIC | Age: 80
End: 2025-08-04

## 2025-08-04 ENCOUNTER — RESULTS FOLLOW-UP (OUTPATIENT)
Dept: OTOLARYNGOLOGY | Facility: CLINIC | Age: 80
End: 2025-08-04
Payer: MEDICARE

## 2025-08-06 ENCOUNTER — TELEPHONE (OUTPATIENT)
Dept: OTOLARYNGOLOGY | Facility: CLINIC | Age: 80
End: 2025-08-06
Payer: MEDICARE

## 2025-08-07 ENCOUNTER — TELEPHONE (OUTPATIENT)
Dept: OTOLARYNGOLOGY | Facility: CLINIC | Age: 80
End: 2025-08-07
Payer: MEDICARE

## 2025-08-08 ENCOUNTER — ANESTHESIA EVENT (OUTPATIENT)
Dept: PERIOP | Facility: HOSPITAL | Age: 80
End: 2025-08-08
Payer: MEDICARE

## 2025-08-08 ENCOUNTER — HOSPITAL ENCOUNTER (OUTPATIENT)
Facility: HOSPITAL | Age: 80
Setting detail: HOSPITAL OUTPATIENT SURGERY
Discharge: HOME | End: 2025-08-08
Attending: OTOLARYNGOLOGY | Admitting: OTOLARYNGOLOGY
Payer: MEDICARE

## 2025-08-08 ENCOUNTER — ANESTHESIA (OUTPATIENT)
Dept: PERIOP | Facility: HOSPITAL | Age: 80
End: 2025-08-08
Payer: MEDICARE

## 2025-08-08 VITALS
SYSTOLIC BLOOD PRESSURE: 145 MMHG | TEMPERATURE: 97.4 F | OXYGEN SATURATION: 97 % | DIASTOLIC BLOOD PRESSURE: 87 MMHG | HEART RATE: 82 BPM | RESPIRATION RATE: 16 BRPM

## 2025-08-08 DIAGNOSIS — S01.502D: ICD-10-CM

## 2025-08-08 DIAGNOSIS — S02.609S: ICD-10-CM

## 2025-08-08 PROCEDURE — 25010000002 LIDOCAINE PF 2% 2 % SOLUTION: Performed by: NURSE ANESTHETIST, CERTIFIED REGISTERED

## 2025-08-08 PROCEDURE — 25010000002 PROPOFOL 10 MG/ML EMULSION: Performed by: NURSE ANESTHETIST, CERTIFIED REGISTERED

## 2025-08-08 PROCEDURE — 20680 REMOVAL OF IMPLANT DEEP: CPT | Performed by: OTOLARYNGOLOGY

## 2025-08-08 PROCEDURE — 25810000003 LACTATED RINGERS PER 1000 ML: Performed by: OTOLARYNGOLOGY

## 2025-08-08 PROCEDURE — 25010000002 FENTANYL CITRATE (PF) 50 MCG/ML SOLUTION: Performed by: ANESTHESIOLOGY

## 2025-08-08 PROCEDURE — 25010000002 DEXAMETHASONE PER 1 MG: Performed by: NURSE ANESTHETIST, CERTIFIED REGISTERED

## 2025-08-08 PROCEDURE — 25010000002 LIDOCAINE 1% - EPINEPHRINE 1:100000 1 %-1:100000 SOLUTION: Performed by: OTOLARYNGOLOGY

## 2025-08-08 PROCEDURE — 88300 SURGICAL PATH GROSS: CPT | Performed by: OTOLARYNGOLOGY

## 2025-08-08 PROCEDURE — 25010000002 ONDANSETRON PER 1 MG: Performed by: NURSE ANESTHETIST, CERTIFIED REGISTERED

## 2025-08-08 RX ORDER — CHLORHEXIDINE GLUCONATE ORAL RINSE 1.2 MG/ML
SOLUTION DENTAL AS NEEDED
Status: DISCONTINUED | OUTPATIENT
Start: 2025-08-08 | End: 2025-08-08 | Stop reason: HOSPADM

## 2025-08-08 RX ORDER — HYDROCODONE BITARTRATE AND ACETAMINOPHEN 5; 325 MG/1; MG/1
1 TABLET ORAL ONCE AS NEEDED
Refills: 0 | Status: DISCONTINUED | OUTPATIENT
Start: 2025-08-08 | End: 2025-08-08 | Stop reason: HOSPADM

## 2025-08-08 RX ORDER — HYDROCODONE BITARTRATE AND ACETAMINOPHEN 5; 325 MG/1; MG/1
1 TABLET ORAL EVERY 4 HOURS PRN
Status: DISCONTINUED | OUTPATIENT
Start: 2025-08-08 | End: 2025-08-08 | Stop reason: HOSPADM

## 2025-08-08 RX ORDER — PROPOFOL 10 MG/ML
VIAL (ML) INTRAVENOUS AS NEEDED
Status: DISCONTINUED | OUTPATIENT
Start: 2025-08-08 | End: 2025-08-08 | Stop reason: SURG

## 2025-08-08 RX ORDER — DEXAMETHASONE SODIUM PHOSPHATE 4 MG/ML
INJECTION, SOLUTION INTRA-ARTICULAR; INTRALESIONAL; INTRAMUSCULAR; INTRAVENOUS; SOFT TISSUE AS NEEDED
Status: DISCONTINUED | OUTPATIENT
Start: 2025-08-08 | End: 2025-08-08 | Stop reason: SURG

## 2025-08-08 RX ORDER — FLUMAZENIL 0.1 MG/ML
0.2 INJECTION INTRAVENOUS AS NEEDED
Status: DISCONTINUED | OUTPATIENT
Start: 2025-08-08 | End: 2025-08-08 | Stop reason: HOSPADM

## 2025-08-08 RX ORDER — SODIUM CHLORIDE, SODIUM LACTATE, POTASSIUM CHLORIDE, CALCIUM CHLORIDE 600; 310; 30; 20 MG/100ML; MG/100ML; MG/100ML; MG/100ML
1000 INJECTION, SOLUTION INTRAVENOUS CONTINUOUS
Status: DISCONTINUED | OUTPATIENT
Start: 2025-08-08 | End: 2025-08-08 | Stop reason: HOSPADM

## 2025-08-08 RX ORDER — ONDANSETRON 4 MG/1
4 TABLET, ORALLY DISINTEGRATING ORAL ONCE AS NEEDED
Status: DISCONTINUED | OUTPATIENT
Start: 2025-08-08 | End: 2025-08-08 | Stop reason: HOSPADM

## 2025-08-08 RX ORDER — FENTANYL CITRATE 50 UG/ML
50 INJECTION, SOLUTION INTRAMUSCULAR; INTRAVENOUS
Status: DISCONTINUED | OUTPATIENT
Start: 2025-08-08 | End: 2025-08-08 | Stop reason: HOSPADM

## 2025-08-08 RX ORDER — SODIUM CHLORIDE 0.9 % (FLUSH) 0.9 %
3 SYRINGE (ML) INJECTION AS NEEDED
Status: DISCONTINUED | OUTPATIENT
Start: 2025-08-08 | End: 2025-08-08 | Stop reason: HOSPADM

## 2025-08-08 RX ORDER — HYDROCODONE BITARTRATE AND ACETAMINOPHEN 10; 325 MG/1; MG/1
1 TABLET ORAL EVERY 4 HOURS PRN
Status: DISCONTINUED | OUTPATIENT
Start: 2025-08-08 | End: 2025-08-08 | Stop reason: HOSPADM

## 2025-08-08 RX ORDER — MUPIROCIN 2 %
OINTMENT (GRAM) TOPICAL EVERY 8 HOURS SCHEDULED
Status: DISCONTINUED | OUTPATIENT
Start: 2025-08-08 | End: 2025-08-08 | Stop reason: HOSPADM

## 2025-08-08 RX ORDER — LIDOCAINE HYDROCHLORIDE 10 MG/ML
0.5 INJECTION, SOLUTION EPIDURAL; INFILTRATION; INTRACAUDAL; PERINEURAL ONCE AS NEEDED
Status: DISCONTINUED | OUTPATIENT
Start: 2025-08-08 | End: 2025-08-08 | Stop reason: HOSPADM

## 2025-08-08 RX ORDER — LIDOCAINE HYDROCHLORIDE AND EPINEPHRINE 10; 10 MG/ML; UG/ML
INJECTION, SOLUTION INFILTRATION; PERINEURAL AS NEEDED
Status: DISCONTINUED | OUTPATIENT
Start: 2025-08-08 | End: 2025-08-08 | Stop reason: HOSPADM

## 2025-08-08 RX ORDER — ONDANSETRON 2 MG/ML
INJECTION INTRAMUSCULAR; INTRAVENOUS AS NEEDED
Status: DISCONTINUED | OUTPATIENT
Start: 2025-08-08 | End: 2025-08-08 | Stop reason: SURG

## 2025-08-08 RX ORDER — CHLORHEXIDINE GLUCONATE ORAL RINSE 1.2 MG/ML
15 SOLUTION DENTAL 2 TIMES DAILY
Qty: 300 ML | Refills: 0 | Status: SHIPPED | OUTPATIENT
Start: 2025-08-08 | End: 2025-08-18

## 2025-08-08 RX ORDER — LIDOCAINE HYDROCHLORIDE 20 MG/ML
INJECTION, SOLUTION EPIDURAL; INFILTRATION; INTRACAUDAL; PERINEURAL AS NEEDED
Status: DISCONTINUED | OUTPATIENT
Start: 2025-08-08 | End: 2025-08-08 | Stop reason: SURG

## 2025-08-08 RX ORDER — NALOXONE HCL 0.4 MG/ML
0.4 VIAL (ML) INJECTION AS NEEDED
Status: DISCONTINUED | OUTPATIENT
Start: 2025-08-08 | End: 2025-08-08 | Stop reason: HOSPADM

## 2025-08-08 RX ORDER — ONDANSETRON 2 MG/ML
4 INJECTION INTRAMUSCULAR; INTRAVENOUS ONCE AS NEEDED
Status: DISCONTINUED | OUTPATIENT
Start: 2025-08-08 | End: 2025-08-08 | Stop reason: HOSPADM

## 2025-08-08 RX ORDER — LABETALOL HYDROCHLORIDE 5 MG/ML
5 INJECTION, SOLUTION INTRAVENOUS
Status: DISCONTINUED | OUTPATIENT
Start: 2025-08-08 | End: 2025-08-08 | Stop reason: HOSPADM

## 2025-08-08 RX ORDER — PHENYLEPHRINE HCL IN 0.9% NACL 1 MG/10 ML
SYRINGE (ML) INTRAVENOUS AS NEEDED
Status: DISCONTINUED | OUTPATIENT
Start: 2025-08-08 | End: 2025-08-08 | Stop reason: SURG

## 2025-08-08 RX ORDER — ROCURONIUM BROMIDE 10 MG/ML
INJECTION, SOLUTION INTRAVENOUS AS NEEDED
Status: DISCONTINUED | OUTPATIENT
Start: 2025-08-08 | End: 2025-08-08 | Stop reason: SURG

## 2025-08-08 RX ADMIN — HYDROCODONE BITARTRATE AND ACETAMINOPHEN 1 TABLET: 10; 325 TABLET ORAL at 13:50

## 2025-08-08 RX ADMIN — LIDOCAINE HYDROCHLORIDE 100 MG: 20 INJECTION, SOLUTION EPIDURAL; INFILTRATION; INTRACAUDAL; PERINEURAL at 12:00

## 2025-08-08 RX ADMIN — Medication 200 MCG: at 12:28

## 2025-08-08 RX ADMIN — SODIUM CHLORIDE, POTASSIUM CHLORIDE, SODIUM LACTATE AND CALCIUM CHLORIDE 1000 ML: 600; 310; 30; 20 INJECTION, SOLUTION INTRAVENOUS at 09:18

## 2025-08-08 RX ADMIN — Medication 200 MCG: at 12:40

## 2025-08-08 RX ADMIN — ONDANSETRON 4 MG: 2 INJECTION INTRAMUSCULAR; INTRAVENOUS at 12:49

## 2025-08-08 RX ADMIN — FENTANYL CITRATE 50 MCG: 50 INJECTION, SOLUTION INTRAMUSCULAR; INTRAVENOUS at 13:53

## 2025-08-08 RX ADMIN — PROPOFOL 120 MG: 10 INJECTION, EMULSION INTRAVENOUS at 12:00

## 2025-08-08 RX ADMIN — ROCURONIUM BROMIDE 30 MG: 10 INJECTION, SOLUTION INTRAVENOUS at 12:00

## 2025-08-08 RX ADMIN — Medication 200 MCG: at 12:46

## 2025-08-08 RX ADMIN — DEXAMETHASONE SODIUM PHOSPHATE 4 MG: 4 INJECTION, SOLUTION INTRA-ARTICULAR; INTRALESIONAL; INTRAMUSCULAR; INTRAVENOUS; SOFT TISSUE at 12:49

## 2025-08-12 LAB
LAB AP CASE REPORT: NORMAL
Lab: NORMAL
PATH REPORT.FINAL DX SPEC: NORMAL
PATH REPORT.GROSS SPEC: NORMAL

## 2025-08-18 ENCOUNTER — OFFICE VISIT (OUTPATIENT)
Dept: OTOLARYNGOLOGY | Facility: CLINIC | Age: 80
End: 2025-08-18
Payer: MEDICARE

## 2025-08-18 VITALS
SYSTOLIC BLOOD PRESSURE: 142 MMHG | WEIGHT: 203 LBS | HEIGHT: 69 IN | BODY MASS INDEX: 30.07 KG/M2 | DIASTOLIC BLOOD PRESSURE: 74 MMHG

## 2025-08-18 DIAGNOSIS — S01.502D: Primary | ICD-10-CM

## 2025-08-18 PROCEDURE — 99024 POSTOP FOLLOW-UP VISIT: CPT | Performed by: NURSE PRACTITIONER

## 2025-08-18 RX ORDER — DORZOLAMIDE HYDROCHLORIDE AND TIMOLOL MALEATE 20; 5 MG/ML; MG/ML
SOLUTION/ DROPS OPHTHALMIC
COMMUNITY
Start: 2025-05-20

## 2025-08-19 ENCOUNTER — SPECIALTY PHARMACY (OUTPATIENT)
Dept: ONCOLOGY | Facility: HOSPITAL | Age: 80
End: 2025-08-19
Payer: MEDICARE

## 2025-08-20 ENCOUNTER — OFFICE VISIT (OUTPATIENT)
Dept: UROLOGY | Facility: CLINIC | Age: 80
End: 2025-08-20
Payer: MEDICARE

## 2025-08-20 DIAGNOSIS — C61 PROSTATE CANCER: Primary | ICD-10-CM

## 2025-08-20 DIAGNOSIS — R33.9 URINARY RETENTION: ICD-10-CM

## 2025-08-20 DIAGNOSIS — N40.1 BPH WITH URINARY OBSTRUCTION: ICD-10-CM

## 2025-08-20 DIAGNOSIS — N13.8 BPH WITH URINARY OBSTRUCTION: ICD-10-CM

## 2025-08-21 ENCOUNTER — PREP FOR SURGERY (OUTPATIENT)
Dept: OTHER | Facility: HOSPITAL | Age: 80
End: 2025-08-21
Payer: MEDICARE

## 2025-08-21 ENCOUNTER — OFFICE VISIT (OUTPATIENT)
Dept: OTOLARYNGOLOGY | Facility: CLINIC | Age: 80
End: 2025-08-21
Payer: MEDICARE

## 2025-08-21 ENCOUNTER — TELEPHONE (OUTPATIENT)
Dept: UROLOGY | Facility: CLINIC | Age: 80
End: 2025-08-21
Payer: MEDICARE

## 2025-08-21 ENCOUNTER — PROCEDURE VISIT (OUTPATIENT)
Dept: UROLOGY | Facility: CLINIC | Age: 80
End: 2025-08-21
Payer: MEDICARE

## 2025-08-21 VITALS
SYSTOLIC BLOOD PRESSURE: 147 MMHG | WEIGHT: 203 LBS | BODY MASS INDEX: 30.07 KG/M2 | DIASTOLIC BLOOD PRESSURE: 97 MMHG | HEIGHT: 69 IN

## 2025-08-21 DIAGNOSIS — S02.609S: ICD-10-CM

## 2025-08-21 DIAGNOSIS — R33.9 URINARY RETENTION: Primary | ICD-10-CM

## 2025-08-21 DIAGNOSIS — S01.502D: Primary | ICD-10-CM

## 2025-08-21 RX ORDER — SODIUM CHLORIDE 0.9 % (FLUSH) 0.9 %
1-10 SYRINGE (ML) INJECTION AS NEEDED
OUTPATIENT
Start: 2025-08-21

## 2025-08-21 RX ORDER — SODIUM CHLORIDE 9 MG/ML
40 INJECTION, SOLUTION INTRAVENOUS AS NEEDED
OUTPATIENT
Start: 2025-08-21

## 2025-08-21 RX ORDER — SODIUM CHLORIDE 0.9 % (FLUSH) 0.9 %
10 SYRINGE (ML) INJECTION EVERY 12 HOURS SCHEDULED
OUTPATIENT
Start: 2025-08-21

## 2025-08-23 LAB — BACTERIA SPEC AEROBE CULT: ABNORMAL

## 2025-08-25 ENCOUNTER — RESULTS FOLLOW-UP (OUTPATIENT)
Dept: UROLOGY | Facility: CLINIC | Age: 80
End: 2025-08-25
Payer: MEDICARE

## 2025-08-25 DIAGNOSIS — R31.9 URINARY TRACT INFECTION WITH HEMATURIA, SITE UNSPECIFIED: Primary | ICD-10-CM

## 2025-08-25 DIAGNOSIS — N39.0 URINARY TRACT INFECTION WITH HEMATURIA, SITE UNSPECIFIED: Primary | ICD-10-CM

## 2025-08-25 RX ORDER — CEPHALEXIN 500 MG/1
500 CAPSULE ORAL 3 TIMES DAILY
Qty: 21 CAPSULE | Refills: 0 | Status: ON HOLD | OUTPATIENT
Start: 2025-08-25 | End: 2025-08-28

## 2025-08-26 ENCOUNTER — TELEPHONE (OUTPATIENT)
Dept: UROLOGY | Facility: CLINIC | Age: 80
End: 2025-08-26
Payer: MEDICARE

## 2025-08-28 ENCOUNTER — ANESTHESIA EVENT (OUTPATIENT)
Dept: PERIOP | Facility: HOSPITAL | Age: 80
End: 2025-08-28
Payer: MEDICARE

## 2025-08-28 ENCOUNTER — ANESTHESIA (OUTPATIENT)
Dept: PERIOP | Facility: HOSPITAL | Age: 80
End: 2025-08-28
Payer: MEDICARE

## 2025-08-28 ENCOUNTER — HOSPITAL ENCOUNTER (OUTPATIENT)
Facility: HOSPITAL | Age: 80
Discharge: HOME | End: 2025-08-29
Attending: UROLOGY | Admitting: UROLOGY
Payer: MEDICARE

## 2025-08-28 DIAGNOSIS — R33.9 URINARY RETENTION: ICD-10-CM

## 2025-08-28 PROBLEM — N40.1 BPH WITH URINARY OBSTRUCTION: Status: ACTIVE | Noted: 2025-08-28

## 2025-08-28 PROBLEM — N13.8 BPH WITH URINARY OBSTRUCTION: Status: ACTIVE | Noted: 2025-08-28

## 2025-08-28 LAB
BASOPHILS # BLD AUTO: 0.01 10*3/MM3 (ref 0–0.2)
BASOPHILS NFR BLD AUTO: 0.3 % (ref 0–1.5)
DEPRECATED RDW RBC AUTO: 46.6 FL (ref 37–54)
EOSINOPHIL # BLD AUTO: 0.04 10*3/MM3 (ref 0–0.4)
EOSINOPHIL NFR BLD AUTO: 1.1 % (ref 0.3–6.2)
ERYTHROCYTE [DISTWIDTH] IN BLOOD BY AUTOMATED COUNT: 13.2 % (ref 12.3–15.4)
HCT VFR BLD AUTO: 31.5 % (ref 37.5–51)
HGB BLD-MCNC: 10.3 G/DL (ref 13–17.7)
IMM GRANULOCYTES # BLD AUTO: 0.01 10*3/MM3 (ref 0–0.05)
IMM GRANULOCYTES NFR BLD AUTO: 0.3 % (ref 0–0.5)
LYMPHOCYTES # BLD AUTO: 0.74 10*3/MM3 (ref 0.7–3.1)
LYMPHOCYTES NFR BLD AUTO: 20.2 % (ref 19.6–45.3)
MCH RBC QN AUTO: 31.5 PG (ref 26.6–33)
MCHC RBC AUTO-ENTMCNC: 32.7 G/DL (ref 31.5–35.7)
MCV RBC AUTO: 96.3 FL (ref 79–97)
MONOCYTES # BLD AUTO: 0.25 10*3/MM3 (ref 0.1–0.9)
MONOCYTES NFR BLD AUTO: 6.8 % (ref 5–12)
NEUTROPHILS NFR BLD AUTO: 2.62 10*3/MM3 (ref 1.7–7)
NEUTROPHILS NFR BLD AUTO: 71.3 % (ref 42.7–76)
NRBC BLD AUTO-RTO: 0 /100 WBC (ref 0–0.2)
PLATELET # BLD AUTO: 130 10*3/MM3 (ref 140–450)
PMV BLD AUTO: 10.2 FL (ref 6–12)
RBC # BLD AUTO: 3.27 10*6/MM3 (ref 4.14–5.8)
WBC NRBC COR # BLD AUTO: 3.67 10*3/MM3 (ref 3.4–10.8)

## 2025-08-28 PROCEDURE — 25010000002 LIDOCAINE PF 2% 2 % SOLUTION: Performed by: NURSE ANESTHETIST, CERTIFIED REGISTERED

## 2025-08-28 PROCEDURE — 25010000002 ONDANSETRON PER 1 MG: Performed by: NURSE ANESTHETIST, CERTIFIED REGISTERED

## 2025-08-28 PROCEDURE — 25010000002 PROPOFOL 10 MG/ML EMULSION: Performed by: NURSE ANESTHETIST, CERTIFIED REGISTERED

## 2025-08-28 PROCEDURE — 25810000003 LACTATED RINGERS PER 1000 ML: Performed by: UROLOGY

## 2025-08-28 PROCEDURE — 25010000002 SUGAMMADEX 200 MG/2ML SOLUTION: Performed by: NURSE ANESTHETIST, CERTIFIED REGISTERED

## 2025-08-28 PROCEDURE — 85025 COMPLETE CBC W/AUTO DIFF WBC: CPT | Performed by: UROLOGY

## 2025-08-28 PROCEDURE — 25010000002 CEFAZOLIN PER 500 MG: Performed by: UROLOGY

## 2025-08-28 PROCEDURE — 25010000002 DEXAMETHASONE PER 1 MG: Performed by: NURSE ANESTHETIST, CERTIFIED REGISTERED

## 2025-08-28 PROCEDURE — 25010000002 FENTANYL CITRATE (PF) 100 MCG/2ML SOLUTION: Performed by: NURSE ANESTHETIST, CERTIFIED REGISTERED

## 2025-08-28 RX ORDER — LABETALOL HYDROCHLORIDE 5 MG/ML
5 INJECTION, SOLUTION INTRAVENOUS
Status: DISCONTINUED | OUTPATIENT
Start: 2025-08-28 | End: 2025-08-28 | Stop reason: HOSPADM

## 2025-08-28 RX ORDER — SODIUM CHLORIDE 0.9 % (FLUSH) 0.9 %
10 SYRINGE (ML) INJECTION EVERY 12 HOURS SCHEDULED
Status: DISCONTINUED | OUTPATIENT
Start: 2025-08-28 | End: 2025-08-28 | Stop reason: HOSPADM

## 2025-08-28 RX ORDER — SODIUM CHLORIDE, SODIUM LACTATE, POTASSIUM CHLORIDE, CALCIUM CHLORIDE 600; 310; 30; 20 MG/100ML; MG/100ML; MG/100ML; MG/100ML
1000 INJECTION, SOLUTION INTRAVENOUS CONTINUOUS
Status: DISCONTINUED | OUTPATIENT
Start: 2025-08-28 | End: 2025-08-28

## 2025-08-28 RX ORDER — DEXAMETHASONE SODIUM PHOSPHATE 4 MG/ML
INJECTION, SOLUTION INTRA-ARTICULAR; INTRALESIONAL; INTRAMUSCULAR; INTRAVENOUS; SOFT TISSUE AS NEEDED
Status: DISCONTINUED | OUTPATIENT
Start: 2025-08-28 | End: 2025-08-28 | Stop reason: SURG

## 2025-08-28 RX ORDER — DEXTROSE MONOHYDRATE 25 G/50ML
12.5 INJECTION, SOLUTION INTRAVENOUS AS NEEDED
Status: DISCONTINUED | OUTPATIENT
Start: 2025-08-28 | End: 2025-08-28 | Stop reason: HOSPADM

## 2025-08-28 RX ORDER — ONDANSETRON 2 MG/ML
INJECTION INTRAMUSCULAR; INTRAVENOUS AS NEEDED
Status: DISCONTINUED | OUTPATIENT
Start: 2025-08-28 | End: 2025-08-28 | Stop reason: SURG

## 2025-08-28 RX ORDER — LIDOCAINE HYDROCHLORIDE 10 MG/ML
0.5 INJECTION, SOLUTION EPIDURAL; INFILTRATION; INTRACAUDAL; PERINEURAL ONCE AS NEEDED
Status: DISCONTINUED | OUTPATIENT
Start: 2025-08-28 | End: 2025-08-28 | Stop reason: HOSPADM

## 2025-08-28 RX ORDER — FENTANYL CITRATE 50 UG/ML
25 INJECTION, SOLUTION INTRAMUSCULAR; INTRAVENOUS
Status: DISCONTINUED | OUTPATIENT
Start: 2025-08-28 | End: 2025-08-28 | Stop reason: HOSPADM

## 2025-08-28 RX ORDER — DROPERIDOL 2.5 MG/ML
0.62 INJECTION, SOLUTION INTRAMUSCULAR; INTRAVENOUS ONCE AS NEEDED
Status: DISCONTINUED | OUTPATIENT
Start: 2025-08-28 | End: 2025-08-28 | Stop reason: HOSPADM

## 2025-08-28 RX ORDER — AMLODIPINE BESYLATE 5 MG/1
2.5 TABLET ORAL DAILY
Status: DISCONTINUED | OUTPATIENT
Start: 2025-08-28 | End: 2025-08-29 | Stop reason: HOSPADM

## 2025-08-28 RX ORDER — HYDROMORPHONE HYDROCHLORIDE 1 MG/ML
0.5 INJECTION, SOLUTION INTRAMUSCULAR; INTRAVENOUS; SUBCUTANEOUS
Status: DISCONTINUED | OUTPATIENT
Start: 2025-08-28 | End: 2025-08-29 | Stop reason: HOSPADM

## 2025-08-28 RX ORDER — DOCUSATE SODIUM 100 MG/1
100 CAPSULE, LIQUID FILLED ORAL 2 TIMES DAILY PRN
Status: DISCONTINUED | OUTPATIENT
Start: 2025-08-28 | End: 2025-08-29 | Stop reason: HOSPADM

## 2025-08-28 RX ORDER — SODIUM CHLORIDE 0.9 % (FLUSH) 0.9 %
3 SYRINGE (ML) INJECTION AS NEEDED
Status: DISCONTINUED | OUTPATIENT
Start: 2025-08-28 | End: 2025-08-28 | Stop reason: HOSPADM

## 2025-08-28 RX ORDER — SODIUM CHLORIDE 0.9 % (FLUSH) 0.9 %
1-10 SYRINGE (ML) INJECTION AS NEEDED
Status: DISCONTINUED | OUTPATIENT
Start: 2025-08-28 | End: 2025-08-28 | Stop reason: HOSPADM

## 2025-08-28 RX ORDER — SODIUM CHLORIDE, SODIUM LACTATE, POTASSIUM CHLORIDE, CALCIUM CHLORIDE 600; 310; 30; 20 MG/100ML; MG/100ML; MG/100ML; MG/100ML
75 INJECTION, SOLUTION INTRAVENOUS CONTINUOUS
Status: DISCONTINUED | OUTPATIENT
Start: 2025-08-28 | End: 2025-08-29

## 2025-08-28 RX ORDER — MAGNESIUM HYDROXIDE 1200 MG/15ML
LIQUID ORAL AS NEEDED
Status: DISCONTINUED | OUTPATIENT
Start: 2025-08-28 | End: 2025-08-28 | Stop reason: HOSPADM

## 2025-08-28 RX ORDER — CARBOXYMETHYLCELLULOSE SODIUM 5 MG/ML
1 SOLUTION/ DROPS OPHTHALMIC 4 TIMES DAILY PRN
COMMUNITY

## 2025-08-28 RX ORDER — FLUMAZENIL 0.1 MG/ML
0.2 INJECTION INTRAVENOUS AS NEEDED
Status: DISCONTINUED | OUTPATIENT
Start: 2025-08-28 | End: 2025-08-28 | Stop reason: HOSPADM

## 2025-08-28 RX ORDER — ONDANSETRON 4 MG/1
4 TABLET, ORALLY DISINTEGRATING ORAL EVERY 6 HOURS PRN
Status: DISCONTINUED | OUTPATIENT
Start: 2025-08-28 | End: 2025-08-29 | Stop reason: HOSPADM

## 2025-08-28 RX ORDER — HYDROCODONE BITARTRATE AND ACETAMINOPHEN 5; 325 MG/1; MG/1
1 TABLET ORAL EVERY 4 HOURS PRN
Status: DISCONTINUED | OUTPATIENT
Start: 2025-08-28 | End: 2025-08-28 | Stop reason: HOSPADM

## 2025-08-28 RX ORDER — ROCURONIUM BROMIDE 10 MG/ML
INJECTION, SOLUTION INTRAVENOUS AS NEEDED
Status: DISCONTINUED | OUTPATIENT
Start: 2025-08-28 | End: 2025-08-28 | Stop reason: SURG

## 2025-08-28 RX ORDER — OXYBUTYNIN CHLORIDE 5 MG/1
5 TABLET, EXTENDED RELEASE ORAL DAILY
Status: DISCONTINUED | OUTPATIENT
Start: 2025-08-28 | End: 2025-08-29 | Stop reason: HOSPADM

## 2025-08-28 RX ORDER — SODIUM CHLORIDE 9 MG/ML
40 INJECTION, SOLUTION INTRAVENOUS AS NEEDED
Status: DISCONTINUED | OUTPATIENT
Start: 2025-08-28 | End: 2025-08-28 | Stop reason: HOSPADM

## 2025-08-28 RX ORDER — NALOXONE HCL 0.4 MG/ML
0.1 VIAL (ML) INJECTION
Status: DISCONTINUED | OUTPATIENT
Start: 2025-08-28 | End: 2025-08-29 | Stop reason: HOSPADM

## 2025-08-28 RX ORDER — FENTANYL CITRATE 50 UG/ML
INJECTION, SOLUTION INTRAMUSCULAR; INTRAVENOUS AS NEEDED
Status: DISCONTINUED | OUTPATIENT
Start: 2025-08-28 | End: 2025-08-28 | Stop reason: SURG

## 2025-08-28 RX ORDER — PANTOPRAZOLE SODIUM 40 MG/1
40 TABLET, DELAYED RELEASE ORAL
Status: DISCONTINUED | OUTPATIENT
Start: 2025-08-29 | End: 2025-08-29 | Stop reason: HOSPADM

## 2025-08-28 RX ORDER — HYDROCODONE BITARTRATE AND ACETAMINOPHEN 5; 325 MG/1; MG/1
1 TABLET ORAL EVERY 4 HOURS PRN
Status: DISCONTINUED | OUTPATIENT
Start: 2025-08-28 | End: 2025-08-29 | Stop reason: HOSPADM

## 2025-08-28 RX ORDER — NALOXONE HCL 0.4 MG/ML
0.4 VIAL (ML) INJECTION AS NEEDED
Status: DISCONTINUED | OUTPATIENT
Start: 2025-08-28 | End: 2025-08-28 | Stop reason: HOSPADM

## 2025-08-28 RX ORDER — ONDANSETRON 2 MG/ML
4 INJECTION INTRAMUSCULAR; INTRAVENOUS EVERY 6 HOURS PRN
Status: DISCONTINUED | OUTPATIENT
Start: 2025-08-28 | End: 2025-08-29 | Stop reason: HOSPADM

## 2025-08-28 RX ORDER — HYDROCODONE BITARTRATE AND ACETAMINOPHEN 10; 325 MG/1; MG/1
1 TABLET ORAL EVERY 4 HOURS PRN
Status: DISCONTINUED | OUTPATIENT
Start: 2025-08-28 | End: 2025-08-29 | Stop reason: HOSPADM

## 2025-08-28 RX ORDER — CARVEDILOL 6.25 MG/1
6.25 TABLET ORAL 2 TIMES DAILY WITH MEALS
Status: DISCONTINUED | OUTPATIENT
Start: 2025-08-28 | End: 2025-08-29 | Stop reason: HOSPADM

## 2025-08-28 RX ORDER — HYOSCYAMINE SULFATE 0.12 MG/1
250 TABLET SUBLINGUAL EVERY 4 HOURS PRN
Status: DISCONTINUED | OUTPATIENT
Start: 2025-08-28 | End: 2025-08-29 | Stop reason: HOSPADM

## 2025-08-28 RX ORDER — HYDROCODONE BITARTRATE AND ACETAMINOPHEN 10; 325 MG/1; MG/1
1 TABLET ORAL EVERY 4 HOURS PRN
Status: DISCONTINUED | OUTPATIENT
Start: 2025-08-28 | End: 2025-08-28 | Stop reason: HOSPADM

## 2025-08-28 RX ORDER — PROPOFOL 10 MG/ML
VIAL (ML) INTRAVENOUS AS NEEDED
Status: DISCONTINUED | OUTPATIENT
Start: 2025-08-28 | End: 2025-08-28 | Stop reason: SURG

## 2025-08-28 RX ORDER — ASPIRIN 81 MG/1
81 TABLET ORAL DAILY
Status: DISCONTINUED | OUTPATIENT
Start: 2025-08-28 | End: 2025-08-29 | Stop reason: HOSPADM

## 2025-08-28 RX ORDER — LATANOPROST 50 UG/ML
1 SOLUTION/ DROPS OPHTHALMIC NIGHTLY
COMMUNITY

## 2025-08-28 RX ORDER — FENTANYL CITRATE 50 UG/ML
50 INJECTION, SOLUTION INTRAMUSCULAR; INTRAVENOUS
Status: DISCONTINUED | OUTPATIENT
Start: 2025-08-28 | End: 2025-08-28 | Stop reason: HOSPADM

## 2025-08-28 RX ORDER — IBUPROFEN 600 MG/1
600 TABLET, FILM COATED ORAL EVERY 6 HOURS PRN
Status: DISCONTINUED | OUTPATIENT
Start: 2025-08-28 | End: 2025-08-28 | Stop reason: HOSPADM

## 2025-08-28 RX ORDER — ONDANSETRON 2 MG/ML
4 INJECTION INTRAMUSCULAR; INTRAVENOUS ONCE AS NEEDED
Status: DISCONTINUED | OUTPATIENT
Start: 2025-08-28 | End: 2025-08-28 | Stop reason: HOSPADM

## 2025-08-28 RX ORDER — LIDOCAINE HYDROCHLORIDE 20 MG/ML
INJECTION, SOLUTION EPIDURAL; INFILTRATION; INTRACAUDAL; PERINEURAL AS NEEDED
Status: DISCONTINUED | OUTPATIENT
Start: 2025-08-28 | End: 2025-08-28 | Stop reason: SURG

## 2025-08-28 RX ORDER — SODIUM CHLORIDE 0.9 % (FLUSH) 0.9 %
10 SYRINGE (ML) INJECTION AS NEEDED
Status: DISCONTINUED | OUTPATIENT
Start: 2025-08-28 | End: 2025-08-28 | Stop reason: HOSPADM

## 2025-08-28 RX ADMIN — CARVEDILOL 6.25 MG: 6.25 TABLET, FILM COATED ORAL at 17:27

## 2025-08-28 RX ADMIN — ONDANSETRON 4 MG: 2 INJECTION, SOLUTION INTRAMUSCULAR; INTRAVENOUS at 10:28

## 2025-08-28 RX ADMIN — PROPOFOL 150 MG: 10 INJECTION, EMULSION INTRAVENOUS at 09:50

## 2025-08-28 RX ADMIN — SODIUM CHLORIDE, POTASSIUM CHLORIDE, SODIUM LACTATE AND CALCIUM CHLORIDE 1000 ML: 600; 310; 30; 20 INJECTION, SOLUTION INTRAVENOUS at 07:21

## 2025-08-28 RX ADMIN — ROCURONIUM BROMIDE 5 MG: 10 INJECTION INTRAVENOUS at 09:50

## 2025-08-28 RX ADMIN — FENTANYL CITRATE 100 MCG: 50 INJECTION, SOLUTION INTRAMUSCULAR; INTRAVENOUS at 09:50

## 2025-08-28 RX ADMIN — CEFAZOLIN 2000 MG: 2 INJECTION, POWDER, FOR SOLUTION INTRAMUSCULAR; INTRAVENOUS at 17:27

## 2025-08-28 RX ADMIN — OXYBUTYNIN CHLORIDE 5 MG: 5 TABLET, EXTENDED RELEASE ORAL at 13:15

## 2025-08-28 RX ADMIN — DEXAMETHASONE SODIUM PHOSPHATE 4 MG: 4 INJECTION, SOLUTION INTRA-ARTICULAR; INTRALESIONAL; INTRAMUSCULAR; INTRAVENOUS; SOFT TISSUE at 10:28

## 2025-08-28 RX ADMIN — ASPIRIN 81 MG: 81 TABLET, DELAYED RELEASE ORAL at 12:16

## 2025-08-28 RX ADMIN — LIDOCAINE HYDROCHLORIDE 100 MG: 20 INJECTION, SOLUTION EPIDURAL; INFILTRATION; INTRACAUDAL; PERINEURAL at 09:50

## 2025-08-28 RX ADMIN — AMLODIPINE BESYLATE 2.5 MG: 5 TABLET ORAL at 12:15

## 2025-08-28 RX ADMIN — PROPOFOL 50 MG: 10 INJECTION, EMULSION INTRAVENOUS at 10:26

## 2025-08-28 RX ADMIN — SUGAMMADEX 200 MG: 100 INJECTION, SOLUTION INTRAVENOUS at 11:00

## 2025-08-28 RX ADMIN — CEFAZOLIN 2000 MG: 2 INJECTION, POWDER, FOR SOLUTION INTRAMUSCULAR; INTRAVENOUS at 09:47

## 2025-08-29 VITALS
DIASTOLIC BLOOD PRESSURE: 70 MMHG | BODY MASS INDEX: 29.19 KG/M2 | SYSTOLIC BLOOD PRESSURE: 146 MMHG | WEIGHT: 197.09 LBS | HEIGHT: 69 IN | HEART RATE: 72 BPM | RESPIRATION RATE: 18 BRPM | OXYGEN SATURATION: 100 % | TEMPERATURE: 97.5 F

## 2025-08-29 LAB
ANION GAP SERPL CALCULATED.3IONS-SCNC: 10 MMOL/L (ref 5–15)
BUN SERPL-MCNC: 23 MG/DL (ref 8–23)
BUN/CREAT SERPL: 21.3 (ref 7–25)
CALCIUM SPEC-SCNC: 8 MG/DL (ref 8.6–10.5)
CHLORIDE SERPL-SCNC: 109 MMOL/L (ref 98–107)
CO2 SERPL-SCNC: 23 MMOL/L (ref 22–29)
CREAT SERPL-MCNC: 1.08 MG/DL (ref 0.76–1.27)
DEPRECATED RDW RBC AUTO: 47 FL (ref 37–54)
EGFRCR SERPLBLD CKD-EPI 2021: 68.9 ML/MIN/1.73
ERYTHROCYTE [DISTWIDTH] IN BLOOD BY AUTOMATED COUNT: 13.2 % (ref 12.3–15.4)
GLUCOSE SERPL-MCNC: 86 MG/DL (ref 65–99)
HCT VFR BLD AUTO: 33.5 % (ref 37.5–51)
HGB BLD-MCNC: 10.7 G/DL (ref 13–17.7)
MCH RBC QN AUTO: 31 PG (ref 26.6–33)
MCHC RBC AUTO-ENTMCNC: 31.9 G/DL (ref 31.5–35.7)
MCV RBC AUTO: 97.1 FL (ref 79–97)
PLATELET # BLD AUTO: 151 10*3/MM3 (ref 140–450)
PMV BLD AUTO: 10.6 FL (ref 6–12)
POTASSIUM SERPL-SCNC: 3.8 MMOL/L (ref 3.5–5.2)
RBC # BLD AUTO: 3.45 10*6/MM3 (ref 4.14–5.8)
SODIUM SERPL-SCNC: 142 MMOL/L (ref 136–145)
WBC NRBC COR # BLD AUTO: 6.36 10*3/MM3 (ref 3.4–10.8)

## 2025-08-29 PROCEDURE — 25810000003 LACTATED RINGERS PER 1000 ML: Performed by: UROLOGY

## 2025-08-29 PROCEDURE — 80048 BASIC METABOLIC PNL TOTAL CA: CPT | Performed by: UROLOGY

## 2025-08-29 PROCEDURE — 25010000002 CEFAZOLIN PER 500 MG: Performed by: UROLOGY

## 2025-08-29 PROCEDURE — 85027 COMPLETE CBC AUTOMATED: CPT | Performed by: UROLOGY

## 2025-08-29 RX ORDER — HYOSCYAMINE SULFATE 100 MG/1
0.12 TABLET ORAL EVERY 4 HOURS PRN
Qty: 21 EACH | Refills: 1 | Status: SHIPPED | OUTPATIENT
Start: 2025-08-29

## 2025-08-29 RX ORDER — CEPHALEXIN 500 MG/1
500 CAPSULE ORAL 3 TIMES DAILY
Qty: 21 CAPSULE | Refills: 0 | Status: SHIPPED | OUTPATIENT
Start: 2025-08-29 | End: 2025-09-05

## 2025-08-29 RX ADMIN — ASPIRIN 81 MG: 81 TABLET, DELAYED RELEASE ORAL at 08:23

## 2025-08-29 RX ADMIN — OXYBUTYNIN CHLORIDE 5 MG: 5 TABLET, EXTENDED RELEASE ORAL at 08:23

## 2025-08-29 RX ADMIN — SODIUM CHLORIDE, POTASSIUM CHLORIDE, SODIUM LACTATE AND CALCIUM CHLORIDE 75 ML/HR: 600; 310; 30; 20 INJECTION, SOLUTION INTRAVENOUS at 00:37

## 2025-08-29 RX ADMIN — CARVEDILOL 6.25 MG: 6.25 TABLET, FILM COATED ORAL at 08:23

## 2025-08-29 RX ADMIN — AMLODIPINE BESYLATE 2.5 MG: 5 TABLET ORAL at 08:23

## 2025-08-29 RX ADMIN — CEFAZOLIN 2000 MG: 2 INJECTION, POWDER, FOR SOLUTION INTRAMUSCULAR; INTRAVENOUS at 02:48

## 2025-08-30 ENCOUNTER — HOSPITAL ENCOUNTER (EMERGENCY)
Facility: HOSPITAL | Age: 80
Discharge: HOME | End: 2025-08-30
Attending: STUDENT IN AN ORGANIZED HEALTH CARE EDUCATION/TRAINING PROGRAM
Payer: MEDICARE

## 2025-08-30 VITALS
DIASTOLIC BLOOD PRESSURE: 75 MMHG | HEIGHT: 69 IN | SYSTOLIC BLOOD PRESSURE: 143 MMHG | TEMPERATURE: 97.8 F | HEART RATE: 78 BPM | WEIGHT: 202 LBS | BODY MASS INDEX: 29.92 KG/M2 | RESPIRATION RATE: 16 BRPM | OXYGEN SATURATION: 99 %

## 2025-08-30 DIAGNOSIS — T83.9XXA PROBLEM WITH FOLEY CATHETER, INITIAL ENCOUNTER: Primary | ICD-10-CM

## 2025-08-30 PROCEDURE — 99283 EMERGENCY DEPT VISIT LOW MDM: CPT | Performed by: STUDENT IN AN ORGANIZED HEALTH CARE EDUCATION/TRAINING PROGRAM

## 2025-08-30 RX ORDER — OXYCODONE AND ACETAMINOPHEN 5; 325 MG/1; MG/1
1 TABLET ORAL ONCE
Refills: 0 | Status: COMPLETED | OUTPATIENT
Start: 2025-08-30 | End: 2025-08-30

## 2025-08-30 RX ADMIN — OXYCODONE HYDROCHLORIDE AND ACETAMINOPHEN 1 TABLET: 5; 325 TABLET ORAL at 20:07

## 2025-09-03 ENCOUNTER — HOSPITAL ENCOUNTER (OUTPATIENT)
Age: 80
Discharge: HOME | End: 2025-09-05
Attending: INTERNAL MEDICINE
Payer: MEDICARE

## 2025-09-03 DIAGNOSIS — R06.02 SHORTNESS OF BREATH: ICD-10-CM

## 2025-09-03 LAB
ECHO AO ASC DIAM: 2.7 CM
ECHO AO ROOT DIAM: 1.7 CM
ECHO AO SINUS VALSALVA DIAM: 3.2 CM
ECHO AO ST JNCT DIAM: 1.7 CM
ECHO AV AREA PEAK VELOCITY: 2.2 CM2
ECHO AV AREA VTI: 2.3 CM2
ECHO AV MEAN GRADIENT: 3 MMHG
ECHO AV MEAN VELOCITY: 0.8 M/S
ECHO AV PEAK GRADIENT: 6 MMHG
ECHO AV PEAK VELOCITY: 1.2 M/S
ECHO AV VELOCITY RATIO: 0.75
ECHO AV VTI: 27 CM
ECHO EST RA PRESSURE: 3 MMHG
ECHO IVC PROX: 1.9 CM
ECHO LA AREA 2C: 20.1 CM2
ECHO LA AREA 4C: 19.2 CM2
ECHO LA DIAMETER: 4 CM
ECHO LA MAJOR AXIS: 5.8 CM
ECHO LA MINOR AXIS: 5.7 CM
ECHO LA TO AORTIC ROOT RATIO: 2.35
ECHO LA VOL BP: 54 ML (ref 18–58)
ECHO LA VOL MOD A2C: 56 ML (ref 18–58)
ECHO LA VOL MOD A4C: 50 ML (ref 18–58)
ECHO LV E' LATERAL VELOCITY: 6.74 CM/S
ECHO LV E' SEPTAL VELOCITY: 5.98 CM/S
ECHO LV EDV A2C: 159 ML
ECHO LV EDV A4C: 167 ML
ECHO LV EF PHYSICIAN: 50 %
ECHO LV EJECTION FRACTION A2C: 47 %
ECHO LV EJECTION FRACTION A4C: 53 %
ECHO LV EJECTION FRACTION BIPLANE: 51 % (ref 55–100)
ECHO LV ESV A2C: 84 ML
ECHO LV ESV A4C: 79 ML
ECHO LV FRACTIONAL SHORTENING: 25 % (ref 28–44)
ECHO LV INTERNAL DIMENSION DIASTOLIC: 5.9 CM (ref 4.2–5.9)
ECHO LV INTERNAL DIMENSION SYSTOLIC: 4.4 CM
ECHO LV IVSD: 1.3 CM (ref 0.6–1)
ECHO LV MASS 2D: 271.9 G (ref 88–224)
ECHO LV POSTERIOR WALL DIASTOLIC: 0.9 CM (ref 0.6–1)
ECHO LV RELATIVE WALL THICKNESS RATIO: 0.31
ECHO LVOT AREA: 3.1 CM2
ECHO LVOT AV VTI INDEX: 0.74
ECHO LVOT DIAM: 2 CM
ECHO LVOT MEAN GRADIENT: 2 MMHG
ECHO LVOT PEAK GRADIENT: 3 MMHG
ECHO LVOT PEAK GRADIENT: 3 MMHG
ECHO LVOT PEAK VELOCITY: 0.9 M/S
ECHO LVOT SV: 62.5 ML
ECHO LVOT VTI: 19.9 CM
ECHO MV A VELOCITY: 1.08 M/S
ECHO MV AREA VTI: 1.9 CM2
ECHO MV E DECELERATION TIME (DT): 222 MS
ECHO MV E VELOCITY: 0.67 M/S
ECHO MV E/A RATIO: 0.62
ECHO MV E/E' LATERAL: 9.94
ECHO MV E/E' RATIO (AVERAGED): 10.57
ECHO MV E/E' SEPTAL: 11.2
ECHO MV LVOT VTI INDEX: 1.63
ECHO MV MAX VELOCITY: 1.1 M/S
ECHO MV MEAN GRADIENT: 2 MMHG
ECHO MV MEAN VELOCITY: 0.8 M/S
ECHO MV PEAK GRADIENT: 5 MMHG
ECHO MV REGURGITANT VTIA: 195 CM
ECHO MV VTI: 32.4 CM
ECHO RA AREA 4C: 13.4 CM2
ECHO RA VOLUME: 29 ML
ECHO RIGHT VENTRICULAR SYSTOLIC PRESSURE (RVSP): 21 MMHG
ECHO RV BASAL DIMENSION: 4.3 CM
ECHO RV INTERNAL DIMENSION: 3.1 CM
ECHO RV LONGITUDINAL DIMENSION: 6.2 CM
ECHO RV MID DIMENSION: 3.1 CM
ECHO RV TAPSE: 1.6 CM (ref 1.7–?)
ECHO TV REGURGITANT MAX VELOCITY: 2.11 M/S
ECHO TV REGURGITANT PEAK GRADIENT: 18 MMHG

## 2025-09-03 PROCEDURE — 93306 TTE W/DOPPLER COMPLETE: CPT | Performed by: INTERNAL MEDICINE

## 2025-09-03 PROCEDURE — C8929 TTE W OR WO FOL WCON,DOPPLER: HCPCS

## 2025-09-03 PROCEDURE — 6360000004 HC RX CONTRAST MEDICATION: Performed by: INTERNAL MEDICINE

## 2025-09-03 RX ADMIN — SULFUR HEXAFLUORIDE 2 ML: 60.7; .19; .19 INJECTION, POWDER, LYOPHILIZED, FOR SUSPENSION INTRAVENOUS; INTRAVESICAL at 08:33

## (undated) DEVICE — HLDR PROB URONAV

## (undated) DEVICE — SURGICAL PROCEDURE PACK LOWER EXTREMITY LOURDES HOSP

## (undated) DEVICE — CABL BIPOL MEGADYNE 12FT DISP

## (undated) DEVICE — THE SINGLE USE ETRAP – POLYP TRAP IS USED FOR SUCTION RETRIEVAL OF ENDOSCOPICALLY REMOVED POLYPS.: Brand: ETRAP

## (undated) DEVICE — GLOVE SURG SZ 75 L12IN FNGR THK13MIL BRN LTX SYN POLYMER W

## (undated) DEVICE — SENSR O2 OXIMAX FNGR A/ 18IN NONSTR

## (undated) DEVICE — POSITIONER,HEAD,MULTIRING,36CS: Brand: MEDLINE

## (undated) DEVICE — TBG SMPL FLTR LINE NASL 02/C02 A/ BX/100

## (undated) DEVICE — PK CYSTO 30

## (undated) DEVICE — YANKAUER,BULB TIP WITH VENT: Brand: ARGYLE

## (undated) DEVICE — BIT DRL L110MM DIA1.8MM QUIK CPL CALIB W/O STP REUSE

## (undated) DEVICE — GAUZE,SPONGE,4"X4",12PLY,STRL,LF,10/TRAY: Brand: MEDLINE

## (undated) DEVICE — MASK,OXYGEN,MED CONC,ADLT,7' TUB, UC: Brand: PENDING

## (undated) DEVICE — CONMED SCOPE SAVER BITE BLOCK, 20X27 MM: Brand: SCOPE SAVER

## (undated) DEVICE — CHLORAPREP 26ML ORANGE

## (undated) DEVICE — GLV SURG BIOGEL M LTX PF 7 1/2

## (undated) DEVICE — MAJOR BSIN SETUP PK

## (undated) DEVICE — STERILE WIRE CUTTER (WCS144): Brand: CENTURION

## (undated) DEVICE — CUFF,BP,DISP,1 TUBE,ADULT,HP: Brand: MEDLINE

## (undated) DEVICE — FRCP BX RADJAW4 NDL 2.8 240 STD OG

## (undated) DEVICE — 4-PORT MANIFOLD: Brand: NEPTUNE 2

## (undated) DEVICE — Device: Brand: DEFENDO AIR/WATER/SUCTION AND BIOPSY VALVE

## (undated) DEVICE — EVAC BLDR UROVAC W ADAPT

## (undated) DEVICE — THE CHANNEL CLEANING BRUSH IS A NYLON FLEXI BRUSH ATTACHED TO A FLEXIBLE PLASTIC SHEATH DESIGNED TO SAFELY REMOVE DEBRIS FROM FLEXIBLE ENDOSCOPES.

## (undated) DEVICE — 24/26FR BIPOLAR CUTTING LOOP: Brand: N.A.

## (undated) DEVICE — U-DRAPE: Brand: CONVERTORS

## (undated) DEVICE — VELCLOSE LATEX FREE VELCRO ELASTIC BANDAGE 4INX5YD: Brand: VELCLOSE

## (undated) DEVICE — AIRWAY CIRCUIT: Brand: DEROYAL

## (undated) DEVICE — SYRINGE,PISTON,IRRIGATION,60ML,STERILE: Brand: MEDLINE

## (undated) DEVICE — BANDAGE ESMARK 4IN ST

## (undated) DEVICE — GLOVE SURG SZ 8 CRM LTX FREE POLYISOPRENE POLYMER BEAD ANTI

## (undated) DEVICE — SUT ETHLN 6/0 P1 18IN 697G

## (undated) DEVICE — MASK LG ADLT ANES MEDICHOICE

## (undated) DEVICE — SOLUTION IV IRRIG POUR BRL 0.9% SODIUM CHL 2F7124

## (undated) DEVICE — PK ENT HD AND NK 30

## (undated) DEVICE — BAG,DRAINAGE,4L,A/R TOWER,LL,SLIDE TAP: Brand: MEDLINE

## (undated) DEVICE — PAD,PREPPING,CUFFED,24X48,7",NONSTERILE: Brand: MEDLINE

## (undated) DEVICE — TOWEL,OR,DSP,ST,BLUE,STD,4/PK,20PK/CS: Brand: MEDLINE

## (undated) DEVICE — Device

## (undated) DEVICE — SUT MNCRYL 4/0 P3 18IN UD MCP494G

## (undated) DEVICE — SPNG GZ WOVN 4X4IN 12PLY 10/BX STRL

## (undated) DEVICE — SNAR POLYP SENSATION MICRO OVL 13 240X40

## (undated) DEVICE — C-ARM: Brand: UNBRANDED

## (undated) DEVICE — ELECTRD BLD EZ CLN MOD XLNG 2.75IN

## (undated) DEVICE — CURITY NON-ADHERENT STRIPS: Brand: CURITY

## (undated) DEVICE — ENDOGATOR AUXILIARY WATER JET CONNECTOR: Brand: ENDOGATOR

## (undated) DEVICE — Device: Brand: IQ SYSTEM

## (undated) DEVICE — CONMED GOLDLINE ELECTROSURGICAL HANDPIECE, HAND CONTROLLED WITH BLADE ELECTRODE, BUTTON SWITCH, SAFETY HOLSTER AND 10 FT (3 M) CABLE: Brand: CONMED GOLDLINE

## (undated) DEVICE — Z DISCONTINUED USE 2526318 PAD ES AD DISPER GRND SLD W CRD 1100 SER

## (undated) DEVICE — MEDI-VAC YANK SUCT HNDL W/TPRD BULBOUS TIP: Brand: CARDINAL HEALTH

## (undated) DEVICE — WIRE FIX L150MM DIA1.6MM THRD L20MM TI PARTIALLY THRDED FOR

## (undated) DEVICE — SUTURE PROL SZ 1 L30IN NONABSORBABLE BLU CTX L48MM 1/2 CIR 8455H

## (undated) DEVICE — DISCONTINUED USE 416956 GLOVE 8.5 LTX ST TRIFLEX POWDER LK CF

## (undated) DEVICE — MAX-CORE® DISPOSABLE CORE BIOPSY INSTRUMENT, 18G X 25CM: Brand: MAX-CORE

## (undated) DEVICE — TRAP FLD MINIVAC MEGADYNE 100ML

## (undated) DEVICE — SUT PROLN 6/0 P1 18IN 8697G

## (undated) DEVICE — BAPTIST TURNOVER KIT: Brand: MEDLINE INDUSTRIES, INC.

## (undated) DEVICE — DOVER HYDROGEL COATED LATEX FOLEY CATHETER, 30 ML, 3-WAY 24 FR/CH (8.0 MM): Brand: DOVER

## (undated) DEVICE — SINGLE-USE POLYPECTOMY SNARE: Brand: CAPTIVATOR II

## (undated) DEVICE — AMBU AURAONCE U SIZE 4: Brand: AURAONCE

## (undated) DEVICE — DISCONTINUED NO SUB IDED TG GLOVE SURG SENSICARE ALOE LT LF PF ST GRN SZ 8

## (undated) DEVICE — BANDAGE GZ W45INXL4 1 10YD FLUF RL 6 PLY DERMACEA

## (undated) DEVICE — ST FLD IRR WARM

## (undated) DEVICE — MARKER,SKIN,WI/RULER AND LABELS: Brand: MEDLINE